# Patient Record
Sex: FEMALE | Race: WHITE | Employment: OTHER | URBAN - METROPOLITAN AREA
[De-identification: names, ages, dates, MRNs, and addresses within clinical notes are randomized per-mention and may not be internally consistent; named-entity substitution may affect disease eponyms.]

---

## 2017-10-30 ENCOUNTER — HOSPITAL ENCOUNTER (EMERGENCY)
Age: 42
Discharge: HOME | End: 2017-10-30
Attending: SPECIALIST
Payer: MEDICARE

## 2017-10-30 ENCOUNTER — APPOINTMENT (OUTPATIENT)
Dept: GENERAL RADIOLOGY | Age: 42
End: 2017-10-30
Payer: MEDICARE

## 2017-10-30 VITALS
BODY MASS INDEX: 39.56 KG/M2 | WEIGHT: 215 LBS | RESPIRATION RATE: 14 BRPM | HEART RATE: 64 BPM | HEIGHT: 62 IN | SYSTOLIC BLOOD PRESSURE: 122 MMHG | DIASTOLIC BLOOD PRESSURE: 72 MMHG | OXYGEN SATURATION: 97 % | TEMPERATURE: 97.7 F

## 2017-10-30 DIAGNOSIS — R11.0 NAUSEA: ICD-10-CM

## 2017-10-30 DIAGNOSIS — R51.9 NONINTRACTABLE HEADACHE, UNSPECIFIED CHRONICITY PATTERN, UNSPECIFIED HEADACHE TYPE: Primary | ICD-10-CM

## 2017-10-30 PROCEDURE — 71020 XR CHEST STANDARD TWO VW: CPT

## 2017-10-30 PROCEDURE — 99283 EMERGENCY DEPT VISIT LOW MDM: CPT

## 2017-10-30 PROCEDURE — 96375 TX/PRO/DX INJ NEW DRUG ADDON: CPT

## 2017-10-30 PROCEDURE — 6360000002 HC RX W HCPCS: Performed by: PHYSICIAN ASSISTANT

## 2017-10-30 PROCEDURE — 96374 THER/PROPH/DIAG INJ IV PUSH: CPT

## 2017-10-30 PROCEDURE — 2580000003 HC RX 258: Performed by: PHYSICIAN ASSISTANT

## 2017-10-30 RX ORDER — DICLOFENAC POTASSIUM 50 MG/1
50 TABLET, FILM COATED ORAL 2 TIMES DAILY
COMMUNITY
End: 2017-12-14

## 2017-10-30 RX ORDER — TIZANIDINE 4 MG/1
4 TABLET ORAL EVERY 6 HOURS PRN
COMMUNITY
End: 2018-11-28 | Stop reason: ALTCHOICE

## 2017-10-30 RX ORDER — DEXAMETHASONE SODIUM PHOSPHATE 10 MG/ML
10 INJECTION INTRAMUSCULAR; INTRAVENOUS ONCE
Status: COMPLETED | OUTPATIENT
Start: 2017-10-30 | End: 2017-10-30

## 2017-10-30 RX ORDER — KETOROLAC TROMETHAMINE 15 MG/ML
15 INJECTION, SOLUTION INTRAMUSCULAR; INTRAVENOUS ONCE
Status: COMPLETED | OUTPATIENT
Start: 2017-10-30 | End: 2017-10-30

## 2017-10-30 RX ORDER — GABAPENTIN 300 MG/1
300 CAPSULE ORAL 3 TIMES DAILY
COMMUNITY
End: 2018-04-26 | Stop reason: ALTCHOICE

## 2017-10-30 RX ORDER — DIPHENHYDRAMINE HYDROCHLORIDE 50 MG/ML
25 INJECTION INTRAMUSCULAR; INTRAVENOUS ONCE
Status: COMPLETED | OUTPATIENT
Start: 2017-10-30 | End: 2017-10-30

## 2017-10-30 RX ORDER — LITHIUM CARBONATE 300 MG
300 TABLET ORAL 3 TIMES DAILY
COMMUNITY
End: 2017-12-14

## 2017-10-30 RX ORDER — BUTALBITAL, ACETAMINOPHEN AND CAFFEINE 50; 325; 40 MG/1; MG/1; MG/1
1 TABLET ORAL EVERY 4 HOURS PRN
COMMUNITY
End: 2017-12-14

## 2017-10-30 RX ORDER — PRIMIDONE 50 MG/1
50 TABLET ORAL 3 TIMES DAILY
COMMUNITY
End: 2017-12-14

## 2017-10-30 RX ORDER — VERAPAMIL HYDROCHLORIDE 240 MG/1
240 CAPSULE, EXTENDED RELEASE ORAL NIGHTLY
COMMUNITY
End: 2017-12-14

## 2017-10-30 RX ORDER — PROMETHAZINE HYDROCHLORIDE 25 MG/1
25 TABLET ORAL 2 TIMES DAILY PRN
COMMUNITY
End: 2018-03-22 | Stop reason: SDUPTHER

## 2017-10-30 RX ORDER — 0.9 % SODIUM CHLORIDE 0.9 %
500 INTRAVENOUS SOLUTION INTRAVENOUS ONCE
Status: COMPLETED | OUTPATIENT
Start: 2017-10-30 | End: 2017-10-30

## 2017-10-30 RX ORDER — PROMETHAZINE HYDROCHLORIDE 25 MG/ML
12.5 INJECTION, SOLUTION INTRAMUSCULAR; INTRAVENOUS ONCE
Status: COMPLETED | OUTPATIENT
Start: 2017-10-30 | End: 2017-10-30

## 2017-10-30 RX ORDER — DULOXETIN HYDROCHLORIDE 60 MG/1
90 CAPSULE, DELAYED RELEASE ORAL DAILY
COMMUNITY
End: 2017-12-14

## 2017-10-30 RX ORDER — DOCUSATE SODIUM 100 MG/1
100 CAPSULE, LIQUID FILLED ORAL 2 TIMES DAILY
COMMUNITY
End: 2017-12-14

## 2017-10-30 RX ORDER — METOCLOPRAMIDE 10 MG/1
10 TABLET ORAL 4 TIMES DAILY
Qty: 20 TABLET | Refills: 0 | Status: SHIPPED | OUTPATIENT
Start: 2017-10-30 | End: 2017-12-14

## 2017-10-30 RX ORDER — DIHYDROERGOTAMINE MESYLATE 1 MG/ML
1 INJECTION, SOLUTION INTRAMUSCULAR; INTRAVENOUS; SUBCUTANEOUS ONCE
COMMUNITY
End: 2017-12-14

## 2017-10-30 RX ADMIN — DIPHENHYDRAMINE HYDROCHLORIDE 25 MG: 50 INJECTION, SOLUTION INTRAMUSCULAR; INTRAVENOUS at 13:52

## 2017-10-30 RX ADMIN — SODIUM CHLORIDE 500 ML: 9 INJECTION, SOLUTION INTRAVENOUS at 13:51

## 2017-10-30 RX ADMIN — PROMETHAZINE HYDROCHLORIDE 12.5 MG: 25 INJECTION INTRAMUSCULAR; INTRAVENOUS at 13:52

## 2017-10-30 RX ADMIN — KETOROLAC TROMETHAMINE 15 MG: 15 INJECTION, SOLUTION INTRAMUSCULAR; INTRAVENOUS at 14:03

## 2017-10-30 RX ADMIN — DEXAMETHASONE SODIUM PHOSPHATE 10 MG: 10 INJECTION INTRAMUSCULAR; INTRAVENOUS at 13:52

## 2017-10-30 ASSESSMENT — PAIN DESCRIPTION - LOCATION
LOCATION: HEAD
LOCATION: HEAD

## 2017-10-30 ASSESSMENT — PAIN SCALES - GENERAL
PAINLEVEL_OUTOF10: 9
PAINLEVEL_OUTOF10: 9
PAINLEVEL_OUTOF10: 7

## 2017-10-30 NOTE — ED PROVIDER NOTES
[facility] ED  [address] [address]  Phone: [phone]  Fax: [phone]      eMERGENCY dEPARTMENT eNCOUnter      Pt Name: Pamela Delatorre  MRN: [id number]  [place] 1975  Date of evaluation: 10/30/17      CHIEF COMPLAINT:   Chief Complaint   Patient presents with    Migraine     since last monday, was seen at [facility] ED last Monday, saw PCP and received phenergan, benadryl, toradol Friday without relief. pt alert et oriented x 4, c/o nausea as well    Nausea     HISTORY OF PRESENT ILLNESS    Alhaji Chang is a 38 y.o. female who presents with a CEPHALGIA complaint:     \"Worst headache of your life? \"     NO  Typical in location/character to previous headaches? YES  Gradually worsening? NO   Nausea? YES    Vomiting? YES    Photophobia? YES  Vision changes? NO  Significant URI/sinusitis? NO  Neck pain? NO  Fevers? NO  Timeframe/context?    7 days  Modifying factors? Pt reports persistent right frontal HA since fall on 10/23/17. She reports being evaluated at [facility] with neg CT Head and discharged home. She was seen at [facility] ED 2 days later for HA and treated/discharged. This HA is consistent with her chronic HA in location/character just more severe. She reports persistent HA and nausea with intermittent vomiting. Vomiting x 6 since yesterday with decreased oral rehydration. She denies any recent viral/URI/ENT/cough/myalgia symptoms. She takes Phenergan and HA meds at home but not helping. No new neck pain/chest pain/abd pain/f/c. She reports intermittent SOB prior to this x 2 weeks. No resp hx, denies smoking or need for MDI. She has Neurologist that she sees but hasn't contacted them, though she reports appt with him this Friday. She also sees Pain Mgmt for her chronic neck pain only. Nursing Notes were reviewed. REVIEW OF SYSTEMS     Constitutional: per HPI  Eyes: per HPI   Neck: No neck pain.    Respiratory: Denies recent shortness of breath. Cardiac:  Denies recent chest pain. GI:  Per HPI  : Denies dysuria. Musculoskeletal: per HPI  Neurologic:  Per HPI  Skin:  Denies any rash. Negative in 10 essential Systems except as mentioned above and in the HPI. PAST MEDICAL HISTORY   PMH:  has a past medical history of Anxiety; Bipolar 1 disorder ([address] CMT (Charcot-Melissa-Tooth disease); Depression; and Psychiatric problem. Surgical History:  has a past surgical history that includes Bladder surgery; Neck surgery (8/2012); knee surgery (Bilateral); Hysterectomy; Cholecystectomy; and Knee arthroscopy. Social History:  reports that she has never smoked. She has never used smokeless tobacco. She reports that she does not drink alcohol or use drugs. Family History: Noncontributory   Psychiatric History: Noncontributory     Allergies:is allergic to latex; azithromycin; buspar [buspirone]; compazine [prochlorperazine maleate]; pcn [penicillins]; and zofran [ondansetron hcl]. PHYSICAL EXAM     INITIAL VITALS: /72   Pulse 64   Temp 97.7 °F (36.5 °C) (Oral)   Resp 14   Ht 5' 2\" (1.575 m)   Wt 97.5 kg (215 lb)   SpO2 97%   BMI 39.32 kg/m²     Constitutional:  Well developed, A&O x 3  Eyes:  Pupils equal and readily reactive to light at 4 mm bilaterally. EOMI. Very mild photophobia only. HENT:  Atraumatic,no TTP. External ears normal, bilat TM wnl.  nose normal, oropharynx decreased hydration. No tenderness to palpation of the temporal arteries. Neck:  No midline pain. Full range of motion. Supple with no meningismus. Respiratory:  Clear to auscultation bilaterally with good air exchange, no W/R/R. Comfortable speech and breathing. Cardiovascular:  RRR with normal S1 and S2  GI:  Soft, nondistended/normal BS, and nontender   Musculoskeletal:  No edema, no tenderness, no deformities  Neuro examination:   No deficits on exam.  Functional ROM BUE/BLE.          EMERGENCY DEPARTMENT COURSE: Orders Placed This Encounter   Medications    0.9 % sodium chloride bolus    promethazine (PHENERGAN) injection 12.5 mg    dexamethasone (DECADRON) injection 10 mg    ketorolac (TORADOL) injection 15 mg    diphenhydrAMINE (BENADRYL) injection 25 mg    metoclopramide (REGLAN) 10 MG tablet     Sig: Take 1 tablet by mouth 4 times daily WARNING:  May cause drowsiness. May impair ability to operate vehicles or machinery. Do not use in combination with alcohol. Dispense:  20 tablet     Refill:  0     1412  AVSS, nontoxic/appropriate patient. IVF/Toradol/Decadron/Phenergan. Controlled Substances Monitoring:     Attestation: The Prescription Monitoring Report for this patient was reviewed today. (Jay Cotton PA-C)  3 Norco rx in the last 30 days. 1459 June 2017 MRI and July 2017 CT Head all negative via [facility]. [phone]  Attending discharged this patient after discussing all labwork/imaging results that were finalized. Treatment plan and recommended follow-up discussed with them as well. I will provide Reglan for alternative anti-emetic as needed. DIAGNOSTIC RESULTS     EKG: All EKG's are interpreted by the Emergency Department Physician who either signs or Co-signs this chart in the absence of a cardiologist.  Not indicated    RADIOLOGY:   Non-plain film images such as CT, Ultrasound and MRI are read by the radiologist. Plain radiographic images are visualized and preliminarily interpreted by the emergency physician with the below findings:    Not indicated    Interpretation per the Radiologist below, if available at the time of this note:    XR CHEST STANDARD (2 VW)   Final Result   No acute cardiopulmonary process identified. LABS:  Labs Reviewed - No data to display  Not indicated    All other labs were within normal range or not returned as of this dictation. FINAL IMPRESSION:     1. Nonintractable headache, unspecified chronicity pattern, unspecified headache type    2.

## 2017-10-31 NOTE — ED PROVIDER NOTES
Emergency Department     Faculty Attestation    I performed a history and physical examination of the patient and discussed management with the mid level provider. I reviewed the mid level provider's note and agree with the documented findings and plan of care. Any areas of disagreement are noted on the chart. I was personally present for the key portions of any procedures. I have documented in the chart those procedures where I was not present during the key portions. I have reviewed the emergency nurses triage note. I agree with the chief complaint, past medical history, past surgical history, allergies, medications, social and family history as documented unless otherwise noted below. Documentation of the HPI, Physical Exam and Medical Decision Making performed by medical students or scribes is based on my personal performance of the HPI, PE and MDM. For Physician Assistant/ Nurse Practitioner cases/documentation I have personally evaluated this patient and have completed at least one if not all key elements of the E/M (history, physical exam, and MDM). Additional findings are as noted. Primary Care Physician:  Brain High DO    CHIEF COMPLAINT       Chief Complaint   Patient presents with    Migraine     since last monday, was seen at [facility] [place] [facility] ED last Monday, saw PCP and received phenergan, benadryl, toradol Friday without relief. pt alert et oriented x 4, c/o nausea as well    Nausea       RECENT VITALS:   Temp: 97.7 °F (36.5 °C),  Pulse: 64, Resp: 14, BP: 122/72    LABS:  Labs Reviewed - No data to display      PERTINENT ATTENDING PHYSICIAN COMMENTS:      Patient has chronic HA's and this one is a typical HA for the patient. Gradual in onset and not \"the worst HA of life\". Denies fevers/chills/IVDA. Physical exam:  CTA Bi, RRR, abd soft/nt/nd/+ BS.    Neuro: CN II-XII intact, 5/5 strength bilaterally, sensation to light touch intact bilaterally, Down going babinski's bilaterally, DTR's are 2+ bilaterally and equal.  Absolutely no meningismus and is nontoxic in appearance. No tenderness to palpation of the temporal arteries, with strong temporal artery pulses palpated. PERRL bilaterally. Plan:  Pain control and reeval after.           Arpita Torres MD  10/31/17 2573

## 2017-11-03 ENCOUNTER — HOSPITAL ENCOUNTER (EMERGENCY)
Age: 42
Discharge: HOME | End: 2017-11-03
Attending: EMERGENCY MEDICINE
Payer: MEDICARE

## 2017-11-03 ENCOUNTER — APPOINTMENT (OUTPATIENT)
Dept: CT IMAGING | Age: 42
End: 2017-11-03
Payer: MEDICARE

## 2017-11-03 VITALS
RESPIRATION RATE: 16 BRPM | HEIGHT: 62 IN | OXYGEN SATURATION: 97 % | WEIGHT: 215 LBS | DIASTOLIC BLOOD PRESSURE: 84 MMHG | TEMPERATURE: 98.2 F | BODY MASS INDEX: 39.56 KG/M2 | HEART RATE: 73 BPM | SYSTOLIC BLOOD PRESSURE: 139 MMHG

## 2017-11-03 DIAGNOSIS — K85.00 IDIOPATHIC ACUTE PANCREATITIS WITHOUT INFECTION OR NECROSIS: Primary | ICD-10-CM

## 2017-11-03 DIAGNOSIS — R51.9 HEADACHE DISORDER: ICD-10-CM

## 2017-11-03 LAB
ABSOLUTE EOS #: 0.8 K/UL (ref 0–0.4)
ABSOLUTE IMMATURE GRANULOCYTE: ABNORMAL K/UL (ref 0–0.3)
ABSOLUTE LYMPH #: 1.4 K/UL (ref 1–4.8)
ABSOLUTE MONO #: 0.7 K/UL (ref 0.1–1.2)
AMYLASE: 124 U/L (ref 28–100)
ANION GAP SERPL CALCULATED.3IONS-SCNC: 14 MMOL/L (ref 9–17)
BASOPHILS # BLD: 1 %
BASOPHILS ABSOLUTE: 0.1 K/UL (ref 0–0.2)
BILIRUBIN URINE: NEGATIVE
BUN BLDV-MCNC: 8 MG/DL (ref 6–20)
BUN/CREAT BLD: NORMAL (ref 9–20)
CALCIUM SERPL-MCNC: 8.7 MG/DL (ref 8.6–10.4)
CHLORIDE BLD-SCNC: 104 MMOL/L (ref 98–107)
CO2: 23 MMOL/L (ref 20–31)
COLOR: YELLOW
COMMENT UA: NORMAL
CREAT SERPL-MCNC: 0.59 MG/DL (ref 0.5–0.9)
DIFFERENTIAL TYPE: ABNORMAL
EOSINOPHILS RELATIVE PERCENT: 7 %
GFR AFRICAN AMERICAN: >60 ML/MIN
GFR NON-AFRICAN AMERICAN: >60 ML/MIN
GFR SERPL CREATININE-BSD FRML MDRD: NORMAL ML/MIN/{1.73_M2}
GFR SERPL CREATININE-BSD FRML MDRD: NORMAL ML/MIN/{1.73_M2}
GLUCOSE BLD-MCNC: 96 MG/DL (ref 70–99)
GLUCOSE URINE: NEGATIVE
HCT VFR BLD CALC: 36.4 % (ref 36–46)
HEMOGLOBIN: 12.1 G/DL (ref 12–16)
IMMATURE GRANULOCYTES: ABNORMAL %
KETONES, URINE: NEGATIVE
LEUKOCYTE ESTERASE, URINE: NEGATIVE
LIPASE: 65 U/L (ref 13–60)
LYMPHOCYTES # BLD: 13 %
MCH RBC QN AUTO: 29.8 PG (ref 26–34)
MCHC RBC AUTO-ENTMCNC: 33.3 G/DL (ref 31–37)
MCV RBC AUTO: 89.5 FL (ref 80–100)
MONOCYTES # BLD: 7 %
NITRITE, URINE: NEGATIVE
PDW BLD-RTO: 14 % (ref 12.5–15.4)
PH UA: 7.5 (ref 5–8)
PLATELET # BLD: 386 K/UL (ref 140–450)
PLATELET ESTIMATE: ABNORMAL
PMV BLD AUTO: 7.7 FL (ref 6–12)
POTASSIUM SERPL-SCNC: 4.6 MMOL/L (ref 3.7–5.3)
PROTEIN UA: NEGATIVE
RBC # BLD: 4.07 M/UL (ref 4–5.2)
RBC # BLD: ABNORMAL 10*6/UL
SEG NEUTROPHILS: 72 %
SEGMENTED NEUTROPHILS ABSOLUTE COUNT: 7.9 K/UL (ref 1.8–7.7)
SODIUM BLD-SCNC: 141 MMOL/L (ref 135–144)
SPECIFIC GRAVITY UA: 1.01 (ref 1–1.03)
TURBIDITY: CLEAR
URINE HGB: NEGATIVE
UROBILINOGEN, URINE: NORMAL
WBC # BLD: 10.8 K/UL (ref 3.5–11)
WBC # BLD: ABNORMAL 10*3/UL

## 2017-11-03 PROCEDURE — 96376 TX/PRO/DX INJ SAME DRUG ADON: CPT

## 2017-11-03 PROCEDURE — 82150 ASSAY OF AMYLASE: CPT

## 2017-11-03 PROCEDURE — 80048 BASIC METABOLIC PNL TOTAL CA: CPT

## 2017-11-03 PROCEDURE — 6360000002 HC RX W HCPCS: Performed by: EMERGENCY MEDICINE

## 2017-11-03 PROCEDURE — 96365 THER/PROPH/DIAG IV INF INIT: CPT

## 2017-11-03 PROCEDURE — 2580000003 HC RX 258: Performed by: EMERGENCY MEDICINE

## 2017-11-03 PROCEDURE — 99284 EMERGENCY DEPT VISIT MOD MDM: CPT

## 2017-11-03 PROCEDURE — 96375 TX/PRO/DX INJ NEW DRUG ADDON: CPT

## 2017-11-03 PROCEDURE — 96366 THER/PROPH/DIAG IV INF ADDON: CPT

## 2017-11-03 PROCEDURE — 36415 COLL VENOUS BLD VENIPUNCTURE: CPT

## 2017-11-03 PROCEDURE — 70450 CT HEAD/BRAIN W/O DYE: CPT

## 2017-11-03 PROCEDURE — 83690 ASSAY OF LIPASE: CPT

## 2017-11-03 PROCEDURE — 85025 COMPLETE CBC W/AUTO DIFF WBC: CPT

## 2017-11-03 PROCEDURE — 72125 CT NECK SPINE W/O DYE: CPT

## 2017-11-03 RX ORDER — PROMETHAZINE HYDROCHLORIDE 25 MG/ML
12.5 INJECTION, SOLUTION INTRAMUSCULAR; INTRAVENOUS ONCE
Status: COMPLETED | OUTPATIENT
Start: 2017-11-03 | End: 2017-11-03

## 2017-11-03 RX ORDER — DIPHENHYDRAMINE HYDROCHLORIDE 50 MG/ML
25 INJECTION INTRAMUSCULAR; INTRAVENOUS ONCE
Status: COMPLETED | OUTPATIENT
Start: 2017-11-03 | End: 2017-11-03

## 2017-11-03 RX ORDER — ESOMEPRAZOLE MAGNESIUM 40 MG/1
40 CAPSULE, DELAYED RELEASE ORAL
Qty: 30 CAPSULE | Refills: 0 | Status: SHIPPED | OUTPATIENT
Start: 2017-11-03 | End: 2017-12-14

## 2017-11-03 RX ORDER — KETOROLAC TROMETHAMINE 15 MG/ML
15 INJECTION, SOLUTION INTRAMUSCULAR; INTRAVENOUS ONCE
Status: COMPLETED | OUTPATIENT
Start: 2017-11-03 | End: 2017-11-03

## 2017-11-03 RX ORDER — 0.9 % SODIUM CHLORIDE 0.9 %
1000 INTRAVENOUS SOLUTION INTRAVENOUS ONCE
Status: COMPLETED | OUTPATIENT
Start: 2017-11-03 | End: 2017-11-03

## 2017-11-03 RX ADMIN — DIPHENHYDRAMINE HYDROCHLORIDE 25 MG: 50 INJECTION, SOLUTION INTRAMUSCULAR; INTRAVENOUS at 13:13

## 2017-11-03 RX ADMIN — SODIUM CHLORIDE 1000 ML: 9 INJECTION, SOLUTION INTRAVENOUS at 11:27

## 2017-11-03 RX ADMIN — KETOROLAC TROMETHAMINE 15 MG: 15 INJECTION, SOLUTION INTRAMUSCULAR; INTRAVENOUS at 11:31

## 2017-11-03 RX ADMIN — PROMETHAZINE HYDROCHLORIDE 12.5 MG: 25 INJECTION INTRAMUSCULAR; INTRAVENOUS at 11:30

## 2017-11-03 RX ADMIN — PROMETHAZINE HYDROCHLORIDE 12.5 MG: 25 INJECTION INTRAMUSCULAR; INTRAVENOUS at 13:12

## 2017-11-03 RX ADMIN — MAGNESIUM SULFATE 2 G: 500 INJECTION, SOLUTION INTRAMUSCULAR; INTRAVENOUS at 11:36

## 2017-11-03 RX ADMIN — DIPHENHYDRAMINE HYDROCHLORIDE 25 MG: 50 INJECTION, SOLUTION INTRAMUSCULAR; INTRAVENOUS at 11:29

## 2017-11-03 ASSESSMENT — PAIN DESCRIPTION - LOCATION: LOCATION: HEAD

## 2017-11-03 ASSESSMENT — PAIN SCALES - GENERAL
PAINLEVEL_OUTOF10: 9

## 2017-11-03 ASSESSMENT — PAIN DESCRIPTION - PAIN TYPE: TYPE: ACUTE PAIN

## 2017-11-03 ASSESSMENT — ENCOUNTER SYMPTOMS
NAUSEA: 1
PHOTOPHOBIA: 1
ABDOMINAL PAIN: 1

## 2017-11-03 NOTE — ED NOTES
Pt rates pain at an 8 on 0-10 scale. Denies needs - will continue to monitor.       Scott Holt RN  11/03/17 4892

## 2017-11-03 NOTE — ED NOTES
Pt requests to urinate prior to initiation of iv fluids - given instruction for obtaining clean catch urine. Pt verbalized understanding. Specimen obtained et sent to lab.         Johnathan Mercer RN  11/03/17 1881

## 2017-11-03 NOTE — ED NOTES
Celi Chatman MD at bedside.  Pt et family updated on results, treatment plan     Keli Lara RN  11/03/17 2069

## 2017-11-03 NOTE — ED NOTES
Pt presents to ed with c/o migraine et abd pain. Reports that she fell a couple weeks ago and hit her head, pt has been seen at multiple ED's since the fall with c/o headaches. Pt reports that she has also had some vomiting and abd pain that began today. Pt states that she sees a neurologist for migraines, but that she has not notified him of this episode. Pt reports she took some of her migraine medications yesterday but nothing today. Upon arrival pt is brought to room 9 via wheelchair.   She is able to tx to the bed per self without assist      Tracy Stahl RN  11/03/17 3886

## 2017-11-03 NOTE — ED PROVIDER NOTES
[facility] ED  [address] [place] [address]  Phone: [phone]  Fax: [phone]        Pt Name: Pamela Delatorre  MRN: [id number]  [place] 1975  Date of evaluation: 11/3/17      CHIEF COMPLAINT       Chief Complaint   Patient presents with    Migraine     x2 days    Abdominal Pain     x1 day         HISTORY OF PRESENT ILLNESS  (Location/Symptom, Timing/Onset, Context/Setting, Quality, Duration, Modifying Factors, Severity.)    Pamela Delatorre is a 38 y.o. female who presents With a headache. The patient states that several days ago she fell hitting her head, causing a brief loss of consciousness, was seen at a [facility] emergency department where the patient had a CT scan of the head reported as no abnormality. The patient states that since then she has had a headache. It is pounding throbbing causing her to be photosensitive, sound sensitive and nauseated, which is typical for her migraines. The patient states that she has a history of migraines. The patient further states that she has some epigastric abdominal pain that started yesterday. No vomiting, no fever or chills. The patient further relates that she has had some neck pain since the fall and has a history of chronic neck pain       REVIEW OF SYSTEMS    (2-9 systems for level 4, 10 or more for level 5)     Review of Systems   Eyes: Positive for photophobia. Gastrointestinal: Positive for abdominal pain and nausea. Musculoskeletal: Positive for neck pain. Neurological: Positive for headaches. All other systems reviewed and are negative. 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 SURGICAL HISTORY      has a past surgical history that includes Bladder surgery; Neck surgery (8/2012); knee surgery (Bilateral); Hysterectomy; Cholecystectomy; and Knee arthroscopy.     CURRENT MEDICATIONS       Previous Medications    BISACODYL (DULCOLAX) 5 MG EC TABLET    Take 5 mg by mouth daily as needed for Constipation    BUTALBITAL-ACETAMINOPHEN-CAFFEINE (FIORICET, ESGIC) -40 MG PER TABLET    Take 1 tablet by mouth every 4 hours as needed for Headaches    CLONAZEPAM (KLONOPIN) 0.5 MG TABLET    Take by mouth 2 times daily as needed     DICLOFENAC (CATAFLAM) 50 MG TABLET    Take 50 mg by mouth 2 times daily    DIHYDROERGOTAMINE (DHE) 1 MG/ML INJECTION    Infuse 1 mg intravenously once    DOCUSATE SODIUM (COLACE) 100 MG CAPSULE    Take 100 mg by mouth 2 times daily    DULOXETINE (CYMBALTA) 60 MG EXTENDED RELEASE CAPSULE    Take 90 mg by mouth daily    GABAPENTIN (NEURONTIN) 300 MG CAPSULE    Take 300 mg by mouth 3 times daily    LITHIUM 300 MG TABLET    Take 300 mg by mouth 3 times daily    METOCLOPRAMIDE (REGLAN) 10 MG TABLET    Take 1 tablet by mouth 4 times daily WARNING:  May cause drowsiness. May impair ability to operate vehicles or machinery. Do not use in combination with alcohol. NONFORMULARY        PRIMIDONE (MYSOLINE) 50 MG TABLET    Take 50 mg by mouth 3 times daily    PROMETHAZINE (PHENERGAN) 25 MG TABLET    Take 25 mg by mouth 2 times daily as needed for Nausea    TIZANIDINE (ZANAFLEX) 4 MG TABLET    Take 4 mg by mouth every 6 hours as needed    TRAZODONE (DESYREL) 100 MG TABLET    Take 300 mg by mouth nightly     VERAPAMIL (VERELAN) 240 MG EXTENDED RELEASE CAPSULE    Take 240 mg by mouth nightly       ALLERGIES     is allergic to latex; azithromycin; buspar [buspirone]; compazine [prochlorperazine maleate]; pcn [penicillins]; and zofran [ondansetron hcl]. FAMILY HISTORY     has no family status information on file. family history is not on file. SOCIAL HISTORY      reports that she has never smoked. She has never used smokeless tobacco. She reports that she does not drink alcohol or use drugs.     PHYSICAL EXAM    (up to 7 for level 4, 8 or more for level 5) INITIAL VITALS:  height is 5' 2\" (1.575 m) and weight is 97.5 kg (215 lb). Her oral temperature is 98.2 °F (36.8 °C). Her blood pressure is 139/84 and her pulse is 73. Her respiration is 16 and oxygen saturation is 97%. Physical Exam   Constitutional: She appears well-developed and well-nourished. HENT:   Head: Normocephalic and atraumatic. Eyes: Conjunctivae are normal.   Neck:   Posterior neck pain, no meningeal signs   Cardiovascular: Normal rate, regular rhythm and normal heart sounds. Pulmonary/Chest: Effort normal and breath sounds normal. No respiratory distress. She has no wheezes. She has no rales. Abdominal: Soft. Bowel sounds are normal. She exhibits no distension and no mass. There is tenderness. There is no rebound and no guarding. Mild tenderness in the epigastric region only   Musculoskeletal: Normal range of motion. She exhibits no tenderness. Neurological: She is alert. GCS of 15 with no focal deficits appreciated   Skin: Skin is warm and dry. No rash noted. Psychiatric: She has a normal mood and affect. Nursing note and vitals reviewed. DIFFERENTIAL DIAGNOSIS/ MDM:     I will check a CT head and neck because of the fall and basic labs with the abdominal pain. DIAGNOSTIC RESULTS         RADIOLOGY:   Non-plain film images such as CT, Ultrasound and MRI are read by the radiologist. Jettie Carwin radiographic images are visualized and the radiologist interpretations are reviewed as follows:         Interpretation per the Radiologist below, if available at the time of this note:    CT Head WO Contrast (Final result)   Result time 11/03/17 13:00:32   Final result by Anna Marie Malloy MD (11/03/17 13:00:32)                Impression:    2 adjacent small areas of hypoattenuation in the anterior right frontal lobe  possibly related to remote lacunar infarcts but nonspecific.  Otherwise  negative CT brain with no acute intracranial abnormality.     RECOMMENDATIONS:  Recommend obtaining 4.8 k/uL    Absolute Mono # 0.70 0.1 - 1.2 k/uL    Absolute Eos # 0.80 (H) 0.0 - 0.4 k/uL    Basophils # 0.10 0.0 - 0.2 k/uL    Absolute Immature Granulocyte NOT REPORTED 0.00 - 0.30 k/uL    WBC Morphology NOT REPORTED     RBC Morphology NOT REPORTED     Platelet Estimate NOT REPORTED    UA W/REFLEX CULTURE   Result Value Ref Range    Color, UA YELLOW YEL    Turbidity UA CLEAR CLEAR    Glucose, Ur NEGATIVE NEG    Bilirubin Urine NEGATIVE NEG    Ketones, Urine NEGATIVE NEG    Specific Gravity, UA 1.015 1.005 - 1.030    Urine Hgb NEGATIVE NEG    pH, UA 7.5 5.0 - 8.0    Protein, UA NEGATIVE NEG    Urobilinogen, Urine Normal NORM    Nitrite, Urine NEGATIVE NEG    Leukocyte Esterase, Urine NEGATIVE NEG    Urinalysis Comments       Microscopic exam not performed based on chemical results unless requested in   Amylase   Result Value Ref Range    Amylase 124 (H) 28 - 100 U/L   Basic Metabolic Panel   Result Value Ref Range    Glucose 96 70 - 99 mg/dL    BUN 8 6 - 20 mg/dL    CREATININE 0.59 0.50 - 0.90 mg/dL    Bun/Cre Ratio NOT REPORTED 9 - 20    Calcium 8.7 8.6 - 10.4 mg/dL    Sodium 141 135 - 144 mmol/L    Potassium 4.6 3.7 - 5.3 mmol/L    Chloride 104 98 - 107 mmol/L    CO2 23 20 - 31 mmol/L    Anion Gap 14 9 - 17 mmol/L    GFR Non-African American >60 >60 mL/min    GFR African American >60 >60 mL/min    GFR Comment          GFR Staging NOT REPORTED    Lipase   Result Value Ref Range    Lipase 65 (H) 13 - 60 U/L       Labs show the patient has a slight increase in the amylase and lipase consistent with a possible beginning pancreatitis    EMERGENCY DEPARTMENT COURSE:   Vitals:    Vitals:    11/03/17 1001 11/03/17 1312   BP: (!) 157/85 139/84   Pulse: 93 73   Resp: 18 16   Temp: 98.2 °F (36.8 °C)    TempSrc: Oral    SpO2: 95% 97%   Weight: 97.5 kg (215 lb)    Height: 5' 2\" (1.575 m)      -------------------------  BP: 139/84, Temp: 98.2 °F (36.8 °C), Pulse: 73, Resp: 16      RE-EVALUATION:  On repeat evaluation of the patient. The patient is feeling better. She has had her headache for the last couple of weeks. I did discuss this CT finding with the patient as well as the labs at this point with the patient feeling better. She is going to work this up as an outpatient. Recommending she return to the ER immediately for increasing headache or increasing abdominal pain, vomiting numbness weakness visual hearing changes, fever or other concerns. Otherwise, she is to call her family doctor and her neurologist today to arrange a follow-up appointment  At this time the patient is without objective evidence of an acute process requiring hospitalization or inpatient management. They have remained hemodynamically stable throughout their entire ED visit and are stable for discharge with outpatient follow-up. The plan has been discussed in detail and they are aware of the specific conditions for emergent return, as well as the importance of follow-up    I have reviewed the disposition diagnosis with the patient and or their family/guardian. I have answered their questions and given discharge instructions. They voiced understanding of these instructions and did not have any further questions or complaints. PROCEDURES:  None    FINAL IMPRESSION      1. Idiopathic acute pancreatitis without infection or necrosis    2. Headache disorder          DISPOSITION/PLAN   DISPOSITION     CONDITION ON DISPOSITION:   Improved - Stable    PATIENT REFERRED TO:  DO Donna Lopez [address] [facility]  [phone]    Call today        DISCHARGE MEDICATIONS:  New Prescriptions    ESOMEPRAZOLE (NEXIUM) 40 MG DELAYED RELEASE CAPSULE    Take 1 capsule by mouth every morning (before breakfast)       (Please note that portions of this note were completed with a voice recognition program.  Efforts were made to edit the dictations but occasionally words are mis-transcribed.)    Link MD, F.A.C.E.P.   Attending

## 2017-11-16 ENCOUNTER — HOSPITAL ENCOUNTER (EMERGENCY)
Age: 42
Discharge: HOME | End: 2017-11-16
Attending: EMERGENCY MEDICINE
Payer: MEDICARE

## 2017-11-16 VITALS
RESPIRATION RATE: 18 BRPM | OXYGEN SATURATION: 97 % | HEART RATE: 100 BPM | SYSTOLIC BLOOD PRESSURE: 152 MMHG | DIASTOLIC BLOOD PRESSURE: 96 MMHG | BODY MASS INDEX: 39.56 KG/M2 | WEIGHT: 215 LBS | HEIGHT: 62 IN | TEMPERATURE: 98.4 F

## 2017-11-16 DIAGNOSIS — M79.605 PAIN IN BOTH LOWER EXTREMITIES: Primary | ICD-10-CM

## 2017-11-16 DIAGNOSIS — R11.0 NAUSEA: ICD-10-CM

## 2017-11-16 DIAGNOSIS — M79.604 PAIN IN BOTH LOWER EXTREMITIES: Primary | ICD-10-CM

## 2017-11-16 DIAGNOSIS — R51.9 NONINTRACTABLE EPISODIC HEADACHE, UNSPECIFIED HEADACHE TYPE: ICD-10-CM

## 2017-11-16 LAB
-: ABNORMAL
ABSOLUTE EOS #: 0.4 K/UL (ref 0–0.4)
ABSOLUTE IMMATURE GRANULOCYTE: NORMAL K/UL (ref 0–0.3)
ABSOLUTE LYMPH #: 2.2 K/UL (ref 1–4.8)
ABSOLUTE MONO #: 0.9 K/UL (ref 0.1–1.2)
ALBUMIN SERPL-MCNC: 3.9 G/DL (ref 3.5–5.2)
ALBUMIN/GLOBULIN RATIO: 1.4 (ref 1–2.5)
ALP BLD-CCNC: 78 U/L (ref 35–104)
ALT SERPL-CCNC: 17 U/L (ref 5–33)
AMORPHOUS: ABNORMAL
ANION GAP SERPL CALCULATED.3IONS-SCNC: 18 MMOL/L (ref 9–17)
AST SERPL-CCNC: 13 U/L
BACTERIA: ABNORMAL
BASOPHILS # BLD: 1 %
BASOPHILS ABSOLUTE: 0.1 K/UL (ref 0–0.2)
BILIRUB SERPL-MCNC: 0.36 MG/DL (ref 0.3–1.2)
BILIRUBIN URINE: NEGATIVE
BUN BLDV-MCNC: 7 MG/DL (ref 6–20)
BUN/CREAT BLD: ABNORMAL (ref 9–20)
CALCIUM SERPL-MCNC: 9.6 MG/DL (ref 8.6–10.4)
CASTS UA: ABNORMAL /LPF
CHLORIDE BLD-SCNC: 105 MMOL/L (ref 98–107)
CO2: 18 MMOL/L (ref 20–31)
COLOR: YELLOW
COMMENT UA: ABNORMAL
CREAT SERPL-MCNC: 0.57 MG/DL (ref 0.5–0.9)
CRYSTALS, UA: ABNORMAL /HPF
DIFFERENTIAL TYPE: NORMAL
EOSINOPHILS RELATIVE PERCENT: 4 %
EPITHELIAL CELLS UA: ABNORMAL /HPF (ref 0–5)
GFR AFRICAN AMERICAN: >60 ML/MIN
GFR NON-AFRICAN AMERICAN: >60 ML/MIN
GFR SERPL CREATININE-BSD FRML MDRD: ABNORMAL ML/MIN/{1.73_M2}
GFR SERPL CREATININE-BSD FRML MDRD: ABNORMAL ML/MIN/{1.73_M2}
GLUCOSE BLD-MCNC: 73 MG/DL (ref 70–99)
GLUCOSE URINE: NEGATIVE
HCT VFR BLD CALC: 37.1 % (ref 36–46)
HEMOGLOBIN: 12.5 G/DL (ref 12–16)
IMMATURE GRANULOCYTES: NORMAL %
KETONES, URINE: NEGATIVE
LEUKOCYTE ESTERASE, URINE: NEGATIVE
LIPASE: 29 U/L (ref 13–60)
LYMPHOCYTES # BLD: 22 %
MCH RBC QN AUTO: 29.8 PG (ref 26–34)
MCHC RBC AUTO-ENTMCNC: 33.7 G/DL (ref 31–37)
MCV RBC AUTO: 88.3 FL (ref 80–100)
MONOCYTES # BLD: 9 %
MUCUS: ABNORMAL
NITRITE, URINE: NEGATIVE
OTHER OBSERVATIONS UA: ABNORMAL
PDW BLD-RTO: 14.3 % (ref 12.5–15.4)
PH UA: 5 (ref 5–8)
PLATELET # BLD: 279 K/UL (ref 140–450)
PLATELET ESTIMATE: NORMAL
PMV BLD AUTO: 7.7 FL (ref 6–12)
POTASSIUM SERPL-SCNC: 4 MMOL/L (ref 3.7–5.3)
PROTEIN UA: NEGATIVE
RBC # BLD: 4.2 M/UL (ref 4–5.2)
RBC # BLD: NORMAL 10*6/UL
RBC UA: ABNORMAL /HPF (ref 0–2)
RENAL EPITHELIAL, UA: ABNORMAL /HPF
SEG NEUTROPHILS: 64 %
SEGMENTED NEUTROPHILS ABSOLUTE COUNT: 6.3 K/UL (ref 1.8–7.7)
SODIUM BLD-SCNC: 141 MMOL/L (ref 135–144)
SPECIFIC GRAVITY UA: 1.01 (ref 1–1.03)
TOTAL PROTEIN: 6.6 G/DL (ref 6.4–8.3)
TRICHOMONAS: ABNORMAL
TURBIDITY: ABNORMAL
URINE HGB: ABNORMAL
UROBILINOGEN, URINE: NORMAL
WBC # BLD: 9.9 K/UL (ref 3.5–11)
WBC # BLD: NORMAL 10*3/UL
WBC UA: ABNORMAL /HPF (ref 0–5)
YEAST: ABNORMAL

## 2017-11-16 PROCEDURE — 80053 COMPREHEN METABOLIC PANEL: CPT

## 2017-11-16 PROCEDURE — 96374 THER/PROPH/DIAG INJ IV PUSH: CPT

## 2017-11-16 PROCEDURE — 85025 COMPLETE CBC W/AUTO DIFF WBC: CPT

## 2017-11-16 PROCEDURE — 96375 TX/PRO/DX INJ NEW DRUG ADDON: CPT

## 2017-11-16 PROCEDURE — 36415 COLL VENOUS BLD VENIPUNCTURE: CPT

## 2017-11-16 PROCEDURE — 99284 EMERGENCY DEPT VISIT MOD MDM: CPT

## 2017-11-16 PROCEDURE — 87086 URINE CULTURE/COLONY COUNT: CPT

## 2017-11-16 PROCEDURE — 83690 ASSAY OF LIPASE: CPT

## 2017-11-16 PROCEDURE — 6360000002 HC RX W HCPCS: Performed by: PHYSICIAN ASSISTANT

## 2017-11-16 PROCEDURE — 81001 URINALYSIS AUTO W/SCOPE: CPT

## 2017-11-16 PROCEDURE — 2580000003 HC RX 258: Performed by: PHYSICIAN ASSISTANT

## 2017-11-16 RX ORDER — PROMETHAZINE HYDROCHLORIDE 25 MG/1
25 TABLET ORAL EVERY 6 HOURS PRN
Qty: 10 TABLET | Refills: 0 | Status: SHIPPED | OUTPATIENT
Start: 2017-11-16 | End: 2017-12-14

## 2017-11-16 RX ORDER — 0.9 % SODIUM CHLORIDE 0.9 %
1000 INTRAVENOUS SOLUTION INTRAVENOUS ONCE
Status: COMPLETED | OUTPATIENT
Start: 2017-11-16 | End: 2017-11-16

## 2017-11-16 RX ORDER — PROMETHAZINE HYDROCHLORIDE 25 MG/ML
12.5 INJECTION, SOLUTION INTRAMUSCULAR; INTRAVENOUS ONCE
Status: COMPLETED | OUTPATIENT
Start: 2017-11-16 | End: 2017-11-16

## 2017-11-16 RX ORDER — KETOROLAC TROMETHAMINE 15 MG/ML
15 INJECTION, SOLUTION INTRAMUSCULAR; INTRAVENOUS ONCE
Status: COMPLETED | OUTPATIENT
Start: 2017-11-16 | End: 2017-11-16

## 2017-11-16 RX ADMIN — KETOROLAC TROMETHAMINE 15 MG: 15 INJECTION, SOLUTION INTRAMUSCULAR; INTRAVENOUS at 17:03

## 2017-11-16 RX ADMIN — SODIUM CHLORIDE 1000 ML: 9 INJECTION, SOLUTION INTRAVENOUS at 16:16

## 2017-11-16 RX ADMIN — PROMETHAZINE HYDROCHLORIDE 12.5 MG: 25 INJECTION INTRAMUSCULAR; INTRAVENOUS at 16:27

## 2017-11-16 ASSESSMENT — PAIN SCALES - GENERAL
PAINLEVEL_OUTOF10: 8
PAINLEVEL_OUTOF10: 9
PAINLEVEL_OUTOF10: 9

## 2017-11-16 ASSESSMENT — PAIN DESCRIPTION - LOCATION: LOCATION: LEG;HEAD

## 2017-11-16 NOTE — ED PROVIDER NOTES
Emergency Department     Faculty Attestation    I performed a history and physical examination of the patient and discussed management with the mid level provideer. I reviewed the mid level provider's note and agree with the documented findings and plan of care. Any areas of disagreement are noted on the chart. I was personally present for the key portions of any procedures. I have documented in the chart those procedures where I was not present during the key portions. I have reviewed the emergency nurses triage note. I agree with the chief complaint, past medical history, past surgical history, allergies, medications, social and family history as documented unless otherwise noted below. Documentation of the HPI, Physical Exam and Medical Decision Making performed by medical students or scribes is based on my personal performance of the HPI, PE and MDM. For Physician Assistant/ Nurse Practitioner cases/documentation I have personally evaluated this patient and have completed at least one if not all key elements of the E/M (history, physical exam, and MDM). Additional findings are as noted. CHIEF COMPLAINT       Chief Complaint   Patient presents with    Leg Pain     bilateral; 3 days    Headache      PHYSICAL EXAM      Constitutional: Alert, oriented x3, nontoxic, answering questions appropriately, acting properly for age, in no acute distress   HEENT: Extraocular muscles intact, mucus membranes moist, ,   Neck: Trachea midline,    Cardiovascular: Regular rhythm and rate no S3, S4, or murmurs   Respiratory: Clear to auscultation bilaterally no wheezes, rhonchi, rales, no respiratory distress   Gastrointestinal: Soft, mild epigastric tenderness to palpation, nondistended, positive bowel sounds. No rebound, rigidity, or guarding.    Musculoskeletal: No extremity pain or swelling   Neurologic: Moving all 4 extremities without difficulty there are no gross focal neurologic deficits   Skin: Warm and dry     DIAGNOSTIC RESULTS     EKG: All EKG's are interpreted by the Emergency Department Physician who either signs or Co-signs this chart in the absence of a cardiologist.    Not indicated unless otherwise documented above or in the midlevel documentation    LABS:  Results for orders placed or performed during the hospital encounter of 11/16/17   CBC Auto Differential   Result Value Ref Range    WBC 9.9 3.5 - 11.0 k/uL    RBC 4.20 4.0 - 5.2 m/uL    Hemoglobin 12.5 12.0 - 16.0 g/dL    Hematocrit 37.1 36 - 46 %    MCV 88.3 80 - 100 fL    MCH 29.8 26 - 34 pg    MCHC 33.7 31 - 37 g/dL    RDW 14.3 12.5 - 15.4 %    Platelets 785 094 - 430 k/uL    MPV 7.7 6.0 - 12.0 fL    Differential Type NOT REPORTED     Seg Neutrophils 64 %    Lymphocytes 22 %    Monocytes 9 %    Eosinophils % 4 %    Basophils 1 %    Immature Granulocytes NOT REPORTED 0 %    Segs Absolute 6.30 1.8 - 7.7 k/uL    Absolute Lymph # 2.20 1.0 - 4.8 k/uL    Absolute Mono # 0.90 0.1 - 1.2 k/uL    Absolute Eos # 0.40 0.0 - 0.4 k/uL    Basophils # 0.10 0.0 - 0.2 k/uL    Absolute Immature Granulocyte NOT REPORTED 0.00 - 0.30 k/uL    WBC Morphology NOT REPORTED     RBC Morphology NOT REPORTED     Platelet Estimate NOT REPORTED    Comprehensive Metabolic Panel   Result Value Ref Range    Glucose 73 70 - 99 mg/dL    BUN 7 6 - 20 mg/dL    CREATININE 0.57 0.50 - 0.90 mg/dL    Bun/Cre Ratio NOT REPORTED 9 - 20    Calcium 9.6 8.6 - 10.4 mg/dL    Sodium 141 135 - 144 mmol/L    Potassium 4.0 3.7 - 5.3 mmol/L    Chloride 105 98 - 107 mmol/L    CO2 18 (L) 20 - 31 mmol/L    Anion Gap 18 (H) 9 - 17 mmol/L    Alkaline Phosphatase 78 35 - 104 U/L    ALT 17 5 - 33 U/L    AST 13 <32 U/L    Total Bilirubin 0.36 0.3 - 1.2 mg/dL    Total Protein 6.6 6.4 - 8.3 g/dL    Alb 3.9 3.5 - 5.2 g/dL    Albumin/Globulin Ratio 1.4 1.0 - 2.5    GFR Non-African American >60 >60 mL/min    GFR African American >60 >60 mL/min    GFR Comment          GFR Staging NOT REPORTED    Lipase   Result Value Ref Range    Lipase 29 13 - 60 U/L   UA W/REFLEX CULTURE   Result Value Ref Range    Color, UA YELLOW YEL    Turbidity UA SLIGHTLY CLOUDY (A) CLEAR    Glucose, Ur NEGATIVE NEG    Bilirubin Urine NEGATIVE NEG    Ketones, Urine NEGATIVE NEG    Specific Gravity, UA 1.010 1.005 - 1.030    Urine Hgb TRACE (A) NEG    pH, UA 5.0 5.0 - 8.0    Protein, UA NEGATIVE NEG    Urobilinogen, Urine Normal NORM    Nitrite, Urine NEGATIVE NEG    Leukocyte Esterase, Urine NEGATIVE NEG    Urinalysis Comments NOT REPORTED    Microscopic Urinalysis   Result Value Ref Range    -          WBC, UA 5 TO 10 0 - 5 /HPF    RBC, UA 2 TO 5 0 - 2 /HPF    Casts UA NOT REPORTED /LPF    Crystals UA NOT REPORTED NONE /HPF    Epithelial Cells UA 10 TO 20 0 - 5 /HPF    Renal Epithelial, Urine NOT REPORTED 0 /HPF    Bacteria, UA MODERATE (A) NONE    Mucus, UA NOT REPORTED NONE    Trichomonas, UA NOT REPORTED NONE    Amorphous, UA NOT REPORTED NONE    Other Observations UA Culture ordered based on defined criteria. (A) NREQ    Yeast, UA NOT REPORTED NONE       Not indicated unless otherwise documented above or in the midlevel documentation    RADIOLOGY:   I reviewed the radiologist interpretations:  No orders to display       Not indicated unless otherwise documented above or in the midlevel documentation    EMERGENCY DEPARTMENT COURSE:       PERTINENT ATTENDING PHYSICIAN COMMENTS:    Patient with multiple emergency department visits and workups over the past 2 weeks including a CTA of the head and neck. Multiple visits for headaches at [facility] on the fourth seventh and ninth of this month. She was seen at another emergency department on the second following a head injury and our emergency department on the third. She is now complaining of nausea vomiting abdominal pain headache and multiple falls. She is complaining of leg pain and leg weakness. Benign examination. IV fluids and labs. Patient is supposed to see her neurologist tomorrow.     Labs

## 2017-11-16 NOTE — ED PROVIDER NOTES
[facility]  [address] [address]  Phone: [phone]  Fax: [phone]      eMERGENCY dEPARTMENT eNCOUnter      Pt Name: Lelsy Childs  MRN: [id number]  [place] 1975  Date of evaluation: 11/16/2017  Provider: Chiqui Parker PA-C    CHIEF COMPLAINT       Chief Complaint   Patient presents with    Leg Pain     bilateral; 3 days    Headache           HISTORY OF PRESENT ILLNESS  (Location/Symptom, Timing/Onset, Context/Setting, Quality, Duration, Modifying Factors, Severity.)   Lesly Childs is a 38 y.o. female who presents to the emergency department for evaluation of bilat leg weakness/pain and recent falls at home over the last 2 days. She lives at home with  but is accompanied by her mother-in-law. She reports HA which she feels is \"just a migraine\". She denies any fever/chills/chest/resp/back- symptoms but when I complete my ROS she confirms 2 days of n/v daily both with/without eating and some upper abd pain. She reports have having been told she had mild pancreatitis when she was here a week or so ago. She was at [place] [facility] 2 days ago for her leg pain/falling and they completed full abd labs/CTA-CT Head and Carotid. All studies and labs were negative for acute issues. She has been drinking some she reports but vomiting with eating. Mother-in-law reports she decided to stop taking all her meds 2 weeks ago because she thought they were making her feel worse. She states she doesn't like [facility] and likes our care here. Her PCP is a [facility] doctor. Nursing Notes were reviewed. REVIEW OF SYSTEMS    (2-9 systems for level 4, 10 or more for level 5)     Review of Systems   Constitutional: Negative. HENT: Negative. Eyes: Negative. Respiratory: Negative. Cardiovascular: Negative. Gastrointestinal: Negative. Musculoskeletal: Negative. Endocrine: Negative. Genitourinary: Negative. Skin: Negative.    Allergic/Immunologic: are interpreted by the Emergency Department Physician who either signs or Co-signs this chart in the absence of a cardiologist.    Not indicated    RADIOLOGY:   Non-plain film images such as CT, Ultrasound and MRI are read by the radiologist. Plain radiographic images are visualized and preliminarily interpreted by the emergency physician with the below findings:      Interpretation per the Radiologist below, if available at the time of this note:    No orders to display           LABS:  Labs Reviewed   COMPREHENSIVE METABOLIC PANEL - Abnormal; Notable for the following:        Result Value    CO2 18 (*)     Anion Gap 18 (*)     All other components within normal limits   UA W/REFLEX CULTURE - Abnormal; Notable for the following:     Turbidity UA SLIGHTLY CLOUDY (*)     Urine Hgb TRACE (*)     All other components within normal limits   MICROSCOPIC URINALYSIS - Abnormal; Notable for the following:     Bacteria, UA MODERATE (*)     Other Observations UA Culture ordered based on defined criteria. (*)     All other components within normal limits   URINE CULTURE CLEAN CATCH   CBC WITH AUTO DIFFERENTIAL   LIPASE         All other labs were within normal range or not returned as of this dictation. EMERGENCY DEPARTMENT COURSE and DIFFERENTIAL DIAGNOSIS/MDM:   Vitals:    Vitals:    11/16/17 1506   BP: (!) 141/101   Pulse: 90   Resp: 14   Temp: 98.4 °F (36.9 °C)   TempSrc: Oral   SpO2: 97%   Weight: 97.5 kg (215 lb)   Height: 5' 2\" (1.575 m)       1551  This pt has had extensive ED workups recently, all negative. She was seen at [facility] 2 days ago, [facility] on 11/9 and here on 11/3 and 10/30. She is providing very little information unless prompted by questions. She is nontoxic and has a benign abd exam.  I am getting repeat abd labs to r/o any Pancreatitis and providing IVF. She discussed no significant pain of her legs with me, just weakness.     56  Pt requesting something for nausea now, Phenergan IV

## 2017-11-16 NOTE — ED NOTES
Discharge instructions and paperwork given. Patient denies any questions or concerns.      Mundo Hutchins RN  11/16/17 5867

## 2017-11-16 NOTE — ED NOTES
Patient to room via wheelchair accompanied by her mother in law. Patient states she has not been feeling well for the past few weeks. Patient is complaining of leg pain and weakness. Patient states she has fallen at home while trying to walk. Patient reports the pain is from the hip to her toes and denies any injury to the legs. Patient is complaining of a headache with the pain mostly on the right side of the head. Patient also reports nausea and vomiting caused from the headache. Patient is no acute distress. Respirations are unlabored and vital signs are stable. Call light is within reach.      Duane Leos RN  11/16/17 2856

## 2017-11-17 LAB
CULTURE: NORMAL
CULTURE: NORMAL
Lab: NORMAL
SPECIMEN DESCRIPTION: NORMAL
SPECIMEN DESCRIPTION: NORMAL
STATUS: NORMAL

## 2017-12-05 ENCOUNTER — HOSPITAL ENCOUNTER (EMERGENCY)
Age: 42
Discharge: HOME | End: 2017-12-05
Attending: EMERGENCY MEDICINE
Payer: MEDICARE

## 2017-12-05 VITALS
SYSTOLIC BLOOD PRESSURE: 146 MMHG | HEART RATE: 86 BPM | TEMPERATURE: 98.1 F | WEIGHT: 212 LBS | DIASTOLIC BLOOD PRESSURE: 99 MMHG | BODY MASS INDEX: 39.01 KG/M2 | RESPIRATION RATE: 13 BRPM | OXYGEN SATURATION: 95 % | HEIGHT: 62 IN

## 2017-12-05 DIAGNOSIS — G57.93 NEUROPATHY INVOLVING BOTH LOWER EXTREMITIES: Primary | ICD-10-CM

## 2017-12-05 PROCEDURE — 6360000002 HC RX W HCPCS: Performed by: EMERGENCY MEDICINE

## 2017-12-05 PROCEDURE — 99284 EMERGENCY DEPT VISIT MOD MDM: CPT

## 2017-12-05 PROCEDURE — 96372 THER/PROPH/DIAG INJ SC/IM: CPT

## 2017-12-05 RX ORDER — KETOROLAC TROMETHAMINE 30 MG/ML
30 INJECTION, SOLUTION INTRAMUSCULAR; INTRAVENOUS ONCE
Status: COMPLETED | OUTPATIENT
Start: 2017-12-05 | End: 2017-12-05

## 2017-12-05 RX ORDER — PREDNISONE 20 MG/1
20 TABLET ORAL DAILY
Qty: 10 TABLET | Refills: 0 | Status: SHIPPED | OUTPATIENT
Start: 2017-12-05 | End: 2017-12-14

## 2017-12-05 RX ADMIN — KETOROLAC TROMETHAMINE 30 MG: 30 INJECTION, SOLUTION INTRAMUSCULAR at 13:42

## 2017-12-05 ASSESSMENT — PAIN SCALES - GENERAL
PAINLEVEL_OUTOF10: 9

## 2017-12-05 ASSESSMENT — PAIN DESCRIPTION - LOCATION: LOCATION: GENERALIZED

## 2017-12-05 ASSESSMENT — PAIN DESCRIPTION - PAIN TYPE: TYPE: CHRONIC PAIN

## 2017-12-05 ASSESSMENT — PAIN DESCRIPTION - PROGRESSION: CLINICAL_PROGRESSION: NOT CHANGED

## 2017-12-05 ASSESSMENT — ENCOUNTER SYMPTOMS: BACK PAIN: 1

## 2017-12-05 NOTE — ED PROVIDER NOTES
[facility]  [address] [place] [address]  Phone: [phone]  Fax: [phone]        Pt Name: Pearly Carrel  MRN: [id number]  [place] 1975  Date of evaluation: 12/5/17      CHIEF COMPLAINT       Chief Complaint   Patient presents with    Extremity Weakness     bilateral         HISTORY OF PRESENT ILLNESS  (Location/Symptom, Timing/Onset, Context/Setting, Quality, Duration, Modifying Factors, Severity.)    Pearly Carrel is a 38 y.o. female who presents With bilateral lower extremity pain. The patient states she has a burning pain in her mid back down both legs that her legs feel weak. She has been seen previously for this. She is previously had a cervical fusion. The patient was going to pain management. States her pain management doctor did just recently leave his office. The patient further states that she is seen orthopedics for this. She is currently undergoing physical therapy, all without any relief. The patient states that movement makes her symptoms worse. Her legs feel weak. She denies any blood in the urine or stool no fever no chills. The symptoms have been worsening over the last couple of weeks. But she's had the symptoms for the last couple of years       REVIEW OF SYSTEMS    (2-9 systems for level 4, 10 or more for level 5)     Review of Systems   Musculoskeletal: Positive for back pain. Bilateral leg pain   Neurological: Positive for weakness. All other systems reviewed and are negative. 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 SURGICAL HISTORY      has a past surgical history that includes Bladder surgery; Neck surgery (8/2012); knee surgery (Bilateral); Hysterectomy; Cholecystectomy; and Knee arthroscopy.     CURRENT MEDICATIONS       Previous Medications    BISACODYL (DULCOLAX) 5 MG EC TABLET    Take 5 mg by mouth daily as needed for Constipation    BUTALBITAL-ACETAMINOPHEN-CAFFEINE (FIORICET, ESGIC) -40 MG PER TABLET    Take 1 tablet by mouth every 4 hours as needed for Headaches    CLONAZEPAM (KLONOPIN) 0.5 MG TABLET    Take by mouth 2 times daily as needed     DICLOFENAC (CATAFLAM) 50 MG TABLET    Take 50 mg by mouth 2 times daily    DIHYDROERGOTAMINE (DHE) 1 MG/ML INJECTION    Infuse 1 mg intravenously once    DOCUSATE SODIUM (COLACE) 100 MG CAPSULE    Take 100 mg by mouth 2 times daily    DULOXETINE (CYMBALTA) 60 MG EXTENDED RELEASE CAPSULE    Take 90 mg by mouth daily    ESOMEPRAZOLE (NEXIUM) 40 MG DELAYED RELEASE CAPSULE    Take 1 capsule by mouth every morning (before breakfast)    GABAPENTIN (NEURONTIN) 300 MG CAPSULE    Take 300 mg by mouth 3 times daily    LITHIUM 300 MG TABLET    Take 300 mg by mouth 3 times daily    METOCLOPRAMIDE (REGLAN) 10 MG TABLET    Take 1 tablet by mouth 4 times daily WARNING:  May cause drowsiness. May impair ability to operate vehicles or machinery. Do not use in combination with alcohol. NONFORMULARY        PRIMIDONE (MYSOLINE) 50 MG TABLET    Take 50 mg by mouth 3 times daily    PROMETHAZINE (PHENERGAN) 25 MG TABLET    Take 25 mg by mouth 2 times daily as needed for Nausea    PROMETHAZINE (PHENERGAN) 25 MG TABLET    Take 1 tablet by mouth every 6 hours as needed for Nausea    TIZANIDINE (ZANAFLEX) 4 MG TABLET    Take 4 mg by mouth every 6 hours as needed    TRAZODONE (DESYREL) 100 MG TABLET    Take 300 mg by mouth nightly     VERAPAMIL (VERELAN) 240 MG EXTENDED RELEASE CAPSULE    Take 240 mg by mouth nightly       ALLERGIES     is allergic to latex; azithromycin; buspar [buspirone]; compazine [prochlorperazine maleate]; pcn [penicillins]; and zofran [ondansetron hcl]. FAMILY HISTORY     has no family status information on file. family history is not on file. SOCIAL HISTORY      reports that she has never smoked.  She has never used smokeless tobacco.

## 2017-12-14 ENCOUNTER — HOSPITAL ENCOUNTER (OUTPATIENT)
Dept: PAIN MANAGEMENT | Age: 42
Discharge: HOME | End: 2017-12-14
Payer: MEDICARE

## 2017-12-14 VITALS
SYSTOLIC BLOOD PRESSURE: 139 MMHG | WEIGHT: 212 LBS | BODY MASS INDEX: 39.01 KG/M2 | DIASTOLIC BLOOD PRESSURE: 96 MMHG | HEART RATE: 110 BPM | TEMPERATURE: 97.4 F | HEIGHT: 62 IN | RESPIRATION RATE: 20 BRPM

## 2017-12-14 DIAGNOSIS — M54.17 LUMBOSACRAL RADICULOPATHY: ICD-10-CM

## 2017-12-14 DIAGNOSIS — M54.12 CERVICAL RADICULOPATHY: Primary | ICD-10-CM

## 2017-12-14 PROCEDURE — 99204 OFFICE O/P NEW MOD 45 MIN: CPT | Performed by: PAIN MEDICINE

## 2017-12-14 PROCEDURE — 99204 OFFICE O/P NEW MOD 45 MIN: CPT

## 2017-12-14 RX ORDER — CLONAZEPAM 1 MG/1
1 TABLET ORAL 3 TIMES DAILY PRN
COMMUNITY
End: 2019-04-06 | Stop reason: ALTCHOICE

## 2017-12-14 RX ORDER — ACETAMINOPHEN, ASPIRIN AND CAFFEINE 250; 250; 65 MG/1; MG/1; MG/1
1 TABLET, FILM COATED ORAL EVERY 6 HOURS PRN
COMMUNITY
End: 2018-07-26 | Stop reason: ALTCHOICE

## 2017-12-14 RX ORDER — IBUPROFEN 800 MG/1
800 TABLET ORAL EVERY 6 HOURS PRN
COMMUNITY
End: 2018-03-22 | Stop reason: SDUPTHER

## 2017-12-14 ASSESSMENT — ENCOUNTER SYMPTOMS
HEMOPTYSIS: 0
SPUTUM PRODUCTION: 0
STRIDOR: 0
JAUNDICE: 0
BACK PAIN: 1
BOWEL INCONTINENCE: 0
UNUSUAL HAIR DISTRIBUTION: 0
EYE DISCHARGE: 0
SUSPICIOUS LESIONS: 0

## 2017-12-14 ASSESSMENT — PAIN SCALES - GENERAL: PAINLEVEL_OUTOF10: 8

## 2017-12-14 ASSESSMENT — PAIN DESCRIPTION - PAIN TYPE: TYPE: CHRONIC PAIN

## 2017-12-14 ASSESSMENT — PAIN DESCRIPTION - ORIENTATION: ORIENTATION: RIGHT;LEFT

## 2017-12-14 ASSESSMENT — PAIN DESCRIPTION - LOCATION: LOCATION: BACK;NECK;ARM;LEG

## 2017-12-14 NOTE — PROGRESS NOTES
neuromodulation in the form of spinal cord stimulation. Patient denies any new neurological symptoms. No bowel or bladder incontinence, no weakness, and no falling. Review of OARRS does not show any aberrant prescription behavior. Past Medical History:   Diagnosis Date    Anxiety     Bipolar 1 disorder ([address]     CMT (Charcot-Melissa-Tooth disease)     Depression     Psychiatric problem        Past Surgical History:   Procedure Laterality Date    BLADDER SURGERY      bladder stimulator    CHOLECYSTECTOMY      HYSTERECTOMY      KNEE ARTHROSCOPY      KNEE SURGERY Bilateral     NECK SURGERY  8/2012       Allergies   Allergen Reactions    Latex     Azithromycin     Buspar [Buspirone]     Compazine [Prochlorperazine Maleate]     Pcn [Penicillins]     Zofran [Ondansetron Hcl]      \"makes my headache worse\"         Current Outpatient Prescriptions:     clonazePAM (KLONOPIN) 1 MG tablet, Take 1 mg by mouth 2 times daily as needed . , Disp: , Rfl:     aspirin-acetaminophen-caffeine (EXCEDRIN MIGRAINE) 250-250-65 MG per tablet, Take 1 tablet by mouth every 6 hours as needed for Headaches, Disp: , Rfl:     ibuprofen (ADVIL;MOTRIN) 800 MG tablet, Take 800 mg by mouth every 6 hours as needed for Pain, Disp: , Rfl:     NONFORMULARY, , Disp: , Rfl:     gabapentin (NEURONTIN) 300 MG capsule, Take 300 mg by mouth 3 times daily, Disp: , Rfl:     tiZANidine (ZANAFLEX) 4 MG tablet, Take 4 mg by mouth every 6 hours as needed, Disp: , Rfl:     promethazine (PHENERGAN) 25 MG tablet, Take 25 mg by mouth 2 times daily as needed for Nausea, Disp: , Rfl:     traZODone (DESYREL) 100 MG tablet, Take 300 mg by mouth nightly , Disp: , Rfl:     History reviewed. No pertinent family history. Social History     Social History    Marital status:      Spouse name: N/A    Number of children: N/A    Years of education: N/A     Occupational History    Not on file.      Social History Main Topics    Smoking status: Never Smoker    Smokeless tobacco: Never Used    Alcohol use No    Drug use: No    Sexual activity: Not on file     Other Topics Concern    Not on file     Social History Narrative    No narrative on file       Review of Systems:  Review of Systems   Constitution: Positive for weakness. Negative for fever and night sweats. HENT: Negative for nosebleeds and stridor. Eyes: Negative for discharge. Cardiovascular: Negative for syncope. Respiratory: Negative for hemoptysis and sputum production. Endocrine: Negative for polyphagia. Skin: Negative for suspicious lesions and unusual hair distribution. Musculoskeletal: Positive for back pain and neck pain. Gastrointestinal: Negative for bowel incontinence and jaundice. Genitourinary: Negative for bladder incontinence and urgency. Neurological: Positive for headaches, numbness and tingling. Negative for tremors. 10 point review of system was performed and negative as pertinent to chief complaint, except as stated in HPI. Physical Exam:  BP (!) 139/96   Pulse 110   Temp 97.4 °F (36.3 °C)   Resp 20   Ht 5' 2\" (1.575 m)   Wt 212 lb (96.2 kg)   BMI 38.78 kg/m²     Physical Exam   Constitutional: She is oriented to person, place, and time and well-developed, well-nourished, and in no distress. Musculoskeletal:        Cervical back: She exhibits tenderness. Thoracic back: She exhibits tenderness. Lumbar back: She exhibits tenderness. Neurological: She is alert and oriented to person, place, and time. She displays weakness. Gait abnormal.   Nursing note and vitals reviewed.   In a wheelchair able to stand up but difficult to ambulate    Record/Diagnostics Review:    As above, I did review the imaging    Assessment:  Neck pain  Thoracic pain  Low back pain  Lumbar radiculopathy  Chronic pain syndrome      Treatment Plan:  DISCUSSION: Treatment options discussed with patient and all questions answered to patient's satisfaction. OARRS Review: Reviewed and acceptable for medications prescribed. TREATMENT OPTIONS:     Has had a significant workup over time however continues to have significant back pain with lower extremity weakness I will obtain a myelogram of her cervical thoracic and lumbar spine to rule out any compression.   We'll obtain records from her previous pain management physicians as well as her neurologist as to recent EMGs      Recommendations to be sent to referring physician: Dr Antonietta Matthews M.D.

## 2017-12-20 ENCOUNTER — HOSPITAL ENCOUNTER (OUTPATIENT)
Age: 42
Setting detail: OBSERVATION
Discharge: HOME | End: 2017-12-21
Attending: EMERGENCY MEDICINE | Admitting: INTERNAL MEDICINE
Payer: MEDICARE

## 2017-12-20 ENCOUNTER — APPOINTMENT (OUTPATIENT)
Dept: CT IMAGING | Age: 42
End: 2017-12-20
Payer: MEDICARE

## 2017-12-20 DIAGNOSIS — R53.1 GENERAL WEAKNESS: ICD-10-CM

## 2017-12-20 DIAGNOSIS — G60.0 CHARCOT-MARIE-TOOTH DISEASE: ICD-10-CM
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
## 2017-12-20 DIAGNOSIS — S09.90XA CLOSED HEAD INJURY, INITIAL ENCOUNTER: Primary | ICD-10-CM

## 2017-12-20 LAB
ALBUMIN SERPL-MCNC: 3.8 G/DL (ref 3.5–5.2)
ALBUMIN/GLOBULIN RATIO: 1.2 (ref 1–2.5)
ALP BLD-CCNC: 87 U/L (ref 35–104)
ALT SERPL-CCNC: 11 U/L (ref 5–33)
ANION GAP SERPL CALCULATED.3IONS-SCNC: 13 MMOL/L (ref 9–17)
AST SERPL-CCNC: 13 U/L
BILIRUB SERPL-MCNC: 0.36 MG/DL (ref 0.3–1.2)
BUN BLDV-MCNC: 10 MG/DL (ref 6–20)
BUN/CREAT BLD: ABNORMAL (ref 9–20)
CALCIUM SERPL-MCNC: 9.3 MG/DL (ref 8.6–10.4)
CHLORIDE BLD-SCNC: 102 MMOL/L (ref 98–107)
CO2: 26 MMOL/L (ref 20–31)
CREAT SERPL-MCNC: 0.67 MG/DL (ref 0.5–0.9)
GFR AFRICAN AMERICAN: >60 ML/MIN
GFR NON-AFRICAN AMERICAN: >60 ML/MIN
GFR SERPL CREATININE-BSD FRML MDRD: ABNORMAL ML/MIN/{1.73_M2}
GFR SERPL CREATININE-BSD FRML MDRD: ABNORMAL ML/MIN/{1.73_M2}
GLUCOSE BLD-MCNC: 103 MG/DL (ref 70–99)
HCT VFR BLD CALC: 41.8 % (ref 36.3–47.1)
HEMOGLOBIN: 12.8 G/DL (ref 11.9–15.1)
MCH RBC QN AUTO: 28.7 PG (ref 25.2–33.5)
MCHC RBC AUTO-ENTMCNC: 30.6 G/DL (ref 28.4–34.8)
MCV RBC AUTO: 93.7 FL (ref 82.6–102.9)
PDW BLD-RTO: 12.7 % (ref 11.8–14.4)
PLATELET # BLD: 299 K/UL (ref 138–453)
PMV BLD AUTO: 9.8 FL (ref 8.1–13.5)
POTASSIUM SERPL-SCNC: 4.5 MMOL/L (ref 3.7–5.3)
RBC # BLD: 4.46 M/UL (ref 3.95–5.11)
SODIUM BLD-SCNC: 141 MMOL/L (ref 135–144)
TOTAL PROTEIN: 6.9 G/DL (ref 6.4–8.3)
WBC # BLD: 6.9 K/UL (ref 3.5–11.3)

## 2017-12-20 PROCEDURE — 6370000000 HC RX 637 (ALT 250 FOR IP): Performed by: INTERNAL MEDICINE

## 2017-12-20 PROCEDURE — 99285 EMERGENCY DEPT VISIT HI MDM: CPT

## 2017-12-20 PROCEDURE — 96372 THER/PROPH/DIAG INJ SC/IM: CPT

## 2017-12-20 PROCEDURE — 96375 TX/PRO/DX INJ NEW DRUG ADDON: CPT

## 2017-12-20 PROCEDURE — 72125 CT NECK SPINE W/O DYE: CPT

## 2017-12-20 PROCEDURE — 99223 1ST HOSP IP/OBS HIGH 75: CPT | Performed by: PSYCHIATRY & NEUROLOGY

## 2017-12-20 PROCEDURE — 80053 COMPREHEN METABOLIC PANEL: CPT

## 2017-12-20 PROCEDURE — 6360000002 HC RX W HCPCS: Performed by: EMERGENCY MEDICINE

## 2017-12-20 PROCEDURE — 2580000003 HC RX 258: Performed by: EMERGENCY MEDICINE

## 2017-12-20 PROCEDURE — 6360000002 HC RX W HCPCS: Performed by: INTERNAL MEDICINE

## 2017-12-20 PROCEDURE — 72131 CT LUMBAR SPINE W/O DYE: CPT

## 2017-12-20 PROCEDURE — G0378 HOSPITAL OBSERVATION PER HR: HCPCS

## 2017-12-20 PROCEDURE — 85027 COMPLETE CBC AUTOMATED: CPT

## 2017-12-20 PROCEDURE — 1200000000 HC SEMI PRIVATE

## 2017-12-20 PROCEDURE — 96374 THER/PROPH/DIAG INJ IV PUSH: CPT

## 2017-12-20 PROCEDURE — 70450 CT HEAD/BRAIN W/O DYE: CPT

## 2017-12-20 PROCEDURE — 96376 TX/PRO/DX INJ SAME DRUG ADON: CPT

## 2017-12-20 PROCEDURE — 2580000003 HC RX 258: Performed by: INTERNAL MEDICINE

## 2017-12-20 RX ORDER — POTASSIUM CHLORIDE 20 MEQ/1
40 TABLET, EXTENDED RELEASE ORAL PRN
Status: DISCONTINUED | OUTPATIENT
Start: 2017-12-20 | End: 2017-12-21 | Stop reason: HOSPADM

## 2017-12-20 RX ORDER — GABAPENTIN 300 MG/1
300 CAPSULE ORAL 3 TIMES DAILY
Status: DISCONTINUED | OUTPATIENT
Start: 2017-12-20 | End: 2017-12-21 | Stop reason: HOSPADM

## 2017-12-20 RX ORDER — POTASSIUM CHLORIDE 7.45 MG/ML
10 INJECTION INTRAVENOUS PRN
Status: DISCONTINUED | OUTPATIENT
Start: 2017-12-20 | End: 2017-12-21 | Stop reason: HOSPADM

## 2017-12-20 RX ORDER — PROMETHAZINE HYDROCHLORIDE 25 MG/ML
25 INJECTION, SOLUTION INTRAMUSCULAR; INTRAVENOUS ONCE
Status: COMPLETED | OUTPATIENT
Start: 2017-12-20 | End: 2017-12-20

## 2017-12-20 RX ORDER — MORPHINE SULFATE 4 MG/ML
4 INJECTION, SOLUTION INTRAMUSCULAR; INTRAVENOUS ONCE
Status: COMPLETED | OUTPATIENT
Start: 2017-12-20 | End: 2017-12-20

## 2017-12-20 RX ORDER — CLONAZEPAM 1 MG/1
1 TABLET ORAL 2 TIMES DAILY PRN
Status: DISCONTINUED | OUTPATIENT
Start: 2017-12-20 | End: 2017-12-21 | Stop reason: HOSPADM

## 2017-12-20 RX ORDER — FENTANYL CITRATE 50 UG/ML
25 INJECTION, SOLUTION INTRAMUSCULAR; INTRAVENOUS ONCE
Status: COMPLETED | OUTPATIENT
Start: 2017-12-20 | End: 2017-12-20

## 2017-12-20 RX ORDER — MORPHINE SULFATE 2 MG/ML
2 INJECTION, SOLUTION INTRAMUSCULAR; INTRAVENOUS
Status: DISCONTINUED | OUTPATIENT
Start: 2017-12-20 | End: 2017-12-21 | Stop reason: HOSPADM

## 2017-12-20 RX ORDER — DIPHENHYDRAMINE HYDROCHLORIDE 50 MG/ML
25 INJECTION INTRAMUSCULAR; INTRAVENOUS EVERY 6 HOURS PRN
Status: DISCONTINUED | OUTPATIENT
Start: 2017-12-20 | End: 2017-12-21 | Stop reason: HOSPADM

## 2017-12-20 RX ORDER — BISACODYL 10 MG
10 SUPPOSITORY, RECTAL RECTAL DAILY PRN
Status: DISCONTINUED | OUTPATIENT
Start: 2017-12-20 | End: 2017-12-21 | Stop reason: HOSPADM

## 2017-12-20 RX ORDER — SODIUM CHLORIDE 0.9 % (FLUSH) 0.9 %
10 SYRINGE (ML) INJECTION EVERY 12 HOURS SCHEDULED
Status: DISCONTINUED | OUTPATIENT
Start: 2017-12-20 | End: 2017-12-21 | Stop reason: HOSPADM

## 2017-12-20 RX ORDER — ACETAMINOPHEN 325 MG/1
650 TABLET ORAL EVERY 4 HOURS PRN
Status: DISCONTINUED | OUTPATIENT
Start: 2017-12-20 | End: 2017-12-21 | Stop reason: HOSPADM

## 2017-12-20 RX ORDER — NICOTINE 21 MG/24HR
1 PATCH, TRANSDERMAL 24 HOURS TRANSDERMAL DAILY PRN
Status: DISCONTINUED | OUTPATIENT
Start: 2017-12-20 | End: 2017-12-21 | Stop reason: HOSPADM

## 2017-12-20 RX ORDER — PROMETHAZINE HYDROCHLORIDE 25 MG/ML
12.5 INJECTION, SOLUTION INTRAMUSCULAR; INTRAVENOUS EVERY 6 HOURS PRN
Status: DISCONTINUED | OUTPATIENT
Start: 2017-12-20 | End: 2017-12-21 | Stop reason: HOSPADM

## 2017-12-20 RX ORDER — METOCLOPRAMIDE HYDROCHLORIDE 5 MG/ML
10 INJECTION INTRAMUSCULAR; INTRAVENOUS ONCE
Status: COMPLETED | OUTPATIENT
Start: 2017-12-20 | End: 2017-12-20

## 2017-12-20 RX ORDER — TIZANIDINE 4 MG/1
4 TABLET ORAL EVERY 6 HOURS PRN
Status: DISCONTINUED | OUTPATIENT
Start: 2017-12-20 | End: 2017-12-21 | Stop reason: HOSPADM

## 2017-12-20 RX ORDER — DIPHENHYDRAMINE HYDROCHLORIDE 50 MG/ML
25 INJECTION INTRAMUSCULAR; INTRAVENOUS ONCE
Status: COMPLETED | OUTPATIENT
Start: 2017-12-20 | End: 2017-12-20

## 2017-12-20 RX ORDER — POTASSIUM CHLORIDE 20MEQ/15ML
40 LIQUID (ML) ORAL PRN
Status: DISCONTINUED | OUTPATIENT
Start: 2017-12-20 | End: 2017-12-21 | Stop reason: HOSPADM

## 2017-12-20 RX ORDER — MORPHINE SULFATE 4 MG/ML
4 INJECTION, SOLUTION INTRAMUSCULAR; INTRAVENOUS
Status: DISCONTINUED | OUTPATIENT
Start: 2017-12-20 | End: 2017-12-21 | Stop reason: HOSPADM

## 2017-12-20 RX ORDER — 0.9 % SODIUM CHLORIDE 0.9 %
1000 INTRAVENOUS SOLUTION INTRAVENOUS ONCE
Status: COMPLETED | OUTPATIENT
Start: 2017-12-20 | End: 2017-12-20

## 2017-12-20 RX ORDER — SODIUM CHLORIDE 0.9 % (FLUSH) 0.9 %
10 SYRINGE (ML) INJECTION PRN
Status: DISCONTINUED | OUTPATIENT
Start: 2017-12-20 | End: 2017-12-21 | Stop reason: HOSPADM

## 2017-12-20 RX ORDER — MAGNESIUM SULFATE 1 G/100ML
1 INJECTION INTRAVENOUS PRN
Status: DISCONTINUED | OUTPATIENT
Start: 2017-12-20 | End: 2017-12-21 | Stop reason: HOSPADM

## 2017-12-20 RX ORDER — TRAZODONE HYDROCHLORIDE 100 MG/1
300 TABLET ORAL NIGHTLY
Status: DISCONTINUED | OUTPATIENT
Start: 2017-12-20 | End: 2017-12-21 | Stop reason: HOSPADM

## 2017-12-20 RX ORDER — HYDROCODONE BITARTRATE AND ACETAMINOPHEN 5; 325 MG/1; MG/1
1 TABLET ORAL EVERY 4 HOURS PRN
Status: DISCONTINUED | OUTPATIENT
Start: 2017-12-20 | End: 2017-12-21 | Stop reason: HOSPADM

## 2017-12-20 RX ORDER — ACETAMINOPHEN, ASPIRIN AND CAFFEINE 250; 250; 65 MG/1; MG/1; MG/1
1 TABLET, FILM COATED ORAL EVERY 6 HOURS PRN
Status: DISCONTINUED | OUTPATIENT
Start: 2017-12-20 | End: 2017-12-21 | Stop reason: HOSPADM

## 2017-12-20 RX ADMIN — HYDROCODONE BITARTRATE AND ACETAMINOPHEN 1 TABLET: 5; 325 TABLET ORAL at 21:51

## 2017-12-20 RX ADMIN — MORPHINE SULFATE 4 MG: 4 INJECTION INTRAVENOUS at 13:37

## 2017-12-20 RX ADMIN — CLONAZEPAM 1 MG: 1 TABLET ORAL at 21:51

## 2017-12-20 RX ADMIN — MORPHINE SULFATE 4 MG: 4 INJECTION INTRAVENOUS at 20:49

## 2017-12-20 RX ADMIN — GABAPENTIN 300 MG: 300 CAPSULE ORAL at 21:51

## 2017-12-20 RX ADMIN — FENTANYL CITRATE 25 MCG: 50 INJECTION INTRAMUSCULAR; INTRAVENOUS at 12:15

## 2017-12-20 RX ADMIN — DIPHENHYDRAMINE HYDROCHLORIDE 25 MG: 50 INJECTION, SOLUTION INTRAMUSCULAR; INTRAVENOUS at 12:15

## 2017-12-20 RX ADMIN — SODIUM CHLORIDE 1000 ML: 9 INJECTION, SOLUTION INTRAVENOUS at 12:14

## 2017-12-20 RX ADMIN — ENOXAPARIN SODIUM 40 MG: 40 INJECTION SUBCUTANEOUS at 18:34

## 2017-12-20 RX ADMIN — PROMETHAZINE HYDROCHLORIDE 12.5 MG: 25 INJECTION INTRAMUSCULAR; INTRAVENOUS at 21:46

## 2017-12-20 RX ADMIN — TRAZODONE HYDROCHLORIDE 300 MG: 100 TABLET ORAL at 21:51

## 2017-12-20 RX ADMIN — TIZANIDINE 4 MG: 4 TABLET ORAL at 21:51

## 2017-12-20 RX ADMIN — MORPHINE SULFATE 2 MG: 2 INJECTION, SOLUTION INTRAMUSCULAR; INTRAVENOUS at 23:43

## 2017-12-20 RX ADMIN — Medication 10 ML: at 20:49

## 2017-12-20 RX ADMIN — MORPHINE SULFATE 2 MG: 2 INJECTION, SOLUTION INTRAMUSCULAR; INTRAVENOUS at 18:30

## 2017-12-20 RX ADMIN — PROMETHAZINE HYDROCHLORIDE 25 MG: 25 INJECTION INTRAMUSCULAR; INTRAVENOUS at 13:37

## 2017-12-20 RX ADMIN — METOCLOPRAMIDE 10 MG: 5 INJECTION, SOLUTION INTRAMUSCULAR; INTRAVENOUS at 12:15

## 2017-12-20 RX ADMIN — DIPHENHYDRAMINE HYDROCHLORIDE 25 MG: 50 INJECTION, SOLUTION INTRAMUSCULAR; INTRAVENOUS at 19:05

## 2017-12-20 ASSESSMENT — PAIN SCALES - GENERAL
PAINLEVEL_OUTOF10: 9
PAINLEVEL_OUTOF10: 7
PAINLEVEL_OUTOF10: 9
PAINLEVEL_OUTOF10: 8
PAINLEVEL_OUTOF10: 9
PAINLEVEL_OUTOF10: 9
PAINLEVEL_OUTOF10: 10
PAINLEVEL_OUTOF10: 6
PAINLEVEL_OUTOF10: 8
PAINLEVEL_OUTOF10: 9

## 2017-12-20 ASSESSMENT — PAIN DESCRIPTION - PROGRESSION
CLINICAL_PROGRESSION: NOT CHANGED
CLINICAL_PROGRESSION: GRADUALLY IMPROVING
CLINICAL_PROGRESSION: NOT CHANGED
CLINICAL_PROGRESSION: GRADUALLY IMPROVING

## 2017-12-20 ASSESSMENT — PAIN DESCRIPTION - PAIN TYPE
TYPE: ACUTE PAIN;CHRONIC PAIN
TYPE: ACUTE PAIN
TYPE: ACUTE PAIN;CHRONIC PAIN
TYPE: ACUTE PAIN
TYPE: ACUTE PAIN

## 2017-12-20 ASSESSMENT — PAIN DESCRIPTION - FREQUENCY
FREQUENCY: CONTINUOUS

## 2017-12-20 ASSESSMENT — PAIN DESCRIPTION - LOCATION
LOCATION: GENERALIZED
LOCATION: BACK;GENERALIZED
LOCATION: GENERALIZED;BACK;LEG;HEAD
LOCATION: GENERALIZED;BACK;LEG
LOCATION: BACK;LEG
LOCATION: BACK;GENERALIZED

## 2017-12-20 ASSESSMENT — ENCOUNTER SYMPTOMS
NAUSEA: 1
SORE THROAT: 0
DIARRHEA: 0
SHORTNESS OF BREATH: 0
COUGH: 0
RHINORRHEA: 0
BACK PAIN: 1
VOMITING: 1
ABDOMINAL PAIN: 0
EYE REDNESS: 0

## 2017-12-20 ASSESSMENT — PAIN DESCRIPTION - ONSET
ONSET: ON-GOING
ONSET: ON-GOING

## 2017-12-20 ASSESSMENT — PAIN DESCRIPTION - DESCRIPTORS
DESCRIPTORS: ACHING
DESCRIPTORS: CONSTANT
DESCRIPTORS: CONSTANT
DESCRIPTORS: ACHING

## 2017-12-20 ASSESSMENT — PAIN DESCRIPTION - ORIENTATION: ORIENTATION: RIGHT;LEFT

## 2017-12-20 NOTE — ED NOTES
Pt ambulated with unsteady gait and assistance to and from restroom.  Neurologist at [facility] [address], [address] [facility]  12/20/17 1987

## 2017-12-20 NOTE — FLOWSHEET NOTE
Pt arrived to room 502 from ED. Pt transferred from stretcher to bed with appropriate measures. Vitals are stable. Pt is hooked up to telemetry. Bed is locked and in the lowest position with the bed alarm on. Call light education given and within reach. All questions answered at this time. Will continue to monitor.

## 2017-12-20 NOTE — ED PROVIDER NOTES
[address] [facility]  eMERGENCY dEPARTMENT eNCOUnter      Pt Name: Richy Crenshaw  MRN: [id number]  [place] 1975  Date of evaluation: 12/20/2017  PCP:    Ines Kate       Chief Complaint   Patient presents with    Fall     Pt reports that she has generalized body pain to her back and legs s/p fall, denies hitting head or LOC. Pt reports numbness and tingling to BLE x1 week    Generalized Body Aches    Numbness    Tingling         HISTORY OF PRESENT ILLNESS    Hyla Kayser Lorenz Blackbird is a 38 y.o. female who presents After a fall this morning. She was in her wheelchair trying to get into the bathroom and attempting to transfer. She fell on the floor in a supine position. There is questionable loss of consciousness. She has pain all over including her head, neck, back, arms and legs. She has chronic neuropathy with chronic numbness weakness paresthesias and weakness. She states she's been having more right sided weakness recently and now and also including her right upper extremity. She has severe headache with nausea photophobia. There is no change in vision. 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 When asked when her symptoms started she states last week however she's been dealing with this for quite some time. She has a neurologist, is on gabapentin Zanaflex and trazodone. REVIEW OF SYSTEMS         Review of Systems   Constitutional: Positive for chills and fatigue. Negative for fever. HENT: Negative for rhinorrhea and sore throat. Eyes: Negative for redness. Respiratory: Negative for cough and shortness of breath. Cardiovascular: Negative for chest pain and leg swelling. Gastrointestinal: Positive for nausea and vomiting. Negative for abdominal pain and diarrhea. Genitourinary: Negative for dysuria and flank pain.    Musculoskeletal: Positive for arthralgias, back pain, joint swelling, myalgias, neck pain and Abnormal; Notable for the following:        Result Value    Glucose 103 (*)     All other components within normal limits   CBC       Normal blood counts and chemistries  EMERGENCY DEPARTMENT COURSE:   Vitals:    Vitals:    12/20/17 1202 12/20/17 1304 12/20/17 1347 12/20/17 1402   BP: 100/78 99/64 120/89 124/84   Pulse: 78 76 89 90   Resp: 17 15 14 14   Temp:       TempSrc:       SpO2: 99% 100% 100% 98%   Weight:       Height:         -------------------------  BP: 124/84, Temp: 97.3 °F (36.3 °C), Pulse: 90, Resp: 14    Headache is resolved with migraine cocktail and analgesics here. She has persistent neck and low back pain. She has persistent weakness and is reluctant to attempt ambulation for fear of falling because of persistent weakness and numbness. CRITICAL CARE:   IP CONSULT TO PRIMARY CARE PROVIDER  IP CONSULT TO NEUROLOGY  IP CONSULT TO SOCIAL WORK      CONSULTS:  Consult to neurology, social work, PCP, and admitting team.    PROCEDURES:  None    FINAL IMPRESSION      1. Closed head injury, initial encounter    2. Fall, initial encounter    3. Charcot-Melissa-Tooth disease    4. General weakness          DISPOSITION/PLAN   DISPOSITION        PATIENT REFERRED TO:  No follow-up provider specified. DISCHARGE MEDICATIONS:  New Prescriptions    No medications on file       (Please note that portions of this note were completed with a voice recognition program.  Efforts were made to edit the dictations but occasionally words are mis-transcribed. )    [place] MD, F.A.C.E.P.   Attending Emergency Physician                      Laquita Chamberlain MD  12/20/17 8626

## 2017-12-20 NOTE — CARE COORDINATION
Case Management Initial Discharge Plan  Wilber Kim,         Readmission Risk              Readmission Risk:        16.75       Age 74 or Greater:  0    Admitted from SNF or Requires Paid or Family Care:  0    Currently has CHF,COPD,ARF,CRI,or is on dialysis:  0    Takes more than 5 Prescription Medications:  0    Takes Digoxin,Insulin,Anticoagulants,Narcotics or ASA/Plavix:  [address] in Past 12 Months:  10    On Disability:  3    Patient Considers own Health:  3.75            Met with:patient to discuss discharge plans. Information verified: address, contacts, phone number, , insurance Yes  PCP: Marjorie Valera DO  Date of last visit: Dec 11    Insurance Provider: Ever Jefferson, and [employer]    Discharge Planning  Current Residence:  Private Residence (2 steps to enter 1 story home)  Living Arrangements:  Spouse/Significant Other, Children (12 and 14y/o)   Home has 1 stories/2 stairs to climb  Support Systems:  Spouse/Significant Other, Children, Family Members  Current Services PTA:  Durable Medical Equipment Supplier: has a wheelchair and a walker. Patient able to perform ADL's:Assisted  DME used to aid ambulation prior to admission: wheelchair    Potential Assistance Needed:  N/A    Pharmacy: [facility] in [address] [place] [address] Medications:  No  Does patient want to participate in local refill/ meds to beds program?  N/A    Patient agreeable to home care: No, previously utilized [facility]  Freedom of choice provided:  n/a      Type of Home Care Services:  None  Patient expects to be discharged to:  discharge needs to be determined.     Prior SNF/Rehab Placement and Facility:   Agreeable to SNF/Rehab: Yes  Nemo of choice provided: no   Evaluation: no    Expected Discharge date:  17  Follow Up Appointment: Best Day/ Time:      Transportation provider: mother-in-law  Transportation arrangements needed for discharge: No    Discharge Plan: discharge needs to be determined.         Electronically signed by Indigo Bernard RN on 12/20/17 at 3:48 PM

## 2017-12-20 NOTE — ED NOTES
Report given to RN on floor. Pt to be transported in NAD w/ rr even and unlabored.      Kristal Dee, RN  12/20/17 9442

## 2017-12-20 NOTE — ED NOTES
Pt arrived to ED alert and oriented x4. Pt reports generalized body pain and weakness s/p fall. Pt reports that she feel today PTA and could not get up, states \"I didn't have the energy to get myself back in my wheelchair\". Pt denies hitting head or LOC, reports head, neck back, and leg pain. Per Shubham Aviles, Paramedic, pt had hard time getting out of vehicle upon arrival but ambulated from vehicle to wheelchair. Pt reports numbness and tingling to BLE as well as BUE from elbow up to shoulders. Pt appears weaker on the right side, RUE and RLE, than the left. Pt states that she recently began seeing a new pain doctor but reports she has not \"done anything yet\". Pt placed on cardiac monitor, continuous pulse ox, and BP cuff. RR even and unlabored. NAD noted. Will continue to monitor.       Fabi Fernandez RN  12/20/17 8688

## 2017-12-20 NOTE — CONSULTS
NEUROLOGY INPATIENT CONSULT NOTE    12/20/2017         Jose L Dumas is a  38 y.o. female admitted on 12/20/2017 with  No admission diagnoses are documented for this encounter. History is obtained mostly from the patient and the medical record and from the caregivers. Chart is reviewed and patient is examined. Briefly, this is a  38 y.o. female admitted on 12/20/2017 who presents After a fall this morning. She was in her wheelchair trying to get into the bathroom and attempting to transfer. She fell on the floor in a supine position. There is questionable loss of consciousness. She has pain all over including her head, neck, back, arms and legs. She has chronic neuropathy with chronic numbness weakness paresthesias and weakness. She states she's been having more right sided weakness recently and now and also including her right upper extremity. She has severe headache with nausea photophobia. There is no change in vision. 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 When asked when her symptoms started she states last week however she's been dealing with this for quite some time. She has a neurologist, is on gabapentin, Zanaflex and trazodone. She is also on clonazepam 1 mg twice a day, ibuprofen 800 mg every 6 hours when necessary pain and takes Excedrin Migraine. There are emergency department visit notes from October 30, November 3, November 16, and December 5, 2017, making this the sixth visit in the last less than 2 months. The first 2 of these visits were for headache, the November 16 visit was for bilateral leg weakness and pain and falls at home over the prior 2 days. That note makes reference to having been seen at [place] [id number] 2 days before that and [facility] about a week before that.   The December 5 visit was for bilateral lower extremity pain with increasing weakness for the prior couple weeks, though agreeing that she had these symptoms for the prior chills   HEENT Negative for ear discharge, ear pain, nosebleed   Eyes Negative for photophobia, pain and discharge   Respiratory Negative for hemoptysis and sputum   Cardiovascular Negative for orthopnea, claudication and PND   Gastrointestinal Negative for abdominal pain, diarrhea, blood in stool   Musculoskeletal Negative for joint pain, negative for myalgia   Skin Negative for rash or itching   Endo/heme/allergies Negative for polydipsia, environmental allergy   Psychiatric Negative for suicidal ideation. Patient is not anxious           Objective:   /89   Pulse 94   Temp 97.3 °F (36.3 °C) (Oral)   Resp 13   Ht 5' 2\" (1.575 m)   Wt 212 lb (96.2 kg)   SpO2 98%   BMI 38.78 kg/m²     Blood pressure range: Systolic (08FDI), OUL:457 , Min:99 , CZU:216   ; Diastolic (08CUV), TZH:47, Min:64, Max:89      Continuous infusions:     ON EXAMINATION:  GENERAL  Appears comfortable and in no distress   HEENT  NC/ AT   NECK  Supple and no bruits heard   MENTAL STATUS:  Alert, oriented, intact memory, no confusion, normal speech, normal language, no hallucination or delusion.   Asthenic demeanor with flat affect    CRANIAL NERVES: II     -      Visual fields intact to confrontation  III,IV,VI -  PRRRE EOMI without nystagmus, no afferent defect, no NATE, no ptosis  V     -     Normal facial sensation  VII    -     Normal facial symmetry  VIII   -     Intact hearing  IX,X -     Symmetrical palate  XI    -     Symmetrical shoulder shrug  XII   -     Midline tongue, no atrophy    MOTOR FUNCTION:  Normal bulk, normal tone, giveaway antalgic weakness in all 4 limbs, with a pattern suggesting more weakness in right upper and lower extremity than left, but when both sides are tested concurrently she always gives way in both sides concurrently and to the same degree; there is moderate pes cavus, no hammertoes, no apparent focal atrophy although she is moderately obese which could make evaluating intrinsic foot muscle atrophy unreliable on exam; no heel cord contractures; patient complains of right leg pain even with passive dorsiflexion of the foot; no involuntary movements, no tremor   SENSORY FUNCTION:  Objectively intact  Touch / pin, but complains of paresthesias in her feet. CEREBELLAR FUNCTION: No upper limb cerebellar signs    REFLEX FUNCTION:  Symmetric, 1/ 4UE's, 0/4 LE's, plantars flexor    STATION and GAIT  Not tested; Note patient complains of prominent back and leg pain with walking or standing          Data:    Lab Results:     Lab Results   Component Value Date    ALT 11 12/20/2017    AST 13 12/20/2017    INR 0.9 01/17/2013     Hematology:  Recent Labs      12/20/17   1232   WBC  6.9   HGB  12.8   HCT  41.8   PLT  299     Chemistry:  Recent Labs      12/20/17   1232   NA  141   K  4.5   CL  102   CO2  26   GLUCOSE  103*   BUN  10   CREATININE  0.67   CALCIUM  9.3     Recent Labs      12/20/17   1232   PROT  6.9   LABALBU  3.8   AST  13   ALT  11             Diagnostic data reviewed:      Impression and Plan: Ms. Caryn Page is a 38 y.o. 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 If she has CMT present examiner would consider it to be relatively mild given minimal foot deformity, absence of apparent focal atrophy, and only giveaway weakness. Consider that there is substantial functional overlay. From the neurologic standpoint itself she does not require hospitalization. Certainly there is no need for hospital-based observation nor acute treatment different from what she is presently getting on a neurologic basis. She is neurologically stable and clear. ED physician indicates the patient is being admitted principally for social reasons and in order to facilitate transfer to a skilled nursing/rehabilitation setting.        Discussed with

## 2017-12-20 NOTE — ED NOTES
Pt resting in bed w/ family at bedside. Pt in NAD. Pt updated on POC and awaiting bed on floor to be cleaned. Will conitnue to monitor.      Ilda Mcguire RN  12/20/17 [id number]

## 2017-12-21 VITALS
SYSTOLIC BLOOD PRESSURE: 103 MMHG | BODY MASS INDEX: 40.37 KG/M2 | HEIGHT: 62 IN | OXYGEN SATURATION: 95 % | DIASTOLIC BLOOD PRESSURE: 71 MMHG | RESPIRATION RATE: 16 BRPM | HEART RATE: 98 BPM | TEMPERATURE: 98 F | WEIGHT: 219.36 LBS

## 2017-12-21 PROBLEM — F32.A DEPRESSION: Status: ACTIVE | Noted: 2017-05-18
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
## 2017-12-21 PROBLEM — G43.919 INTRACTABLE MIGRAINE: Status: ACTIVE | Noted: 2017-07-30

## 2017-12-21 PROBLEM — R29.898 BILATERAL LEG WEAKNESS: Status: ACTIVE | Noted: 2017-11-17

## 2017-12-21 PROBLEM — G43.019 INTRACTABLE COMMON MIGRAINE: Status: ACTIVE | Noted: 2017-09-08

## 2017-12-21 PROBLEM — R45.851 SUICIDAL IDEATION: Status: ACTIVE | Noted: 2017-04-11

## 2017-12-21 PROBLEM — Z91.81 STATUS POST FALL: Status: ACTIVE | Noted: 2017-12-21
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
## 2017-12-21 PROBLEM — F31.9 BIPOLAR DISORDER (HCC): Status: ACTIVE | Noted: 2017-05-16

## 2017-12-21 PROBLEM — R51.9 HEADACHE: Status: ACTIVE | Noted: 2017-05-31

## 2017-12-21 LAB
ALBUMIN SERPL-MCNC: 3.8 G/DL (ref 3.5–5.2)
ALBUMIN/GLOBULIN RATIO: 1.3 (ref 1–2.5)
ALP BLD-CCNC: 103 U/L (ref 35–104)
ALT SERPL-CCNC: 23 U/L (ref 5–33)
ANION GAP SERPL CALCULATED.3IONS-SCNC: 18 MMOL/L (ref 9–17)
AST SERPL-CCNC: 38 U/L
BILIRUB SERPL-MCNC: 0.55 MG/DL (ref 0.3–1.2)
BUN BLDV-MCNC: 7 MG/DL (ref 6–20)
BUN/CREAT BLD: ABNORMAL (ref 9–20)
CALCIUM SERPL-MCNC: 8.3 MG/DL (ref 8.6–10.4)
CHLORIDE BLD-SCNC: 97 MMOL/L (ref 98–107)
CO2: 22 MMOL/L (ref 20–31)
CREAT SERPL-MCNC: 0.62 MG/DL (ref 0.5–0.9)
GFR AFRICAN AMERICAN: >60 ML/MIN
GFR NON-AFRICAN AMERICAN: >60 ML/MIN
GFR SERPL CREATININE-BSD FRML MDRD: ABNORMAL ML/MIN/{1.73_M2}
GFR SERPL CREATININE-BSD FRML MDRD: ABNORMAL ML/MIN/{1.73_M2}
GLUCOSE BLD-MCNC: 88 MG/DL (ref 70–99)
HCT VFR BLD CALC: 41 % (ref 36.3–47.1)
HEMOGLOBIN: 12.4 G/DL (ref 11.9–15.1)
INR BLD: 0.9
MCH RBC QN AUTO: 28.4 PG (ref 25.2–33.5)
MCHC RBC AUTO-ENTMCNC: 30.2 G/DL (ref 28.4–34.8)
MCV RBC AUTO: 94 FL (ref 82.6–102.9)
PDW BLD-RTO: 13.3 % (ref 11.8–14.4)
PLATELET # BLD: 335 K/UL (ref 138–453)
PMV BLD AUTO: 10 FL (ref 8.1–13.5)
POTASSIUM SERPL-SCNC: 3.8 MMOL/L (ref 3.7–5.3)
PROTHROMBIN TIME: 10 SEC (ref 9.4–12.6)
RBC # BLD: 4.36 M/UL (ref 3.95–5.11)
SODIUM BLD-SCNC: 137 MMOL/L (ref 135–144)
TOTAL PROTEIN: 6.8 G/DL (ref 6.4–8.3)
WBC # BLD: 6.8 K/UL (ref 3.5–11.3)

## 2017-12-21 PROCEDURE — 85610 PROTHROMBIN TIME: CPT

## 2017-12-21 PROCEDURE — G8987 SELF CARE CURRENT STATUS: HCPCS

## 2017-12-21 PROCEDURE — G8988 SELF CARE GOAL STATUS: HCPCS

## 2017-12-21 PROCEDURE — 97162 PT EVAL MOD COMPLEX 30 MIN: CPT

## 2017-12-21 PROCEDURE — 6360000002 HC RX W HCPCS: Performed by: INTERNAL MEDICINE

## 2017-12-21 PROCEDURE — G0378 HOSPITAL OBSERVATION PER HR: HCPCS

## 2017-12-21 PROCEDURE — 99233 SBSQ HOSP IP/OBS HIGH 50: CPT | Performed by: INTERNAL MEDICINE

## 2017-12-21 PROCEDURE — 6370000000 HC RX 637 (ALT 250 FOR IP): Performed by: INTERNAL MEDICINE

## 2017-12-21 PROCEDURE — 96376 TX/PRO/DX INJ SAME DRUG ADON: CPT

## 2017-12-21 PROCEDURE — 2580000003 HC RX 258: Performed by: INTERNAL MEDICINE

## 2017-12-21 PROCEDURE — 97166 OT EVAL MOD COMPLEX 45 MIN: CPT

## 2017-12-21 PROCEDURE — 80053 COMPREHEN METABOLIC PANEL: CPT

## 2017-12-21 PROCEDURE — 99221 1ST HOSP IP/OBS SF/LOW 40: CPT | Performed by: PHYSICAL MEDICINE & REHABILITATION

## 2017-12-21 PROCEDURE — 85027 COMPLETE CBC AUTOMATED: CPT

## 2017-12-21 PROCEDURE — G8979 MOBILITY GOAL STATUS: HCPCS

## 2017-12-21 PROCEDURE — 36415 COLL VENOUS BLD VENIPUNCTURE: CPT

## 2017-12-21 PROCEDURE — G8978 MOBILITY CURRENT STATUS: HCPCS

## 2017-12-21 PROCEDURE — 96372 THER/PROPH/DIAG INJ SC/IM: CPT

## 2017-12-21 PROCEDURE — 97535 SELF CARE MNGMENT TRAINING: CPT

## 2017-12-21 PROCEDURE — 99225 PR SBSQ OBSERVATION CARE/DAY 25 MINUTES: CPT | Performed by: NURSE PRACTITIONER

## 2017-12-21 PROCEDURE — 97530 THERAPEUTIC ACTIVITIES: CPT

## 2017-12-21 RX ADMIN — GABAPENTIN 300 MG: 300 CAPSULE ORAL at 14:43

## 2017-12-21 RX ADMIN — GABAPENTIN 300 MG: 300 CAPSULE ORAL at 08:12

## 2017-12-21 RX ADMIN — TIZANIDINE 4 MG: 4 TABLET ORAL at 08:12

## 2017-12-21 RX ADMIN — DIPHENHYDRAMINE HYDROCHLORIDE 25 MG: 50 INJECTION, SOLUTION INTRAMUSCULAR; INTRAVENOUS at 00:42

## 2017-12-21 RX ADMIN — ENOXAPARIN SODIUM 40 MG: 40 INJECTION SUBCUTANEOUS at 08:12

## 2017-12-21 RX ADMIN — HYDROCODONE BITARTRATE AND ACETAMINOPHEN 1 TABLET: 5; 325 TABLET ORAL at 06:42

## 2017-12-21 RX ADMIN — HYDROCODONE BITARTRATE AND ACETAMINOPHEN 1 TABLET: 5; 325 TABLET ORAL at 11:04

## 2017-12-21 RX ADMIN — Medication 10 ML: at 08:12

## 2017-12-21 ASSESSMENT — PAIN DESCRIPTION - LOCATION
LOCATION: BACK
LOCATION: BACK

## 2017-12-21 ASSESSMENT — PAIN DESCRIPTION - ORIENTATION: ORIENTATION: LOWER

## 2017-12-21 ASSESSMENT — PAIN DESCRIPTION - PROGRESSION: CLINICAL_PROGRESSION: NOT CHANGED

## 2017-12-21 ASSESSMENT — PAIN SCALES - GENERAL
PAINLEVEL_OUTOF10: 9
PAINLEVEL_OUTOF10: 7
PAINLEVEL_OUTOF10: 9
PAINLEVEL_OUTOF10: 9
PAINLEVEL_OUTOF10: 7

## 2017-12-21 ASSESSMENT — PAIN DESCRIPTION - FREQUENCY: FREQUENCY: CONTINUOUS

## 2017-12-21 ASSESSMENT — PAIN DESCRIPTION - PAIN TYPE
TYPE: CHRONIC PAIN
TYPE: CHRONIC PAIN

## 2017-12-21 ASSESSMENT — PAIN DESCRIPTION - ONSET: ONSET: AWAKENED FROM SLEEP

## 2017-12-21 ASSESSMENT — PAIN DESCRIPTION - DESCRIPTORS: DESCRIPTORS: ACHING;THROBBING

## 2017-12-21 NOTE — CARE COORDINATION
Discharge [facility] Case Management Department  Written by: Juan J Nettles RN    Patient Name: Emiliana Linares  Attending Provider: No att. providers found  Admit Date: 2017 11:29 AM  MRN: [id number]  Account: [de-identified]                     : 1975  Discharge Date: 2017      Disposition: home w/[facility]. Faxed AVS and order.     Juan J Nettles RN

## 2017-12-21 NOTE — FLOWSHEET NOTE
S, Patient had fallen out of wheelchair at home. She expressed anxiety about her condition and her being able to be with her family for [place]. She said her  and step son had visited her. She is hoping she will qualify for home health care. She indicated that she was also still grieving the death of her father. He  in January. O.  Patient was sitting in a chair with breakfast in front of her. She was very subdued and talked in a manner that made her come across as depressed. A. She said her and her family sometimes attend [facility] [facility] but that the loud music hurts her ears and causes pain in her body at times. She is struggling with the loss of her father who she says she was very close to. I think she needs spiritual support that assures her of God's support and also some help in working through her grief. JOSSELIN.  I prayed with the patient and talked with her about her father and how it is hard to lose someone that close to us, and how this time of year makes that sense of loss even bigger. She expressed gratitude for the visit and I told her that if she needed to talk further the chaplains are always available. 17 [id number]   Encounter Summary   Services provided to: Patient   Referral/Consult From: [facility] members   Place of [address] [place] [address] No   Continue Visiting (17)   Complexity of Encounter Low   Length of Encounter 30 minutes   Spiritual Assessment Completed Yes   Routine   Type Initial   Spiritual/Jainism   Type Spiritual support   Assessment Calm; Approachable;Passive; Anxious   Intervention Active listening;Explored feelings, thoughts, concerns;Nurtured hope;Prayer;Sustaining presence/ Ministry of presence   Outcome Expressed gratitude

## 2017-12-21 NOTE — H&P
Charcot-Melissa-Tooth peripheral neuropathy. When asked when her symptoms started she states last week however she's been dealing with this for quite some time. She has a neurologist, is on gabapentin Zanaflex and trazodone. \"      Past Medical History:     Past Medical History:   Diagnosis Date    Anxiety     Bipolar 1 disorder ([facility] [address]     CMT (Charcot-Melissa-Tooth disease)     Depression     Psychiatric problem         Past Surgical History:     Past Surgical History:   Procedure Laterality Date    BLADDER SURGERY      bladder stimulator    CHOLECYSTECTOMY      HYSTERECTOMY      KNEE ARTHROSCOPY      KNEE SURGERY Bilateral     NECK SURGERY  8/2012        Medications Prior to Admission:     Prior to Admission medications    Medication Sig Start Date End Date Taking? Authorizing Provider   clonazePAM (KLONOPIN) 1 MG tablet Take 1 mg by mouth 2 times daily as needed . Historical Provider, MD   aspirin-acetaminophen-caffeine (Skeet Getting) 195-586-06 MG per tablet Take 1 tablet by mouth every 6 hours as needed for Headaches    Historical Provider, MD   ibuprofen (ADVIL;MOTRIN) 800 MG tablet Take 800 mg by mouth every 6 hours as needed for Pain    Historical Provider, MD   NONFORMULARY     Historical Provider, MD   gabapentin (NEURONTIN) 300 MG capsule Take 300 mg by mouth 3 times daily    Historical Provider, MD   tiZANidine (ZANAFLEX) 4 MG tablet Take 4 mg by mouth every 6 hours as needed    Historical Provider, MD   promethazine (PHENERGAN) 25 MG tablet Take 25 mg by mouth 2 times daily as needed for Nausea    Historical Provider, MD   traZODone (DESYREL) 100 MG tablet Take 300 mg by mouth nightly     Historical Provider, MD        Allergies:     Latex; Azithromycin; Buspar [buspirone]; Compazine [prochlorperazine maleate]; Pcn [penicillins]; and Zofran [ondansetron hcl]    Social History:     Tobacco:    reports that she has never smoked.  She has never used smokeless tobacco.  Alcohol:      reports GFR African American >60 >60 mL/min    GFR Comment          GFR Staging NOT REPORTED    Comprehensive metabolic panel    Collection Time: 12/21/17  6:11 AM   Result Value Ref Range    Glucose 88 70 - 99 mg/dL    BUN 7 6 - 20 mg/dL    CREATININE 0.62 0.50 - 0.90 mg/dL    Bun/Cre Ratio NOT REPORTED 9 - 20    Calcium 8.3 (L) 8.6 - 10.4 mg/dL    Sodium 137 135 - 144 mmol/L    Potassium 3.8 3.7 - 5.3 mmol/L    Chloride 97 (L) 98 - 107 mmol/L    CO2 22 20 - 31 mmol/L    Anion Gap 18 (H) 9 - 17 mmol/L    Alkaline Phosphatase 103 35 - 104 U/L    ALT 23 5 - 33 U/L    AST 38 (H) <32 U/L    Total Bilirubin 0.55 0.3 - 1.2 mg/dL    Total Protein 6.8 6.4 - 8.3 g/dL    Alb 3.8 3.5 - 5.2 g/dL    Albumin/Globulin Ratio 1.3 1.0 - 2.5    GFR Non-African American >60 >60 mL/min    GFR African American >60 >60 mL/min    GFR Comment          GFR Staging NOT REPORTED    CBC    Collection Time: 12/21/17  6:11 AM   Result Value Ref Range    WBC 6.8 3.5 - 11.3 k/uL    RBC 4.36 3.95 - 5.11 m/uL    Hemoglobin 12.4 11.9 - 15.1 g/dL    Hematocrit 41.0 36.3 - 47.1 %    MCV 94.0 82.6 - 102.9 fL    MCH 28.4 25.2 - 33.5 pg    MCHC 30.2 28.4 - 34.8 g/dL    RDW 13.3 11.8 - 14.4 %    Platelets 087 130 - 588 k/uL    MPV 10.0 8.1 - 13.5 fL   Protime-INR    Collection Time: 12/21/17  6:11 AM   Result Value Ref Range    Protime 10.0 9.4 - 12.6 sec    INR 0.9        Imaging/Diagnostics:    EXAMINATION: CT OF THE HEAD WITHOUT CONTRAST; CT OF THE LUMBAR SPINE WITHOUT CONTRAST; CT OF THE CERVICAL SPINE WITHOUT CONTRAST  12/20/2017 12:03 pm    Impression 1. No intracranial hemorrhage or extra-axial fluid collection. 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 2. Postoperative appearance of cervical spine without CT evidence of acute trauma. 3. L4-L5 degenerative change of the lumbar spine but no fracture, compression deformity or subluxation.           Assessment :      Primary Problem  Status post fall    Active Hospital Problems    Diagnosis Date

## 2017-12-21 NOTE — PROGRESS NOTES
Occupational Therapy   Occupational Therapy Initial Assessment  Date: 2017   Patient Name: Jude Banuelos  MRN: [id number]     : 1975     Chief Complaint   Patient presents with   [facility] Fall       Pt reports that she has generalized body pain to her back and legs s/p fall, denies hitting head or LOC. Pt reports numbness and tingling to BLE x1 week    Generalized Body Aches    Numbness    Tingling           HISTORY OF PRESENT ILLNESS    [facility] Felecia is a 38 y.o. female who presents After a fall this morning. She was in her wheelchair trying to get into the bathroom and attempting to transfer. She fell on the floor in a supine position. There is questionable loss of consciousness. She has pain all over including her head, neck, back, arms and legs. She has chronic neuropathy with chronic numbness weakness paresthesias and weakness. She states she's been having more right sided weakness recently and now and also including her right upper extremity. She has severe headache with nausea photophobia. There is no change in vision. 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 When asked when her symptoms started she states last week however she's been dealing with this for quite some time. She has a neurologist, is on gabapentin Zanaflex and trazodone. Patient Diagnosis(es): The primary encounter diagnosis was Closed head injury, initial encounter. Diagnoses of Fall, initial encounter, Charcot-Melissa-Tooth disease, and General weakness were also pertinent to this visit. has a past medical history of Anxiety; Bipolar 1 disorder ([address] CMT (Charcot-Melissa-Tooth disease); Depression; and Psychiatric problem. has a past surgical history that includes Bladder surgery; Neck surgery (2012); knee surgery (Bilateral); Hysterectomy; Cholecystectomy; and Knee arthroscopy.     Restrictions  Restrictions/Precautions  Restrictions/Precautions: Fall Risk  Required Braces or

## 2017-12-21 NOTE — PROGRESS NOTES
NEUROLOGY INPATIENT PROGRESS NOTE    12/21/2017         Subjective: Emiliana Linares is a  38 y.o. female admitted on 12/20/2017 with Fall at home, initial encounter [W19. Rikki Brenner, Y92.939]. Chart reviewed. Discussed with patient/caregivers. Patient examined. No acute events overnight. No new motor, sensory, visual or bulbar symptoms. She reports feeling better today, stronger and \"back to normal.\" Headache is improved. Briefly, this is a  38 y.o. female with H/O anxiety, bipolar, CMT, depression, who was admitted on 12/20/2017 with fall. Per the records the patient was in her wheelchair trying to get into the bathroom and fell while attempting to transfer. Questionable LOC as patient cannot recall the event clearly. She has chronic neuropathy secondary to Charcot-Melissa-Tooth as well as chronic numbness and weakness with a recent increase in weakness to her RUE. She also admitted to severe headache with nausea and photophobia. She sees a neurologist, Dr. BONITA RO [facility] and takes Gabapentin, Zanaflex, and Trazodone. Patient has been hospitalized multiple times in the last 2 months for headache, BLE weakness, pain and falls. She does follow with pain management. CT head with no acute pathology, old lacunar infarct to right frontal cortex. CT cervical and lumbar spine without acute findings. No current facility-administered medications on file prior to encounter. Current Outpatient Prescriptions on File Prior to Encounter   Medication Sig Dispense Refill    clonazePAM (KLONOPIN) 1 MG tablet Take 1 mg by mouth 2 times daily as needed .       aspirin-acetaminophen-caffeine (EXCEDRIN MIGRAINE) 250-250-65 MG per tablet Take 1 tablet by mouth every 6 hours as needed for Headaches      ibuprofen (ADVIL;MOTRIN) 800 MG tablet Take 800 mg by mouth every 6 hours as needed for Pain      NONFORMULARY       gabapentin (NEURONTIN) 300 MG capsule Take 300 mg by mouth 3 times daily      tiZANidine (ZANAFLEX) 4 MG tablet Take no distress   HEENT  NC/ AT   NECK  Supple   MENTAL STATUS:  Alert, oriented, intact memory, no confusion, normal speech, normal language, no hallucination or delusion. Flat affect. CRANIAL NERVES: II     -      Visual fields intact to confrontation  III,IV,VI -  EOMs full, no afferent defect, no NATE, no ptosis  V     -     Normal facial sensation  VII    -     Normal facial symmetry  VIII   -     Intact hearing  IX,X -     Symmetrical palate  XI    -     Symmetrical shoulder shrug  XII   -     Midline tongue, no atrophy    MOTOR FUNCTION:  Strength of 4/5 throughout with giveway weakness to BUE but not at the same time, with normal bulk, normal tone and no involuntary movements, no tremor   SENSORY FUNCTION:  Normal touch, normal pin   CEREBELLAR FUNCTION:  Intact fine motor control over upper limbs   REFLEX FUNCTION:  Absent to BLE, hypoactive BUE, no perverted reflex, no Babinski sign   STATION and GAIT  Not tested       Data:    Lab Results:   CBC:   Recent Labs      12/20/17   1232  12/21/17   0611   WBC  6.9  6.8   HGB  12.8  12.4   PLT  299  335     BMP:    Recent Labs      12/20/17   1232  12/21/17   0611   NA  141  137   K  4.5  3.8   CL  102  97*   CO2  26  22   BUN  10  7   CREATININE  0.67  0.62   GLUCOSE  103*  88         Lab Results   Component Value Date    ALT 23 12/21/2017    AST 38 (H) 12/21/2017    INR 0.9 12/21/2017           Diagnostic data reviewed:  CT HEAD/CERVICAL/LUMBAR SPINE (12/20/17) -  1. No intracranial hemorrhage or extra-axial fluid collection.  Old lacunar   infarctions of the right frontal cortex. 2. Postoperative appearance of cervical spine without CT evidence of acute   trauma. 3. L4-L5 degenerative change of the lumbar spine but no fracture, compression   deformity or subluxation. Impression and Plan: Ms. Emiliana Linares is a 38 y.o. female with chronic bilateral lower greater than upper extremity limb pain. Fall.      Her exam reveals giveway weakness, patient reports having improvement today and feeling almost back to her baseline. Recommended to patient that she follow up with her neurologist upon discharge for continued management of her chronic conditions but that she has no obvious neurologic deficit that needs further work up in the hospital setting at this time. Continue home doses of Gabapentin, Trazodone, Zanaflex    PT/OT eval    Neurologically clear. Will follow with you while she is here.

## 2017-12-21 NOTE — CARE COORDINATION
Patient admitted to inpatient status. Review of case indicates appropriate status should be observation. CC44 completed. Dr. Sherif Woodruff, attending physician, was noticed and agreed to observation status. order placed for observation service.      Sujatha Anthony Ace, MD, [facility]

## 2017-12-22 NOTE — DISCHARGE SUMMARY
Krista Neal 21    Discharge Summary     Patient ID: Dee Luu  :  1975   MRN: [id number]     ACCOUNT:  [de-identified]   Patient's PCP: Lorna Cole DO  Admit Date: 2017   Discharge Date: 17    Length of Stay: 1  Code Status:  Prior  Admitting Physician: Linus Grider MD  Discharge Physician: Linus Grider MD     Active Discharge Diagnoses:     Primary Problem  Status post fall      [place] Problems    Diagnosis Date Noted    Status post fall [Z91.81] 2017    Fall at home, initial encounter [T23. 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 female who was admitted for the management of   Status post fall , presented to ER with Fall (Pt reports that she has generalized body pain to her back and legs s/p fall, denies hitting head or LOC. Pt reports numbness and tingling to BLE x1 week); Generalized Body Aches; Numbness; and Tingling    The patient is a 38 y.o. Non-/non  female who presents with Fall (Pt reports that she has generalized body pain to her back and legs s/p fall, denies hitting head or LOC. Pt reports numbness and tingling to BLE x1 week);  Generalized Body Aches; Numbness; and Tingling   and she is admitted to the hospital for the management of  Fall.      She has the following significant comorbidities: Charcot-Melissa-Tooth disease, chronic peripheral neuropathy, history of degenerative disc disease, wheelchair bound, history of depression, bipolar disease, and complex migraines.     She presents with:     \"a fall this morning.  She was in her wheelchair trying to get into the bathroom and attempting to transfer.  She fell on the floor in a supine position. Cyndi Wilcox is questionable loss of consciousness.  She has pain all over including her head, neck, back, arms and legs.  She has chronic neuropathy with chronic numbness weakness paresthesias and weakness.  She states she's been having more right sided weakness recently and now and also including her right upper extremity.  She has severe headache with nausea photophobia. Cyndi Wilcox is no change in vision. 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  When asked when her symptoms started she states last week however she's been dealing with this for quite some time. Vicky Chakraborty has a neurologist, is on gabapentin Zanaflex and trazodone. \"      Significant therapeutic interventions:     S/P Fall in a patient with a h/o Charcot-Melissa-Tooth peripheral neuropathy. Trauma work-up negative. Neurology on board. Per Neurology, stable and clear. Okay for discharge with follow up with her primary neurologist.      Acute on chronic debilitation. It appears patient is not able to manage her ADLs at home and will need a high level of care that would not be able to provided at her home. PT OT on board. May need SNF referral vs home health vs Acute rehab.      DVT PPx.  SQ Lovenox    Significant Diagnostic Studies:   Labs / Micro:  CBC:   Lab Results   Component Value Date    WBC 6.8 12/21/2017    RBC 4.36 12/21/2017    HGB 12.4 12/21/2017    HCT 41.0 12/21/2017    MCV 94.0 12/21/2017    MCH 28.4 12/21/2017    MCHC 30.2 12/21/2017    RDW 13.3 12/21/2017     12/21/2017     BMP:    Lab Results   Component Value Date    GLUCOSE 88 12/21/2017     12/21/2017    K 3.8 12/21/2017    CL 97 12/21/2017    CO2 22 12/21/2017    ANIONGAP 18 12/21/2017    BUN 7 12/21/2017    CREATININE 0.62 12/21/2017    BUNCRER NOT REPORTED 12/21/2017    CALCIUM 8.3 12/21/2017    LABGLOM >60 12/21/2017    GFRAA >60 12/21/2017    GFR      12/21/2017 GFR NOT REPORTED 12/21/2017     HFP:    Lab Results   Component Value Date    PROT 6.8 12/21/2017     CMP:    Lab Results   Component Value Date    GLUCOSE 88 12/21/2017     12/21/2017    K 3.8 12/21/2017    CL 97 12/21/2017    CO2 22 12/21/2017    BUN 7 12/21/2017    CREATININE 0.62 12/21/2017    ANIONGAP 18 12/21/2017    ALKPHOS 103 12/21/2017    ALT 23 12/21/2017    AST 38 12/21/2017    BILITOT 0.55 12/21/2017    LABALBU 3.8 12/21/2017    ALBUMIN 1.3 12/21/2017    LABGLOM >60 12/21/2017    GFRAA >60 12/21/2017    GFR      12/21/2017    GFR NOT REPORTED 12/21/2017    PROT 6.8 12/21/2017    CALCIUM 8.3 12/21/2017     PT/INR:  No results found for: PTINR  PTT:   Lab Results   Component Value Date    APTT 28.1 01/17/2013     FLP:  No results found for: CHOL, TRIG, HDL  U/A:    Lab Results   Component Value Date    COLORU YELLOW 11/16/2017    TURBIDITY SLIGHTLY CLOUDY 11/16/2017    SPECGRAV 1.010 11/16/2017    HGBUR TRACE 11/16/2017    PHUR 5.0 11/16/2017    PROTEINU NEGATIVE 11/16/2017    GLUCOSEU NEGATIVE 11/16/2017    KETUA NEGATIVE 11/16/2017    BILIRUBINUR NEGATIVE 11/16/2017    UROBILINOGEN Normal 11/16/2017    NITRU NEGATIVE 11/16/2017    LEUKOCYTESUR NEGATIVE 11/16/2017     Radiology:    EXAMINATION: CT OF THE HEAD WITHOUT CONTRAST; CT OF THE LUMBAR SPINE WITHOUT CONTRAST; CT OF THE CERVICAL SPINE WITHOUT CONTRAST  12/20/2017 12:03 pm    Impression 1. No intracranial hemorrhage or extra-axial fluid collection.  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 2. Postoperative appearance of cervical spine without CT evidence of acute trauma. 3.  L4-L5 degenerative change of the lumbar spine but no fracture, compression deformity or subluxation.        Consultations:    Consults:     Final Specialist Recommendations/Findings:   IP CONSULT TO PRIMARY CARE PROVIDER  IP CONSULT TO NEUROLOGY  IP CONSULT TO SOCIAL WORK  IP CONSULT TO HOSPITALIST  IP CONSULT TO PHYSICAL MEDICINE REHAB  IP CONSULT TO HOME

## 2017-12-28 ENCOUNTER — HOSPITAL ENCOUNTER (OUTPATIENT)
Dept: INTERVENTIONAL RADIOLOGY/VASCULAR | Age: 42
Discharge: HOME | End: 2017-12-28
Payer: MEDICARE

## 2017-12-28 ENCOUNTER — HOSPITAL ENCOUNTER (OUTPATIENT)
Dept: CT IMAGING | Age: 42
Discharge: HOME | End: 2017-12-28
Payer: MEDICARE

## 2017-12-28 VITALS
HEIGHT: 62 IN | OXYGEN SATURATION: 98 % | SYSTOLIC BLOOD PRESSURE: 164 MMHG | DIASTOLIC BLOOD PRESSURE: 88 MMHG | HEART RATE: 80 BPM | BODY MASS INDEX: 41.41 KG/M2 | WEIGHT: 225 LBS | RESPIRATION RATE: 18 BRPM | TEMPERATURE: 97 F

## 2017-12-28 DIAGNOSIS — M54.12 CERVICAL RADICULOPATHY: ICD-10-CM

## 2017-12-28 DIAGNOSIS — M54.17 LUMBOSACRAL RADICULOPATHY: ICD-10-CM

## 2017-12-28 LAB
INR BLD: 0.9
PARTIAL THROMBOPLASTIN TIME: 25.9 SEC (ref 23–31)
PLATELET # BLD: 310 K/UL (ref 150–450)
PROTHROMBIN TIME: 9.6 SEC (ref 9.7–12)

## 2017-12-28 PROCEDURE — 72132 CT LUMBAR SPINE W/DYE: CPT

## 2017-12-28 PROCEDURE — 85610 PROTHROMBIN TIME: CPT

## 2017-12-28 PROCEDURE — 85049 AUTOMATED PLATELET COUNT: CPT

## 2017-12-28 PROCEDURE — 72129 CT CHEST SPINE W/DYE: CPT

## 2017-12-28 PROCEDURE — 6360000004 HC RX CONTRAST MEDICATION: Performed by: PAIN MEDICINE

## 2017-12-28 PROCEDURE — 62305 MYELOGRAPHY LUMBAR INJECTION: CPT

## 2017-12-28 PROCEDURE — 72126 CT NECK SPINE W/DYE: CPT

## 2017-12-28 PROCEDURE — 36415 COLL VENOUS BLD VENIPUNCTURE: CPT

## 2017-12-28 PROCEDURE — 7100000031 HC ASPR PHASE II RECOVERY - ADDTL 15 MIN

## 2017-12-28 PROCEDURE — 7100000030 HC ASPR PHASE II RECOVERY - FIRST 15 MIN

## 2017-12-28 PROCEDURE — 85730 THROMBOPLASTIN TIME PARTIAL: CPT

## 2017-12-28 PROCEDURE — 6370000000 HC RX 637 (ALT 250 FOR IP): Performed by: RADIOLOGY

## 2017-12-28 RX ORDER — SODIUM CHLORIDE 0.9 % (FLUSH) 0.9 %
10 SYRINGE (ML) INJECTION PRN
Status: DISCONTINUED | OUTPATIENT
Start: 2017-12-28 | End: 2017-12-31 | Stop reason: HOSPADM

## 2017-12-28 RX ORDER — ACETAMINOPHEN 325 MG/1
650 TABLET ORAL EVERY 4 HOURS PRN
Status: DISCONTINUED | OUTPATIENT
Start: 2017-12-28 | End: 2017-12-31 | Stop reason: HOSPADM

## 2017-12-28 RX ORDER — SODIUM CHLORIDE 9 MG/ML
INJECTION, SOLUTION INTRAVENOUS CONTINUOUS
Status: DISCONTINUED | OUTPATIENT
Start: 2017-12-28 | End: 2017-12-31 | Stop reason: HOSPADM

## 2017-12-28 RX ADMIN — IOPAMIDOL 15 ML: 612 INJECTION, SOLUTION INTRATHECAL at 09:46

## 2017-12-28 RX ADMIN — ACETAMINOPHEN 650 MG: 325 TABLET, FILM COATED ORAL at 10:15

## 2017-12-28 ASSESSMENT — PAIN DESCRIPTION - LOCATION
LOCATION: BACK;NECK
LOCATION: BACK;NECK

## 2017-12-28 ASSESSMENT — PAIN DESCRIPTION - DESCRIPTORS
DESCRIPTORS: CONSTANT;SHARP
DESCRIPTORS: CONSTANT;SHARP

## 2017-12-28 ASSESSMENT — PAIN - FUNCTIONAL ASSESSMENT: PAIN_FUNCTIONAL_ASSESSMENT: 0-10

## 2017-12-28 ASSESSMENT — PAIN DESCRIPTION - PAIN TYPE
TYPE: CHRONIC PAIN
TYPE: CHRONIC PAIN

## 2017-12-28 ASSESSMENT — PAIN SCALES - GENERAL
PAINLEVEL_OUTOF10: 5
PAINLEVEL_OUTOF10: 6
PAINLEVEL_OUTOF10: 6

## 2017-12-28 NOTE — H&P
LUNGS:  Equal on expansion, normal breath sounds. No adventitious sounds. ABDOMEN:  Obese. Soft on palpation. No localized tenderness. No guarding or rigidity. No palpable organomegaly. LYMPHATICS:  No palpable cervical lymphadenopathy. LOCOMOTOR, BACK AND SPINE:  No tenderness or deformities. Tenderness in the  neck and right leg. EXTREMITIES:  Symmetrical, no pedal edema. Leonies sign negative. No discoloration or ulcerations. NEUROLOGIC:  The patient is conscious, alert, oriented, No apparent focal sensory or motor deficits. dayami foot with curvature downwards to simulate drop foot. Weakness in right hand grasp. MS in right leg 3/5.                    PROVISIONAL DIAGNOSES / SURGERY:        Cervical radiculopathy   Lumbosacral radiculopathy  IR MYELOGRAM    Patient Active Problem List    Diagnosis Date Noted    Status post fall 12/21/2017    Fall at home, initial encounter 12/21/2017    Charcot-Melissa-Tooth disease     General weakness     Closed head injury     Bilateral leg weakness 11/17/2017    Intractable common migraine 09/08/2017    Intractable migraine 07/30/2017    Headache 05/31/2017    Depression 05/18/2017    B[facility] 05/16/2017    Suicidal ideation 04/11/2017           YAHIR Garcia CNP on 12/28/2017 at 8:35 AM

## 2018-01-06 ENCOUNTER — HOSPITAL ENCOUNTER (EMERGENCY)
Age: 43
Discharge: HOME | End: 2018-01-06
Attending: EMERGENCY MEDICINE
Payer: MEDICARE

## 2018-01-06 VITALS
HEART RATE: 79 BPM | TEMPERATURE: 96.1 F | SYSTOLIC BLOOD PRESSURE: 150 MMHG | RESPIRATION RATE: 16 BRPM | OXYGEN SATURATION: 99 % | DIASTOLIC BLOOD PRESSURE: 102 MMHG

## 2018-01-06 DIAGNOSIS — G62.9 NEUROPATHY: Primary | ICD-10-CM

## 2018-01-06 LAB
ABSOLUTE EOS #: 0.06 K/UL (ref 0–0.44)
ABSOLUTE IMMATURE GRANULOCYTE: <0.03 K/UL (ref 0–0.3)
ABSOLUTE LYMPH #: 1.46 K/UL (ref 1.1–3.7)
ABSOLUTE MONO #: 0.36 K/UL (ref 0.1–1.2)
ALBUMIN SERPL-MCNC: 4.5 G/DL (ref 3.5–5.2)
ALBUMIN/GLOBULIN RATIO: 1.5 (ref 1–2.5)
ALP BLD-CCNC: 83 U/L (ref 35–104)
ALT SERPL-CCNC: 16 U/L (ref 5–33)
ANION GAP SERPL CALCULATED.3IONS-SCNC: 17 MMOL/L (ref 9–17)
AST SERPL-CCNC: 17 U/L
BASOPHILS # BLD: 1 % (ref 0–2)
BASOPHILS ABSOLUTE: 0.04 K/UL (ref 0–0.2)
BILIRUB SERPL-MCNC: 0.24 MG/DL (ref 0.3–1.2)
BILIRUBIN DIRECT: <0.08 MG/DL
BILIRUBIN, INDIRECT: ABNORMAL MG/DL (ref 0–1)
BUN BLDV-MCNC: 8 MG/DL (ref 6–20)
BUN/CREAT BLD: NORMAL (ref 9–20)
CALCIUM SERPL-MCNC: 9.1 MG/DL (ref 8.6–10.4)
CHLORIDE BLD-SCNC: 103 MMOL/L (ref 98–107)
CO2: 21 MMOL/L (ref 20–31)
CREAT SERPL-MCNC: 0.63 MG/DL (ref 0.5–0.9)
DIFFERENTIAL TYPE: NORMAL
EOSINOPHILS RELATIVE PERCENT: 1 % (ref 1–4)
GFR AFRICAN AMERICAN: >60 ML/MIN
GFR NON-AFRICAN AMERICAN: >60 ML/MIN
GFR SERPL CREATININE-BSD FRML MDRD: NORMAL ML/MIN/{1.73_M2}
GFR SERPL CREATININE-BSD FRML MDRD: NORMAL ML/MIN/{1.73_M2}
GLOBULIN: ABNORMAL G/DL (ref 1.5–3.8)
GLUCOSE BLD-MCNC: 96 MG/DL (ref 70–99)
HCG QUALITATIVE: NEGATIVE
HCT VFR BLD CALC: 45.7 % (ref 36.3–47.1)
HEMOGLOBIN: 13.6 G/DL (ref 11.9–15.1)
IMMATURE GRANULOCYTES: 0 %
LIPASE: 31 U/L (ref 13–60)
LYMPHOCYTES # BLD: 28 % (ref 24–43)
MCH RBC QN AUTO: 28.5 PG (ref 25.2–33.5)
MCHC RBC AUTO-ENTMCNC: 29.8 G/DL (ref 28.4–34.8)
MCV RBC AUTO: 95.8 FL (ref 82.6–102.9)
MONOCYTES # BLD: 7 % (ref 3–12)
PDW BLD-RTO: 12.5 % (ref 11.8–14.4)
PLATELET # BLD: 262 K/UL (ref 138–453)
PLATELET ESTIMATE: NORMAL
PMV BLD AUTO: 9.5 FL (ref 8.1–13.5)
POTASSIUM SERPL-SCNC: 3.8 MMOL/L (ref 3.7–5.3)
RBC # BLD: 4.77 M/UL (ref 3.95–5.11)
RBC # BLD: NORMAL 10*6/UL
SEG NEUTROPHILS: 63 % (ref 36–65)
SEGMENTED NEUTROPHILS ABSOLUTE COUNT: 3.37 K/UL (ref 1.5–8.1)
SODIUM BLD-SCNC: 141 MMOL/L (ref 135–144)
TOTAL PROTEIN: 7.5 G/DL (ref 6.4–8.3)
WBC # BLD: 5.3 K/UL (ref 3.5–11.3)
WBC # BLD: NORMAL 10*3/UL

## 2018-01-06 PROCEDURE — 83690 ASSAY OF LIPASE: CPT

## 2018-01-06 PROCEDURE — 6360000002 HC RX W HCPCS: Performed by: EMERGENCY MEDICINE

## 2018-01-06 PROCEDURE — 96374 THER/PROPH/DIAG INJ IV PUSH: CPT

## 2018-01-06 PROCEDURE — 2580000003 HC RX 258: Performed by: EMERGENCY MEDICINE

## 2018-01-06 PROCEDURE — 84703 CHORIONIC GONADOTROPIN ASSAY: CPT

## 2018-01-06 PROCEDURE — 80048 BASIC METABOLIC PNL TOTAL CA: CPT

## 2018-01-06 PROCEDURE — 96375 TX/PRO/DX INJ NEW DRUG ADDON: CPT

## 2018-01-06 PROCEDURE — 99283 EMERGENCY DEPT VISIT LOW MDM: CPT

## 2018-01-06 PROCEDURE — 80076 HEPATIC FUNCTION PANEL: CPT

## 2018-01-06 PROCEDURE — 85025 COMPLETE CBC W/AUTO DIFF WBC: CPT

## 2018-01-06 RX ORDER — FENTANYL CITRATE 50 UG/ML
25 INJECTION, SOLUTION INTRAMUSCULAR; INTRAVENOUS ONCE
Status: COMPLETED | OUTPATIENT
Start: 2018-01-06 | End: 2018-01-06

## 2018-01-06 RX ORDER — METOCLOPRAMIDE HYDROCHLORIDE 5 MG/ML
10 INJECTION INTRAMUSCULAR; INTRAVENOUS ONCE
Status: COMPLETED | OUTPATIENT
Start: 2018-01-06 | End: 2018-01-06

## 2018-01-06 RX ORDER — 0.9 % SODIUM CHLORIDE 0.9 %
1000 INTRAVENOUS SOLUTION INTRAVENOUS ONCE
Status: COMPLETED | OUTPATIENT
Start: 2018-01-06 | End: 2018-01-06

## 2018-01-06 RX ADMIN — METOCLOPRAMIDE 10 MG: 5 INJECTION, SOLUTION INTRAMUSCULAR; INTRAVENOUS at 11:30

## 2018-01-06 RX ADMIN — SODIUM CHLORIDE 1000 ML: 9 INJECTION, SOLUTION INTRAVENOUS at 11:30

## 2018-01-06 RX ADMIN — FENTANYL CITRATE 25 MCG: 50 INJECTION INTRAMUSCULAR; INTRAVENOUS at 11:30

## 2018-01-06 ASSESSMENT — ENCOUNTER SYMPTOMS
GASTROINTESTINAL NEGATIVE: 1
EYES NEGATIVE: 1
RESPIRATORY NEGATIVE: 1
ALLERGIC/IMMUNOLOGIC NEGATIVE: 1

## 2018-01-06 ASSESSMENT — PAIN SCALES - GENERAL: PAINLEVEL_OUTOF10: 8

## 2018-01-06 NOTE — ED PROVIDER NOTES
education: N/A     Occupational History    Not on file. Social History Main Topics    Smoking status: Never Smoker    Smokeless tobacco: Never Used    Alcohol use No    Drug use: No    Sexual activity: Not on file     Other Topics Concern    Not on file     Social History Narrative    No narrative on file       Family History   Problem Relation Age of Onset    Parkinsonism Other      great grandmother       Allergies:  Latex; Azithromycin; Buspar [buspirone]; Compazine [prochlorperazine maleate]; Pcn [penicillins]; and Zofran [ondansetron hcl]    Home Medications:  Prior to Admission medications    Medication Sig Start Date End Date Taking? Authorizing Provider   gabapentin (NEURONTIN) 300 MG capsule Take 300 mg by mouth 3 times daily   Yes Historical Provider, MD   tiZANidine (ZANAFLEX) 4 MG tablet Take 4 mg by mouth every 6 hours as needed   Yes Historical Provider, MD   promethazine (PHENERGAN) 25 MG tablet Take 25 mg by mouth 2 times daily as needed for Nausea   Yes Historical Provider, MD   traZODone (DESYREL) 100 MG tablet Take 300 mg by mouth nightly    Yes Historical Provider, MD   clonazePAM (KLONOPIN) 1 MG tablet Take 1 mg by mouth 2 times daily as needed . Historical Provider, MD   aspirin-acetaminophen-caffeine (Marcina Rolly) [phone] MG per tablet Take 1 tablet by mouth every 6 hours as needed for Headaches    Historical Provider, MD   ibuprofen (ADVIL;MOTRIN) 800 MG tablet Take 800 mg by mouth every 6 hours as needed for Pain    Historical Provider, MD   NONFORMULARY     Historical Provider, MD       REVIEW OF SYSTEMS    (2-9 systems for level 4, 10 or more for level 5)      Review of Systems   HENT: Negative. Eyes: Negative. Respiratory: Negative. Cardiovascular: Negative. Gastrointestinal: Negative. Endocrine: Negative. Genitourinary: Negative. Musculoskeletal: Negative. Skin: Negative. Allergic/Immunologic: Negative.     Neurological: Positive for 60 U/L   HCG Qualitative, Serum   Result Value Ref Range    hCG Qual NEGATIVE NEG       IMPRESSION: 30-year-old female patient with a chronic history of bilateral lower extremity numbness, tingling, back pain, bilateral upper extremity numbness, tingling present to the ED with complains of acute exacerbation of her chronic symptoms. Patient had a myelogram on the 20th of this month that was negative for any acute findings, CT scan of abdomen was also unremarkable. Patient is unable to get an MRI done because she has had a similar to her. We will put a call through to her pain medicine doctor for further recommendations. Patient also mentioned to the attending physician that \"my pancrease is acting up\". we will obtain abdominal labs, started on IV fluids, give something for pain and nausea as he await recommendations from her pain specialist.      EMERGENCY DEPARTMENT COURSE:  12:13 PM   I spoke to Dr. Leobardo Austin  He states that if previous imaging studies are unremarkable and patient doesn't have any new symptoms, she can be discharged and instructions to follow-up at his office during the week. Patient was informed of this discussion, instructed to follow up with him and verbalized understanding. FINAL IMPRESSION      1.  Neuropathy [facility])          DISPOSITION / PLAN     DISPOSITION        PATIENT REFERRED TO:  Mary Mcknight DO  [address]  [phone]    Schedule an appointment as soon as possible for a visit   As needed, If symptoms worsen    [facility]  [address] [facility]  [phone]  Go to   As needed, If symptoms worsen    Keren Leon MD  [place] [address]  [phone]    Go to   For Follow up appointment      DISCHARGE MEDICATIONS:  Discharge Medication List as of 1/6/2018  1:24 PM          Reno Deleon MD  Emergency Medicine Resident    (Please note that portions of this note were completed with a voice

## 2018-01-06 NOTE — ED NOTES
After resident walked into room and writer was exiting pt reported Lower back pain also. Did not make any mention of this prior to resident.      Jade Rowan RN  01/06/18 1014

## 2018-01-06 NOTE — ED PROVIDER NOTES
[facility] [address] ED     Emergency Department     Faculty Attestation    I performed a history and physical examination of the patient and discussed management with the resident. I reviewed the residents note and agree with the documented findings and plan of care. Any areas of disagreement are noted on the chart. I was personally present for the key portions of any procedures. I have documented in the chart those procedures where I was not present during the key portions. I have reviewed the emergency nurses triage note. I agree with the chief complaint, past medical history, past surgical history, allergies, medications, social and family history as documented unless otherwise noted below. For Physician Assistant/ Nurse Practitioner cases/documentation I have personally evaluated this patient and have completed at least one if not all key elements of the E/M (history, physical exam, and MDM). Additional findings are as noted. Patient presents with chronic bilateral leg pain that she has had for the past couple of months. Patient says she was seeing a pain specialist.  She says she had a myelogram done for the pain and since then her pain has been worsening. She denies any fall or new injury. She denies any new weakness or tingling. She denies any difficulty going to the bathroom. She says that she did have an appointment with her new pain specialist in the next week but that was canceled and nauseous with another couple of weeks. Patient states that she has not been taking anything for pain but then when I asked her she's been taking anything over-the-counter she told me that she has been taking Advil. As I was about to walk out of the door after finishing my initial exam, patient stated that she also thinks that her pancreatitis is flaring up. She denies fever, chills, chest pain, shortness of breath. She says she does have nausea. On exam, patient is resting completely in the bed.   Lungs

## 2018-01-18 ENCOUNTER — HOSPITAL ENCOUNTER (OUTPATIENT)
Dept: PAIN MANAGEMENT | Age: 43
Discharge: HOME | End: 2018-01-18
Payer: COMMERCIAL

## 2018-01-18 VITALS
SYSTOLIC BLOOD PRESSURE: 133 MMHG | TEMPERATURE: 97.4 F | RESPIRATION RATE: 18 BRPM | DIASTOLIC BLOOD PRESSURE: 90 MMHG | HEART RATE: 79 BPM

## 2018-01-18 PROCEDURE — 99214 OFFICE O/P EST MOD 30 MIN: CPT

## 2018-01-18 PROCEDURE — 99203 OFFICE O/P NEW LOW 30 MIN: CPT

## 2018-01-18 PROCEDURE — 99214 OFFICE O/P EST MOD 30 MIN: CPT | Performed by: PAIN MEDICINE

## 2018-01-18 ASSESSMENT — ENCOUNTER SYMPTOMS
ABDOMINAL PAIN: 0
BACK PAIN: 1
BOWEL INCONTINENCE: 0
SLEEP DISTURBANCES DUE TO BREATHING: 1

## 2018-01-26 ENCOUNTER — HOSPITAL ENCOUNTER (EMERGENCY)
Age: 43
Discharge: HOME | End: 2018-01-26
Attending: SPECIALIST
Payer: MEDICARE

## 2018-01-26 VITALS
SYSTOLIC BLOOD PRESSURE: 155 MMHG | TEMPERATURE: 98.4 F | HEIGHT: 62 IN | RESPIRATION RATE: 18 BRPM | DIASTOLIC BLOOD PRESSURE: 89 MMHG | HEART RATE: 82 BPM | WEIGHT: 211 LBS | OXYGEN SATURATION: 96 % | BODY MASS INDEX: 38.83 KG/M2

## 2018-01-26 DIAGNOSIS — G43.009 MIGRAINE WITHOUT AURA AND WITHOUT STATUS MIGRAINOSUS, NOT INTRACTABLE: Primary | ICD-10-CM

## 2018-01-26 PROCEDURE — 6360000002 HC RX W HCPCS: Performed by: EMERGENCY MEDICINE

## 2018-01-26 PROCEDURE — 96376 TX/PRO/DX INJ SAME DRUG ADON: CPT

## 2018-01-26 PROCEDURE — 96375 TX/PRO/DX INJ NEW DRUG ADDON: CPT

## 2018-01-26 PROCEDURE — 96374 THER/PROPH/DIAG INJ IV PUSH: CPT

## 2018-01-26 PROCEDURE — 2580000003 HC RX 258: Performed by: EMERGENCY MEDICINE

## 2018-01-26 PROCEDURE — 99283 EMERGENCY DEPT VISIT LOW MDM: CPT

## 2018-01-26 RX ORDER — DEXAMETHASONE SODIUM PHOSPHATE 10 MG/ML
10 INJECTION INTRAMUSCULAR; INTRAVENOUS ONCE
Status: COMPLETED | OUTPATIENT
Start: 2018-01-26 | End: 2018-01-26

## 2018-01-26 RX ORDER — DIPHENHYDRAMINE HYDROCHLORIDE 50 MG/ML
25 INJECTION INTRAMUSCULAR; INTRAVENOUS ONCE
Status: COMPLETED | OUTPATIENT
Start: 2018-01-26 | End: 2018-01-26

## 2018-01-26 RX ORDER — 0.9 % SODIUM CHLORIDE 0.9 %
1000 INTRAVENOUS SOLUTION INTRAVENOUS ONCE
Status: COMPLETED | OUTPATIENT
Start: 2018-01-26 | End: 2018-01-26

## 2018-01-26 RX ORDER — PROMETHAZINE HYDROCHLORIDE 25 MG/ML
12.5 INJECTION, SOLUTION INTRAMUSCULAR; INTRAVENOUS ONCE
Status: COMPLETED | OUTPATIENT
Start: 2018-01-26 | End: 2018-01-26

## 2018-01-26 RX ORDER — KETOROLAC TROMETHAMINE 30 MG/ML
30 INJECTION, SOLUTION INTRAMUSCULAR; INTRAVENOUS ONCE
Status: COMPLETED | OUTPATIENT
Start: 2018-01-26 | End: 2018-01-26

## 2018-01-26 RX ADMIN — DIPHENHYDRAMINE HYDROCHLORIDE 25 MG: 50 INJECTION, SOLUTION INTRAMUSCULAR; INTRAVENOUS at 07:27

## 2018-01-26 RX ADMIN — KETOROLAC TROMETHAMINE 30 MG: 30 INJECTION, SOLUTION INTRAMUSCULAR at 07:29

## 2018-01-26 RX ADMIN — SODIUM CHLORIDE 1000 ML: 9 INJECTION, SOLUTION INTRAVENOUS at 07:27

## 2018-01-26 RX ADMIN — DIPHENHYDRAMINE HYDROCHLORIDE 25 MG: 50 INJECTION, SOLUTION INTRAMUSCULAR; INTRAVENOUS at 08:17

## 2018-01-26 RX ADMIN — PROMETHAZINE HYDROCHLORIDE 12.5 MG: 25 INJECTION INTRAMUSCULAR; INTRAVENOUS at 08:17

## 2018-01-26 RX ADMIN — PROMETHAZINE HYDROCHLORIDE 12.5 MG: 25 INJECTION INTRAMUSCULAR; INTRAVENOUS at 07:27

## 2018-01-26 RX ADMIN — DEXAMETHASONE SODIUM PHOSPHATE 10 MG: 10 INJECTION INTRAMUSCULAR; INTRAVENOUS at 07:27

## 2018-01-26 ASSESSMENT — PAIN DESCRIPTION - PROGRESSION: CLINICAL_PROGRESSION: GRADUALLY IMPROVING

## 2018-01-26 ASSESSMENT — PAIN DESCRIPTION - PAIN TYPE: TYPE: ACUTE PAIN

## 2018-01-26 ASSESSMENT — PAIN DESCRIPTION - DESCRIPTORS: DESCRIPTORS: HEADACHE

## 2018-01-26 ASSESSMENT — PAIN DESCRIPTION - FREQUENCY: FREQUENCY: CONTINUOUS

## 2018-01-26 ASSESSMENT — PAIN DESCRIPTION - LOCATION: LOCATION: HEAD

## 2018-01-26 ASSESSMENT — PAIN SCALES - GENERAL
PAINLEVEL_OUTOF10: 10
PAINLEVEL_OUTOF10: 7
PAINLEVEL_OUTOF10: 10

## 2018-01-26 ASSESSMENT — PAIN DESCRIPTION - ORIENTATION: ORIENTATION: LEFT

## 2018-01-26 NOTE — ED NOTES
Pt. To ED with c/o severe migraine that started last night. Patient appears panicked, shallow, rapid respirations, eyes closed, not talking in full sentences. H/o anxiety. Was seen at [facility] ED yesterday for same. Given Benedryl and Toradol IM, not effective. Here with . Reports pain on left side of head and that it feels like a \"typical migraine\" for her but just won't go away.      Indu Lawson RN  01/26/18 9376

## 2018-01-26 NOTE — ED PROVIDER NOTES
are benign bilaterally  Ears, nose, and throat: Oropharynx clear  Neck: No masses, trachea midline, and no thyromegaly. Respiratory: Clear to auscultation, full aeration throughout all fields. Cardiovascular: No murmurs, heart sounds normal, no thrills. Gastrointestinal: No masses, no hepatosplenomegaly, bowel sounds positive. No tenderness. Skin: No rashes or lesions on exposed surfaces, good skin turgor. Neurological: Deep tendon reflexes 2+, sensation intact bilaterally, no focal neurological findings. Extremities: Good range of motion, no edema. Moderately dehydrated with dry mucous membranes  No meningeal signs and appears nontoxic    DIFFERENTIAL DIAGNOSIS/ MEDICAL DECISION MAKING:     IV fluids and Benadryl Phenergan and Toradol and Decadron IV with improvement    Ambulatory without deficit    Follow Exit Care instructions closely. I have reviewed the disposition diagnosis with the patient and or their family/guardian. I have answered their questions and given discharge instructions. They voiced understanding of these instructions and did not have any further questions or complaints. DIAGNOSTIC RESULTS     RADIOLOGY:   Non-plain film images such as CT, Ultrasound and MRI are read by the radiologist. Plain radiographic images are visualized and preliminarily interpreted by the emergency physician with the below findings:  No orders to display       LABS:  No results found for this visit on 01/26/18. ABNORMAL LABS:  Labs Reviewed - No data to display     EKG:  All EKG's are interpreted by the Emergency Department Physician who either signs or Co-signs this chart in the absence of a cardiologist.        EMERGENCY DEPARTMENT COURSE:   Vitals:    Vitals:    01/26/18 0646   BP: (!) 161/114   Pulse: 107   Resp: 24   Temp: 98.4 °F (36.9 °C)   TempSrc: Oral   SpO2: 96%   Weight: 95.7 kg (211 lb)   Height: 5' 2\" (1.575 m)     -------------------------  BP: (!) 161/114, Temp: 98.4 °F (36.9 °C), Pulse: 107, Resp: 24    See DDX/MDM (Differential Diagnosis/Medical Decision Making) above      FINAL IMPRESSION      1.  Migraine without aura and without status migrainosus, not intractable          DISPOSITION/PLAN   DISPOSITION Decision To Discharge 01/26/2018 08:09:41 AM      Condition on Disposition    Fair    PATIENT REFERRED TO:  DO Donna Mullen [address] [facility] [address]  [address]  [phone]    In 3 days        DISCHARGE MEDICATIONS:  New Prescriptions    No medications on file       (Please note that portions of this note were completed with a voice recognition program.  Efforts were made to edit the dictations but occasionally words are mis-transcribed.)    Darren Hayes,DO  Attending Emergency Physician       [address] [place] [address], DO  01/26/18 4723

## 2018-01-26 NOTE — ED NOTES
Pt resting on cart, lights are off. She is easily awakened when RN enters room. Pt states that she is feeling \"a little better\". She has no requests or complaints at this time.  Call light within reach     Archie Bo, GRISELDA  01/26/18 4932

## 2018-03-22 ENCOUNTER — HOSPITAL ENCOUNTER (EMERGENCY)
Age: 43
Discharge: HOME | End: 2018-03-22
Attending: EMERGENCY MEDICINE
Payer: MEDICARE

## 2018-03-22 ENCOUNTER — APPOINTMENT (OUTPATIENT)
Dept: CT IMAGING | Age: 43
End: 2018-03-22
Payer: MEDICARE

## 2018-03-22 VITALS
HEIGHT: 62 IN | TEMPERATURE: 98.4 F | DIASTOLIC BLOOD PRESSURE: 99 MMHG | BODY MASS INDEX: 37.17 KG/M2 | WEIGHT: 202 LBS | RESPIRATION RATE: 16 BRPM | SYSTOLIC BLOOD PRESSURE: 148 MMHG | OXYGEN SATURATION: 96 % | HEART RATE: 94 BPM

## 2018-03-22 DIAGNOSIS — S09.90XA INJURY OF HEAD, INITIAL ENCOUNTER: Primary | ICD-10-CM

## 2018-03-22 PROCEDURE — 99284 EMERGENCY DEPT VISIT MOD MDM: CPT

## 2018-03-22 PROCEDURE — 6360000002 HC RX W HCPCS: Performed by: EMERGENCY MEDICINE

## 2018-03-22 PROCEDURE — 96372 THER/PROPH/DIAG INJ SC/IM: CPT

## 2018-03-22 PROCEDURE — 72125 CT NECK SPINE W/O DYE: CPT

## 2018-03-22 PROCEDURE — 70450 CT HEAD/BRAIN W/O DYE: CPT

## 2018-03-22 RX ORDER — PROMETHAZINE HYDROCHLORIDE 25 MG/1
25 TABLET ORAL EVERY 6 HOURS PRN
Qty: 20 TABLET | Refills: 0 | Status: SHIPPED | OUTPATIENT
Start: 2018-03-22 | End: 2018-03-29

## 2018-03-22 RX ORDER — DIPHENHYDRAMINE HYDROCHLORIDE 50 MG/ML
50 INJECTION INTRAMUSCULAR; INTRAVENOUS ONCE
Status: COMPLETED | OUTPATIENT
Start: 2018-03-22 | End: 2018-03-22

## 2018-03-22 RX ORDER — PROMETHAZINE HYDROCHLORIDE 25 MG/ML
25 INJECTION, SOLUTION INTRAMUSCULAR; INTRAVENOUS ONCE
Status: COMPLETED | OUTPATIENT
Start: 2018-03-22 | End: 2018-03-22

## 2018-03-22 RX ORDER — KETOROLAC TROMETHAMINE 30 MG/ML
30 INJECTION, SOLUTION INTRAMUSCULAR; INTRAVENOUS ONCE
Status: COMPLETED | OUTPATIENT
Start: 2018-03-22 | End: 2018-03-22

## 2018-03-22 RX ORDER — IBUPROFEN 600 MG/1
600 TABLET ORAL EVERY 6 HOURS PRN
Qty: 30 TABLET | Refills: 0 | Status: SHIPPED | OUTPATIENT
Start: 2018-03-22 | End: 2018-06-10 | Stop reason: ALTCHOICE

## 2018-03-22 RX ADMIN — DIPHENHYDRAMINE HYDROCHLORIDE 50 MG: 50 INJECTION, SOLUTION INTRAMUSCULAR; INTRAVENOUS at 12:35

## 2018-03-22 RX ADMIN — KETOROLAC TROMETHAMINE 30 MG: 30 INJECTION, SOLUTION INTRAMUSCULAR at 13:40

## 2018-03-22 RX ADMIN — PROMETHAZINE HYDROCHLORIDE 25 MG: 25 INJECTION INTRAMUSCULAR; INTRAVENOUS at 12:36

## 2018-03-22 ASSESSMENT — PAIN SCALES - GENERAL
PAINLEVEL_OUTOF10: 8
PAINLEVEL_OUTOF10: 9
PAINLEVEL_OUTOF10: 10

## 2018-03-22 ASSESSMENT — ENCOUNTER SYMPTOMS
PHOTOPHOBIA: 1
NAUSEA: 1

## 2018-03-22 NOTE — ED NOTES
I HAVE REVIEWED THE PROVIDER'S INSTRUCTIONS WITH THE PATIENT, ANSWERING ALL QUESTIONS TO  SATISFACTION, RECOMMENDED F/U APPOINTMENT, ADVISED PT DO NOT DRIVE WHILE TAKING PAIN MEDICATION OR NAUSEA MEDICATION GIVEN IN ED TODAY. ADVISED ON S/SX OF WORSENING ILLNESS AND WHEN TO RETURN TO THE ED AND/OR PCP. DISCUSSED OPTION TO SPEAK WITH PHYSICIAN AGAIN REGARDING PAIN AND NAUSEA, HOWEVER PT DECLINES AND STATES \"I WILL JUST GO HOME AND GO TO BED TO SEE IF I CAN SLEEP\" OFFERED  TO SPEAK WITH PHYSICIAN AS WELL, DECLINES AS WELL. PRESCRIPTIONS REVIEWED AND HOW TO USE MEDICATIONS AND TO F/U WITH PCP AND NEUROLOGIST. RECOMMENDED F/U TO HELP PT WITH POSSIBLE ASSISTANCE DEVICES TO PREVENT FUTURE FALLS.        Jovany Naqvi RN  03/22/18 6840

## 2018-03-22 NOTE — ED PROVIDER NOTES
of the specific conditions for emergent return, as well as the importance of follow-up    I have reviewed the disposition diagnosis with the patient and or their family/guardian. I have answered their questions and given discharge instructions. They voiced understanding of these instructions and did not have any further questions or complaints. PROCEDURES:  None    FINAL IMPRESSION      1. Injury of head, initial encounter          DISPOSITION/PLAN   DISPOSITION Decision To Discharge 03/22/2018 01:23:48 PM      CONDITION ON DISPOSITION:   Stable    PATIENT REFERRED TO:  DO Donna Daniel [address] [facility]  [phone]    Call in 3 days        DISCHARGE MEDICATIONS:  New Prescriptions    IBUPROFEN (ADVIL;MOTRIN) 600 MG TABLET    Take 1 tablet by mouth every 6 hours as needed for Pain    PROMETHAZINE (PHENERGAN) 25 MG TABLET    Take 1 tablet by mouth every 6 hours as needed for Nausea WARNING:  May cause drowsiness. May impair ability to operate vehicles or machinery. Do not use in combination with alcohol. (Please note that portions of this note were completed with a voice recognition program.  Efforts were made to edit the dictations but occasionally words are mis-transcribed.)    Salazar MD, F.A.C.E.P.   Attending Emergency Medicine Physician       Milady Rodrigues MD  03/22/18 9147

## 2018-03-22 NOTE — ED NOTES
Pt states that on Tuesday \"my legs gave out on me and I fell and hit the back of my head on the ground\" Since then, pt has developed a migraine and neck pain, states has tried maxalt (hx of migraines) which has not helped much. Currently rates pain 8/10 back of head and paraspinal tenderness. Neurovascular intact, alert, appropriate, answering questions and following commands appropriately. No focal deficits.       Adam Whitaker RN  03/22/18 4488

## 2018-04-26 ENCOUNTER — HOSPITAL ENCOUNTER (INPATIENT)
Age: 43
LOS: 3 days | Discharge: HOME | DRG: 103 | End: 2018-04-29
Attending: SPECIALIST | Admitting: INTERNAL MEDICINE
Payer: MEDICARE

## 2018-04-26 ENCOUNTER — APPOINTMENT (OUTPATIENT)
Dept: CT IMAGING | Age: 43
DRG: 103 | End: 2018-04-26
Payer: MEDICARE

## 2018-04-26 ENCOUNTER — APPOINTMENT (OUTPATIENT)
Dept: GENERAL RADIOLOGY | Age: 43
DRG: 103 | End: 2018-04-26
Payer: MEDICARE

## 2018-04-26 DIAGNOSIS — G43.919 INTRACTABLE MIGRAINE WITHOUT STATUS MIGRAINOSUS, UNSPECIFIED MIGRAINE TYPE: Primary | ICD-10-CM

## 2018-04-26 DIAGNOSIS — E86.0 DEHYDRATION: ICD-10-CM

## 2018-04-26 DIAGNOSIS — K59.00 CONSTIPATION, UNSPECIFIED CONSTIPATION TYPE: ICD-10-CM

## 2018-04-26 LAB
-: ABNORMAL
ABSOLUTE EOS #: 0.1 K/UL (ref 0–0.4)
ABSOLUTE IMMATURE GRANULOCYTE: ABNORMAL K/UL (ref 0–0.3)
ABSOLUTE LYMPH #: 1.8 K/UL (ref 1–4.8)
ABSOLUTE MONO #: 0.7 K/UL (ref 0.1–1.2)
ALBUMIN SERPL-MCNC: 4.8 G/DL (ref 3.5–5.2)
ALBUMIN/GLOBULIN RATIO: 1.4 (ref 1–2.5)
ALP BLD-CCNC: 98 U/L (ref 35–104)
ALT SERPL-CCNC: 8 U/L (ref 5–33)
AMORPHOUS: ABNORMAL
ANION GAP SERPL CALCULATED.3IONS-SCNC: 21 MMOL/L (ref 9–17)
AST SERPL-CCNC: 17 U/L
BACTERIA: ABNORMAL
BASOPHILS # BLD: 1 % (ref 0–2)
BASOPHILS ABSOLUTE: 0.1 K/UL (ref 0–0.2)
BILIRUB SERPL-MCNC: 0.38 MG/DL (ref 0.3–1.2)
BILIRUBIN URINE: NEGATIVE
BUN BLDV-MCNC: 8 MG/DL (ref 6–20)
BUN/CREAT BLD: ABNORMAL (ref 9–20)
CALCIUM SERPL-MCNC: 9.6 MG/DL (ref 8.6–10.4)
CASTS UA: ABNORMAL /LPF
CHLORIDE BLD-SCNC: 106 MMOL/L (ref 98–107)
CO2: 20 MMOL/L (ref 20–31)
COLOR: YELLOW
COMMENT UA: ABNORMAL
CREAT SERPL-MCNC: 0.77 MG/DL (ref 0.5–0.9)
CRYSTALS, UA: ABNORMAL /HPF
DIFFERENTIAL TYPE: ABNORMAL
EOSINOPHILS RELATIVE PERCENT: 1 % (ref 1–4)
EPITHELIAL CELLS UA: ABNORMAL /HPF (ref 0–5)
GFR AFRICAN AMERICAN: >60 ML/MIN
GFR NON-AFRICAN AMERICAN: >60 ML/MIN
GFR SERPL CREATININE-BSD FRML MDRD: ABNORMAL ML/MIN/{1.73_M2}
GFR SERPL CREATININE-BSD FRML MDRD: ABNORMAL ML/MIN/{1.73_M2}
GLUCOSE BLD-MCNC: 83 MG/DL (ref 70–99)
GLUCOSE URINE: NEGATIVE
HCG(URINE) PREGNANCY TEST: NEGATIVE
HCT VFR BLD CALC: 42.6 % (ref 36–46)
HEMOGLOBIN: 14.1 G/DL (ref 12–16)
IMMATURE GRANULOCYTES: ABNORMAL %
KETONES, URINE: ABNORMAL
LACTIC ACID: 1.5 MMOL/L (ref 0.5–2.2)
LEUKOCYTE ESTERASE, URINE: NEGATIVE
LIPASE: 20 U/L (ref 13–60)
LYMPHOCYTES # BLD: 15 % (ref 24–44)
MCH RBC QN AUTO: 28.8 PG (ref 26–34)
MCHC RBC AUTO-ENTMCNC: 33.2 G/DL (ref 31–37)
MCV RBC AUTO: 86.7 FL (ref 80–100)
MONOCYTES # BLD: 6 % (ref 2–11)
MUCUS: ABNORMAL
NITRITE, URINE: NEGATIVE
NRBC AUTOMATED: ABNORMAL PER 100 WBC
OTHER OBSERVATIONS UA: ABNORMAL
PDW BLD-RTO: 14.9 % (ref 12.5–15.4)
PH UA: 5.5 (ref 5–8)
PLATELET # BLD: 289 K/UL (ref 140–450)
PLATELET ESTIMATE: ABNORMAL
PMV BLD AUTO: 8.5 FL (ref 6–12)
POTASSIUM SERPL-SCNC: 3.7 MMOL/L (ref 3.7–5.3)
PROTEIN UA: NEGATIVE
RBC # BLD: 4.91 M/UL (ref 4–5.2)
RBC # BLD: ABNORMAL 10*6/UL
RBC UA: ABNORMAL /HPF (ref 0–2)
RENAL EPITHELIAL, UA: ABNORMAL /HPF
SEG NEUTROPHILS: 77 % (ref 36–66)
SEGMENTED NEUTROPHILS ABSOLUTE COUNT: 9.1 K/UL (ref 1.8–7.7)
SODIUM BLD-SCNC: 147 MMOL/L (ref 135–144)
SPECIFIC GRAVITY UA: 1.01 (ref 1–1.03)
TOTAL PROTEIN: 8.2 G/DL (ref 6.4–8.3)
TRICHOMONAS: ABNORMAL
TURBIDITY: ABNORMAL
URINE HGB: ABNORMAL
UROBILINOGEN, URINE: NORMAL
WBC # BLD: 11.7 K/UL (ref 3.5–11)
WBC # BLD: ABNORMAL 10*3/UL
WBC UA: ABNORMAL /HPF (ref 0–5)
YEAST: ABNORMAL

## 2018-04-26 PROCEDURE — 36415 COLL VENOUS BLD VENIPUNCTURE: CPT

## 2018-04-26 PROCEDURE — 87329 GIARDIA AG IA: CPT

## 2018-04-26 PROCEDURE — 96366 THER/PROPH/DIAG IV INF ADDON: CPT

## 2018-04-26 PROCEDURE — 74022 RADEX COMPL AQT ABD SERIES: CPT

## 2018-04-26 PROCEDURE — 85025 COMPLETE CBC W/AUTO DIFF WBC: CPT

## 2018-04-26 PROCEDURE — 87493 C DIFF AMPLIFIED PROBE: CPT

## 2018-04-26 PROCEDURE — 82272 OCCULT BLD FECES 1-3 TESTS: CPT

## 2018-04-26 PROCEDURE — 94762 N-INVAS EAR/PLS OXIMTRY CONT: CPT

## 2018-04-26 PROCEDURE — 84703 CHORIONIC GONADOTROPIN ASSAY: CPT

## 2018-04-26 PROCEDURE — 99285 EMERGENCY DEPT VISIT HI MDM: CPT

## 2018-04-26 PROCEDURE — 6360000004 HC RX CONTRAST MEDICATION: Performed by: EMERGENCY MEDICINE

## 2018-04-26 PROCEDURE — 2580000003 HC RX 258: Performed by: SPECIALIST

## 2018-04-26 PROCEDURE — 96375 TX/PRO/DX INJ NEW DRUG ADDON: CPT

## 2018-04-26 PROCEDURE — 6360000002 HC RX W HCPCS: Performed by: EMERGENCY MEDICINE

## 2018-04-26 PROCEDURE — 81001 URINALYSIS AUTO W/SCOPE: CPT

## 2018-04-26 PROCEDURE — 96365 THER/PROPH/DIAG IV INF INIT: CPT

## 2018-04-26 PROCEDURE — 83690 ASSAY OF LIPASE: CPT

## 2018-04-26 PROCEDURE — 80053 COMPREHEN METABOLIC PANEL: CPT

## 2018-04-26 PROCEDURE — 83630 LACTOFERRIN FECAL (QUAL): CPT

## 2018-04-26 PROCEDURE — 83605 ASSAY OF LACTIC ACID: CPT

## 2018-04-26 PROCEDURE — 96376 TX/PRO/DX INJ SAME DRUG ADON: CPT

## 2018-04-26 PROCEDURE — 2580000003 HC RX 258: Performed by: NURSE PRACTITIONER

## 2018-04-26 PROCEDURE — 6360000002 HC RX W HCPCS: Performed by: NURSE PRACTITIONER

## 2018-04-26 PROCEDURE — 6360000002 HC RX W HCPCS: Performed by: SPECIALIST

## 2018-04-26 PROCEDURE — 2580000003 HC RX 258: Performed by: EMERGENCY MEDICINE

## 2018-04-26 PROCEDURE — 74177 CT ABD & PELVIS W/CONTRAST: CPT

## 2018-04-26 PROCEDURE — 87505 NFCT AGENT DETECTION GI: CPT

## 2018-04-26 PROCEDURE — 1210000000 HC MED SURG R&B

## 2018-04-26 RX ORDER — SODIUM CHLORIDE 9 MG/ML
INJECTION, SOLUTION INTRAVENOUS CONTINUOUS
Status: DISCONTINUED | OUTPATIENT
Start: 2018-04-26 | End: 2018-04-29

## 2018-04-26 RX ORDER — KETOROLAC TROMETHAMINE 30 MG/ML
30 INJECTION, SOLUTION INTRAMUSCULAR; INTRAVENOUS EVERY 6 HOURS PRN
Status: DISCONTINUED | OUTPATIENT
Start: 2018-04-26 | End: 2018-04-29 | Stop reason: HOSPADM

## 2018-04-26 RX ORDER — 0.9 % SODIUM CHLORIDE 0.9 %
1000 INTRAVENOUS SOLUTION INTRAVENOUS ONCE
Status: COMPLETED | OUTPATIENT
Start: 2018-04-26 | End: 2018-04-26

## 2018-04-26 RX ORDER — DIPHENHYDRAMINE HYDROCHLORIDE 50 MG/ML
25 INJECTION INTRAMUSCULAR; INTRAVENOUS ONCE
Status: COMPLETED | OUTPATIENT
Start: 2018-04-26 | End: 2018-04-26

## 2018-04-26 RX ORDER — POTASSIUM CHLORIDE 7.45 MG/ML
10 INJECTION INTRAVENOUS PRN
Status: DISCONTINUED | OUTPATIENT
Start: 2018-04-26 | End: 2018-04-29 | Stop reason: HOSPADM

## 2018-04-26 RX ORDER — PROMETHAZINE HYDROCHLORIDE 25 MG/ML
12.5 INJECTION, SOLUTION INTRAMUSCULAR; INTRAVENOUS ONCE
Status: COMPLETED | OUTPATIENT
Start: 2018-04-26 | End: 2018-04-26

## 2018-04-26 RX ORDER — CLONAZEPAM 1 MG/1
1 TABLET ORAL 2 TIMES DAILY PRN
Status: DISCONTINUED | OUTPATIENT
Start: 2018-04-26 | End: 2018-04-29 | Stop reason: HOSPADM

## 2018-04-26 RX ORDER — LAMOTRIGINE 100 MG/1
100 TABLET ORAL DAILY
Status: ON HOLD | COMMUNITY
Start: 2018-04-17 | End: 2018-04-28 | Stop reason: HOSPADM

## 2018-04-26 RX ORDER — SODIUM CHLORIDE 0.9 % (FLUSH) 0.9 %
10 SYRINGE (ML) INJECTION EVERY 12 HOURS SCHEDULED
Status: DISCONTINUED | OUTPATIENT
Start: 2018-04-26 | End: 2018-04-29 | Stop reason: HOSPADM

## 2018-04-26 RX ORDER — METOCLOPRAMIDE HYDROCHLORIDE 5 MG/ML
10 INJECTION INTRAMUSCULAR; INTRAVENOUS ONCE
Status: COMPLETED | OUTPATIENT
Start: 2018-04-26 | End: 2018-04-26

## 2018-04-26 RX ORDER — BISACODYL 10 MG
10 SUPPOSITORY, RECTAL RECTAL DAILY PRN
Status: DISCONTINUED | OUTPATIENT
Start: 2018-04-26 | End: 2018-04-29 | Stop reason: HOSPADM

## 2018-04-26 RX ORDER — POTASSIUM CHLORIDE 20MEQ/15ML
40 LIQUID (ML) ORAL PRN
Status: DISCONTINUED | OUTPATIENT
Start: 2018-04-26 | End: 2018-04-29 | Stop reason: HOSPADM

## 2018-04-26 RX ORDER — NICOTINE 21 MG/24HR
1 PATCH, TRANSDERMAL 24 HOURS TRANSDERMAL DAILY PRN
Status: DISCONTINUED | OUTPATIENT
Start: 2018-04-26 | End: 2018-04-29 | Stop reason: HOSPADM

## 2018-04-26 RX ORDER — SODIUM CHLORIDE 0.9 % (FLUSH) 0.9 %
10 SYRINGE (ML) INJECTION PRN
Status: DISCONTINUED | OUTPATIENT
Start: 2018-04-26 | End: 2018-04-29 | Stop reason: HOSPADM

## 2018-04-26 RX ORDER — MAGNESIUM SULFATE 1 G/100ML
1 INJECTION INTRAVENOUS PRN
Status: DISCONTINUED | OUTPATIENT
Start: 2018-04-26 | End: 2018-04-29 | Stop reason: HOSPADM

## 2018-04-26 RX ORDER — POTASSIUM CHLORIDE 20 MEQ/1
40 TABLET, EXTENDED RELEASE ORAL PRN
Status: DISCONTINUED | OUTPATIENT
Start: 2018-04-26 | End: 2018-04-29 | Stop reason: HOSPADM

## 2018-04-26 RX ORDER — KETOROLAC TROMETHAMINE 30 MG/ML
30 INJECTION, SOLUTION INTRAMUSCULAR; INTRAVENOUS ONCE
Status: COMPLETED | OUTPATIENT
Start: 2018-04-26 | End: 2018-04-26

## 2018-04-26 RX ORDER — PROMETHAZINE HYDROCHLORIDE 25 MG/ML
12.5 INJECTION, SOLUTION INTRAMUSCULAR; INTRAVENOUS EVERY 6 HOURS PRN
Status: DISCONTINUED | OUTPATIENT
Start: 2018-04-26 | End: 2018-04-27

## 2018-04-26 RX ORDER — 0.9 % SODIUM CHLORIDE 0.9 %
80 INTRAVENOUS SOLUTION INTRAVENOUS ONCE
Status: COMPLETED | OUTPATIENT
Start: 2018-04-26 | End: 2018-04-26

## 2018-04-26 RX ORDER — DIPHENHYDRAMINE HYDROCHLORIDE 50 MG/ML
25 INJECTION INTRAMUSCULAR; INTRAVENOUS EVERY 6 HOURS PRN
Status: DISCONTINUED | OUTPATIENT
Start: 2018-04-26 | End: 2018-04-27

## 2018-04-26 RX ADMIN — DIPHENHYDRAMINE HYDROCHLORIDE 25 MG: 50 INJECTION, SOLUTION INTRAMUSCULAR; INTRAVENOUS at 20:37

## 2018-04-26 RX ADMIN — KETOROLAC TROMETHAMINE 30 MG: 30 INJECTION, SOLUTION INTRAMUSCULAR; INTRAVENOUS at 23:40

## 2018-04-26 RX ADMIN — SODIUM CHLORIDE: 9 INJECTION, SOLUTION INTRAVENOUS at 16:03

## 2018-04-26 RX ADMIN — SODIUM CHLORIDE: 9 INJECTION, SOLUTION INTRAVENOUS at 22:00

## 2018-04-26 RX ADMIN — SODIUM CHLORIDE 80 ML: 9 INJECTION, SOLUTION INTRAVENOUS at 19:30

## 2018-04-26 RX ADMIN — SODIUM CHLORIDE 1000 ML: 9 INJECTION, SOLUTION INTRAVENOUS at 16:33

## 2018-04-26 RX ADMIN — SODIUM CHLORIDE 1000 ML: 9 INJECTION, SOLUTION INTRAVENOUS at 15:03

## 2018-04-26 RX ADMIN — Medication 10 ML: at 22:00

## 2018-04-26 RX ADMIN — IOPAMIDOL 75 ML: 755 INJECTION, SOLUTION INTRAVENOUS at 19:30

## 2018-04-26 RX ADMIN — PROMETHAZINE HYDROCHLORIDE 12.5 MG: 25 INJECTION INTRAMUSCULAR; INTRAVENOUS at 20:37

## 2018-04-26 RX ADMIN — MAGNESIUM SULFATE 2 G: 500 INJECTION, SOLUTION INTRAMUSCULAR; INTRAVENOUS at 16:32

## 2018-04-26 RX ADMIN — METOCLOPRAMIDE 10 MG: 5 INJECTION, SOLUTION INTRAMUSCULAR; INTRAVENOUS at 15:02

## 2018-04-26 RX ADMIN — Medication 10 ML: at 19:30

## 2018-04-26 RX ADMIN — KETOROLAC TROMETHAMINE 30 MG: 30 INJECTION, SOLUTION INTRAMUSCULAR at 15:03

## 2018-04-26 RX ADMIN — DIPHENHYDRAMINE HYDROCHLORIDE 25 MG: 50 INJECTION, SOLUTION INTRAMUSCULAR; INTRAVENOUS at 15:03

## 2018-04-26 RX ADMIN — PROMETHAZINE HYDROCHLORIDE 12.5 MG: 25 INJECTION INTRAMUSCULAR; INTRAVENOUS at 16:28

## 2018-04-26 ASSESSMENT — ENCOUNTER SYMPTOMS
BLOOD IN STOOL: 0
PHOTOPHOBIA: 1
NAUSEA: 1
CONSTIPATION: 1
VOMITING: 1

## 2018-04-26 ASSESSMENT — PAIN SCALES - GENERAL
PAINLEVEL_OUTOF10: 9
PAINLEVEL_OUTOF10: 10
PAINLEVEL_OUTOF10: 9
PAINLEVEL_OUTOF10: 10
PAINLEVEL_OUTOF10: 9

## 2018-04-26 ASSESSMENT — PAIN DESCRIPTION - LOCATION
LOCATION: HEAD
LOCATION: HEAD
LOCATION: ABDOMEN

## 2018-04-26 ASSESSMENT — PAIN DESCRIPTION - DESCRIPTORS
DESCRIPTORS: CONSTANT;THROBBING
DESCRIPTORS: ACHING

## 2018-04-26 ASSESSMENT — PAIN DESCRIPTION - PAIN TYPE
TYPE: ACUTE PAIN

## 2018-04-27 ENCOUNTER — APPOINTMENT (OUTPATIENT)
Dept: CT IMAGING | Age: 43
DRG: 103 | End: 2018-04-27
Payer: MEDICARE

## 2018-04-27 PROBLEM — M54.2 CHRONIC NECK PAIN: Status: ACTIVE | Noted: 2018-04-27

## 2018-04-27 PROBLEM — R11.2 INTRACTABLE NAUSEA AND VOMITING: Status: ACTIVE | Noted: 2018-04-27

## 2018-04-27 PROBLEM — M54.50 CHRONIC LOW BACK PAIN: Status: ACTIVE | Noted: 2018-04-27

## 2018-04-27 PROBLEM — G44.201 ACUTE INTRACTABLE TENSION-TYPE HEADACHE: Status: ACTIVE | Noted: 2018-04-27

## 2018-04-27 PROBLEM — G89.29 CHRONIC LOW BACK PAIN: Status: ACTIVE | Noted: 2018-04-27

## 2018-04-27 PROBLEM — G89.29 CHRONIC NECK PAIN: Status: ACTIVE | Noted: 2018-04-27

## 2018-04-27 PROBLEM — I16.9 HYPERTENSIVE CRISIS: Status: ACTIVE | Noted: 2018-04-27

## 2018-04-27 PROBLEM — R03.0 TRANSIENT ELEVATED BLOOD PRESSURE: Status: ACTIVE | Noted: 2018-04-27

## 2018-04-27 PROBLEM — Z74.09: Status: ACTIVE | Noted: 2018-04-27

## 2018-04-27 PROBLEM — G43.901 STATUS MIGRAINOSUS: Status: ACTIVE | Noted: 2018-04-27

## 2018-04-27 LAB
ALBUMIN SERPL-MCNC: 4.3 G/DL (ref 3.5–5.2)
ALBUMIN/GLOBULIN RATIO: 1.3 (ref 1–2.5)
ALP BLD-CCNC: 89 U/L (ref 35–104)
ALT SERPL-CCNC: 14 U/L (ref 5–33)
AMYLASE: 67 U/L (ref 28–100)
ANION GAP SERPL CALCULATED.3IONS-SCNC: 18 MMOL/L (ref 9–17)
AST SERPL-CCNC: 20 U/L
BILIRUB SERPL-MCNC: 0.75 MG/DL (ref 0.3–1.2)
BUN BLDV-MCNC: 6 MG/DL (ref 6–20)
BUN/CREAT BLD: ABNORMAL (ref 9–20)
C DIFFICILE TOXINS, PCR: NORMAL
CALCIUM SERPL-MCNC: 8.7 MG/DL (ref 8.6–10.4)
CAMPYLOBACTER PCR: NORMAL
CHLORIDE BLD-SCNC: 107 MMOL/L (ref 98–107)
CO2: 20 MMOL/L (ref 20–31)
CREAT SERPL-MCNC: 0.51 MG/DL (ref 0.5–0.9)
DATE, STOOL #1: NORMAL
DATE, STOOL #2: NORMAL
DATE, STOOL #3: NORMAL
EKG ATRIAL RATE: 75 BPM
EKG P AXIS: 47 DEGREES
EKG P-R INTERVAL: 152 MS
EKG Q-T INTERVAL: 384 MS
EKG QRS DURATION: 76 MS
EKG QTC CALCULATION (BAZETT): 428 MS
EKG R AXIS: 4 DEGREES
EKG T AXIS: 31 DEGREES
EKG VENTRICULAR RATE: 75 BPM
GFR AFRICAN AMERICAN: >60 ML/MIN
GFR NON-AFRICAN AMERICAN: >60 ML/MIN
GFR SERPL CREATININE-BSD FRML MDRD: ABNORMAL ML/MIN/{1.73_M2}
GFR SERPL CREATININE-BSD FRML MDRD: ABNORMAL ML/MIN/{1.73_M2}
GLUCOSE BLD-MCNC: 99 MG/DL (ref 70–99)
HCT VFR BLD CALC: 41.3 % (ref 36–46)
HEMOCCULT SP1 STL QL: NEGATIVE
HEMOCCULT SP2 STL QL: NORMAL
HEMOCCULT SP3 STL QL: NORMAL
HEMOGLOBIN: 13.8 G/DL (ref 12–16)
LACTOFERRIN, QUAL: ABNORMAL
LIPASE: 22 U/L (ref 13–60)
MAGNESIUM: 2.2 MG/DL (ref 1.6–2.6)
MCH RBC QN AUTO: 28.6 PG (ref 26–34)
MCHC RBC AUTO-ENTMCNC: 33.3 G/DL (ref 31–37)
MCV RBC AUTO: 86 FL (ref 80–100)
NRBC AUTOMATED: NORMAL PER 100 WBC
PDW BLD-RTO: 15.1 % (ref 12.5–15.4)
PHOSPHORUS: 3.1 MG/DL (ref 2.6–4.5)
PLATELET # BLD: 286 K/UL (ref 140–450)
PMV BLD AUTO: 8.4 FL (ref 6–12)
POTASSIUM SERPL-SCNC: 4.6 MMOL/L (ref 3.7–5.3)
RBC # BLD: 4.8 M/UL (ref 4–5.2)
SALMONELLA PCR: NORMAL
SHIGATOXIN GENE PCR: NORMAL
SHIGELLA SP PCR: NORMAL
SODIUM BLD-SCNC: 145 MMOL/L (ref 135–144)
SPECIMEN DESCRIPTION: NORMAL
SPECIMEN: NORMAL
TIME, STOOL #1: 2330
TIME, STOOL #2: NORMAL
TIME, STOOL #3: NORMAL
TOTAL PROTEIN: 7.5 G/DL (ref 6.4–8.3)
TROPONIN INTERP: NORMAL
TROPONIN T: <0.03 NG/ML
WBC # BLD: 9.2 K/UL (ref 3.5–11)

## 2018-04-27 PROCEDURE — 93005 ELECTROCARDIOGRAM TRACING: CPT

## 2018-04-27 PROCEDURE — 76937 US GUIDE VASCULAR ACCESS: CPT

## 2018-04-27 PROCEDURE — 2580000003 HC RX 258: Performed by: NURSE PRACTITIONER

## 2018-04-27 PROCEDURE — 6370000000 HC RX 637 (ALT 250 FOR IP): Performed by: NURSE PRACTITIONER

## 2018-04-27 PROCEDURE — 2580000003 HC RX 258: Performed by: SPECIALIST

## 2018-04-27 PROCEDURE — 99222 1ST HOSP IP/OBS MODERATE 55: CPT | Performed by: INTERNAL MEDICINE

## 2018-04-27 PROCEDURE — 6360000002 HC RX W HCPCS: Performed by: EMERGENCY MEDICINE

## 2018-04-27 PROCEDURE — 84100 ASSAY OF PHOSPHORUS: CPT

## 2018-04-27 PROCEDURE — 6360000002 HC RX W HCPCS: Performed by: INTERNAL MEDICINE

## 2018-04-27 PROCEDURE — 85027 COMPLETE CBC AUTOMATED: CPT

## 2018-04-27 PROCEDURE — 6370000000 HC RX 637 (ALT 250 FOR IP): Performed by: INTERNAL MEDICINE

## 2018-04-27 PROCEDURE — 36415 COLL VENOUS BLD VENIPUNCTURE: CPT

## 2018-04-27 PROCEDURE — 6360000002 HC RX W HCPCS: Performed by: NURSE PRACTITIONER

## 2018-04-27 PROCEDURE — 2060000000 HC ICU INTERMEDIATE R&B

## 2018-04-27 PROCEDURE — 83690 ASSAY OF LIPASE: CPT

## 2018-04-27 PROCEDURE — 83735 ASSAY OF MAGNESIUM: CPT

## 2018-04-27 PROCEDURE — 84484 ASSAY OF TROPONIN QUANT: CPT

## 2018-04-27 PROCEDURE — 2500000003 HC RX 250 WO HCPCS: Performed by: NURSE PRACTITIONER

## 2018-04-27 PROCEDURE — 70450 CT HEAD/BRAIN W/O DYE: CPT

## 2018-04-27 PROCEDURE — 80053 COMPREHEN METABOLIC PANEL: CPT

## 2018-04-27 PROCEDURE — 99222 1ST HOSP IP/OBS MODERATE 55: CPT | Performed by: PSYCHIATRY & NEUROLOGY

## 2018-04-27 PROCEDURE — 6360000002 HC RX W HCPCS

## 2018-04-27 PROCEDURE — 82150 ASSAY OF AMYLASE: CPT

## 2018-04-27 RX ORDER — METOCLOPRAMIDE HYDROCHLORIDE 5 MG/ML
10 INJECTION INTRAMUSCULAR; INTRAVENOUS EVERY 8 HOURS
Status: DISCONTINUED | OUTPATIENT
Start: 2018-04-27 | End: 2018-04-27 | Stop reason: SDUPTHER

## 2018-04-27 RX ORDER — MORPHINE SULFATE 4 MG/ML
INJECTION, SOLUTION INTRAMUSCULAR; INTRAVENOUS
Status: COMPLETED
Start: 2018-04-27 | End: 2018-04-27

## 2018-04-27 RX ORDER — METOCLOPRAMIDE HYDROCHLORIDE 5 MG/ML
10 INJECTION INTRAMUSCULAR; INTRAVENOUS EVERY 8 HOURS
Status: COMPLETED | OUTPATIENT
Start: 2018-04-28 | End: 2018-04-29

## 2018-04-27 RX ORDER — METOPROLOL TARTRATE 5 MG/5ML
5 INJECTION INTRAVENOUS EVERY 5 MIN PRN
Status: COMPLETED | OUTPATIENT
Start: 2018-04-27 | End: 2018-04-27

## 2018-04-27 RX ORDER — MORPHINE SULFATE 4 MG/ML
4 INJECTION, SOLUTION INTRAMUSCULAR; INTRAVENOUS EVERY 4 HOURS PRN
Status: COMPLETED | OUTPATIENT
Start: 2018-04-27 | End: 2018-04-27

## 2018-04-27 RX ORDER — 0.9 % SODIUM CHLORIDE 0.9 %
1000 INTRAVENOUS SOLUTION INTRAVENOUS ONCE
Status: DISCONTINUED | OUTPATIENT
Start: 2018-04-27 | End: 2018-04-27

## 2018-04-27 RX ORDER — HYDRALAZINE HYDROCHLORIDE 20 MG/ML
20 INJECTION INTRAMUSCULAR; INTRAVENOUS ONCE
Status: COMPLETED | OUTPATIENT
Start: 2018-04-27 | End: 2018-04-27

## 2018-04-27 RX ORDER — NALBUPHINE HCL 10 MG/ML
20 AMPUL (ML) INJECTION
Status: DISCONTINUED | OUTPATIENT
Start: 2018-04-27 | End: 2018-04-29 | Stop reason: HOSPADM

## 2018-04-27 RX ORDER — DIHYDROERGOTAMINE MESYLATE 1 MG/ML
0.5 INJECTION, SOLUTION INTRAMUSCULAR; INTRAVENOUS; SUBCUTANEOUS EVERY 8 HOURS
Status: DISCONTINUED | OUTPATIENT
Start: 2018-04-29 | End: 2018-04-27 | Stop reason: SDUPTHER

## 2018-04-27 RX ORDER — ACETAMINOPHEN, ASPIRIN AND CAFFEINE 250; 250; 65 MG/1; MG/1; MG/1
1 TABLET, FILM COATED ORAL EVERY 6 HOURS PRN
Status: DISCONTINUED | OUTPATIENT
Start: 2018-04-27 | End: 2018-04-29 | Stop reason: HOSPADM

## 2018-04-27 RX ORDER — NITROGLYCERIN 0.4 MG/1
0.4 TABLET SUBLINGUAL EVERY 5 MIN PRN
Status: DISCONTINUED | OUTPATIENT
Start: 2018-04-27 | End: 2018-04-29 | Stop reason: HOSPADM

## 2018-04-27 RX ORDER — DIPHENHYDRAMINE HYDROCHLORIDE 50 MG/ML
25 INJECTION INTRAMUSCULAR; INTRAVENOUS EVERY 6 HOURS PRN
Status: DISCONTINUED | OUTPATIENT
Start: 2018-04-27 | End: 2018-04-29 | Stop reason: HOSPADM

## 2018-04-27 RX ORDER — DIPHENHYDRAMINE HYDROCHLORIDE 50 MG/ML
25 INJECTION INTRAMUSCULAR; INTRAVENOUS ONCE
Status: COMPLETED | OUTPATIENT
Start: 2018-04-27 | End: 2018-04-27

## 2018-04-27 RX ORDER — OXYCODONE HYDROCHLORIDE AND ACETAMINOPHEN 5; 325 MG/1; MG/1
2 TABLET ORAL EVERY 6 HOURS PRN
Status: DISCONTINUED | OUTPATIENT
Start: 2018-04-27 | End: 2018-04-29 | Stop reason: HOSPADM

## 2018-04-27 RX ORDER — PROMETHAZINE HYDROCHLORIDE 25 MG/ML
12.5 INJECTION, SOLUTION INTRAMUSCULAR; INTRAVENOUS EVERY 6 HOURS PRN
Status: DISCONTINUED | OUTPATIENT
Start: 2018-04-27 | End: 2018-04-29 | Stop reason: HOSPADM

## 2018-04-27 RX ORDER — LORAZEPAM 2 MG/ML
1 INJECTION INTRAMUSCULAR EVERY 6 HOURS PRN
Status: COMPLETED | OUTPATIENT
Start: 2018-04-27 | End: 2018-04-27

## 2018-04-27 RX ORDER — PROMETHAZINE HYDROCHLORIDE 25 MG/ML
6.25 INJECTION, SOLUTION INTRAMUSCULAR; INTRAVENOUS ONCE
Status: COMPLETED | OUTPATIENT
Start: 2018-04-27 | End: 2018-04-27

## 2018-04-27 RX ORDER — DIHYDROERGOTAMINE MESYLATE 1 MG/ML
0.5 INJECTION, SOLUTION INTRAMUSCULAR; INTRAVENOUS; SUBCUTANEOUS EVERY 8 HOURS
Status: COMPLETED | OUTPATIENT
Start: 2018-04-28 | End: 2018-04-29

## 2018-04-27 RX ORDER — NITROGLYCERIN 0.4 MG/1
TABLET SUBLINGUAL
Status: DISPENSED
Start: 2018-04-27 | End: 2018-04-27

## 2018-04-27 RX ADMIN — METOCLOPRAMIDE 10 MG: 5 INJECTION, SOLUTION INTRAMUSCULAR; INTRAVENOUS at 18:43

## 2018-04-27 RX ADMIN — LORAZEPAM 1 MG: 2 INJECTION INTRAMUSCULAR; INTRAVENOUS at 06:46

## 2018-04-27 RX ADMIN — CLONAZEPAM 1 MG: 1 TABLET ORAL at 00:59

## 2018-04-27 RX ADMIN — MORPHINE SULFATE 4 MG: 4 INJECTION INTRAVENOUS at 05:57

## 2018-04-27 RX ADMIN — NITROGLYCERIN 0.4 MG: 0.4 TABLET SUBLINGUAL at 06:10

## 2018-04-27 RX ADMIN — HYDROMORPHONE HYDROCHLORIDE 1 MG: 1 INJECTION, SOLUTION INTRAMUSCULAR; INTRAVENOUS; SUBCUTANEOUS at 10:52

## 2018-04-27 RX ADMIN — MORPHINE SULFATE 4 MG: 4 INJECTION INTRAVENOUS at 06:00

## 2018-04-27 RX ADMIN — PROMETHAZINE HYDROCHLORIDE 6.25 MG: 25 INJECTION INTRAMUSCULAR; INTRAVENOUS at 10:52

## 2018-04-27 RX ADMIN — SODIUM CHLORIDE: 9 INJECTION, SOLUTION INTRAVENOUS at 07:43

## 2018-04-27 RX ADMIN — NITROGLYCERIN 0.4 MG: 0.4 TABLET SUBLINGUAL at 06:00

## 2018-04-27 RX ADMIN — PROMETHAZINE HYDROCHLORIDE 12.5 MG: 25 INJECTION INTRAMUSCULAR; INTRAVENOUS at 15:01

## 2018-04-27 RX ADMIN — DIPHENHYDRAMINE HYDROCHLORIDE 25 MG: 50 INJECTION, SOLUTION INTRAMUSCULAR; INTRAVENOUS at 02:03

## 2018-04-27 RX ADMIN — DIPHENHYDRAMINE HYDROCHLORIDE 25 MG: 50 INJECTION, SOLUTION INTRAMUSCULAR; INTRAVENOUS at 10:53

## 2018-04-27 RX ADMIN — METOPROLOL TARTRATE 5 MG: 5 INJECTION, SOLUTION INTRAVENOUS at 05:38

## 2018-04-27 RX ADMIN — PROMETHAZINE HYDROCHLORIDE 12.5 MG: 25 INJECTION INTRAMUSCULAR; INTRAVENOUS at 02:03

## 2018-04-27 RX ADMIN — METOPROLOL TARTRATE 5 MG: 5 INJECTION, SOLUTION INTRAVENOUS at 05:27

## 2018-04-27 RX ADMIN — OXYCODONE HYDROCHLORIDE AND ACETAMINOPHEN 2 TABLET: 5; 325 TABLET ORAL at 13:22

## 2018-04-27 RX ADMIN — HYDROMORPHONE HYDROCHLORIDE 1 MG: 1 INJECTION, SOLUTION INTRAMUSCULAR; INTRAVENOUS; SUBCUTANEOUS at 21:01

## 2018-04-27 RX ADMIN — NITROGLYCERIN 0.4 MG: 0.4 TABLET SUBLINGUAL at 06:20

## 2018-04-27 RX ADMIN — KETOROLAC TROMETHAMINE 30 MG: 30 INJECTION, SOLUTION INTRAMUSCULAR; INTRAVENOUS at 08:54

## 2018-04-27 RX ADMIN — DIHYDROERGOTAMINE MESYLATE 0.5 MG: 1 INJECTION, SOLUTION INTRAMUSCULAR; INTRAVENOUS; SUBCUTANEOUS at 18:44

## 2018-04-27 RX ADMIN — PROMETHAZINE HYDROCHLORIDE 12.5 MG: 25 INJECTION INTRAMUSCULAR; INTRAVENOUS at 09:14

## 2018-04-27 RX ADMIN — HYDRALAZINE HYDROCHLORIDE 20 MG: 20 INJECTION, SOLUTION INTRAMUSCULAR; INTRAVENOUS at 07:38

## 2018-04-27 RX ADMIN — METOPROLOL TARTRATE 5 MG: 5 INJECTION, SOLUTION INTRAVENOUS at 05:21

## 2018-04-27 RX ADMIN — Medication 10 ML: at 21:01

## 2018-04-27 RX ADMIN — PROMETHAZINE HYDROCHLORIDE 12.5 MG: 25 INJECTION INTRAMUSCULAR; INTRAVENOUS at 23:44

## 2018-04-27 RX ADMIN — SODIUM CHLORIDE: 9 INJECTION, SOLUTION INTRAVENOUS at 21:05

## 2018-04-27 ASSESSMENT — PAIN SCALES - GENERAL
PAINLEVEL_OUTOF10: 10
PAINLEVEL_OUTOF10: 8
PAINLEVEL_OUTOF10: 10
PAINLEVEL_OUTOF10: 10
PAINLEVEL_OUTOF10: 7
PAINLEVEL_OUTOF10: 8
PAINLEVEL_OUTOF10: 10

## 2018-04-27 ASSESSMENT — PAIN DESCRIPTION - LOCATION
LOCATION: HEAD
LOCATION: HEAD

## 2018-04-27 ASSESSMENT — PAIN DESCRIPTION - DESCRIPTORS
DESCRIPTORS: HEADACHE
DESCRIPTORS: HEADACHE

## 2018-04-27 ASSESSMENT — PAIN DESCRIPTION - PAIN TYPE
TYPE: ACUTE PAIN
TYPE: ACUTE PAIN

## 2018-04-28 PROCEDURE — 6360000002 HC RX W HCPCS: Performed by: NURSE PRACTITIONER

## 2018-04-28 PROCEDURE — 2060000000 HC ICU INTERMEDIATE R&B

## 2018-04-28 PROCEDURE — 99232 SBSQ HOSP IP/OBS MODERATE 35: CPT | Performed by: INTERNAL MEDICINE

## 2018-04-28 PROCEDURE — 6370000000 HC RX 637 (ALT 250 FOR IP): Performed by: PSYCHIATRY & NEUROLOGY

## 2018-04-28 PROCEDURE — 94762 N-INVAS EAR/PLS OXIMTRY CONT: CPT

## 2018-04-28 PROCEDURE — 2580000003 HC RX 258: Performed by: NURSE PRACTITIONER

## 2018-04-28 PROCEDURE — 99232 SBSQ HOSP IP/OBS MODERATE 35: CPT | Performed by: PSYCHIATRY & NEUROLOGY

## 2018-04-28 PROCEDURE — 2580000003 HC RX 258: Performed by: SPECIALIST

## 2018-04-28 PROCEDURE — 51701 INSERT BLADDER CATHETER: CPT

## 2018-04-28 PROCEDURE — 51798 US URINE CAPACITY MEASURE: CPT

## 2018-04-28 PROCEDURE — 6360000002 HC RX W HCPCS: Performed by: INTERNAL MEDICINE

## 2018-04-28 PROCEDURE — 6370000000 HC RX 637 (ALT 250 FOR IP): Performed by: INTERNAL MEDICINE

## 2018-04-28 RX ORDER — AMLODIPINE BESYLATE 5 MG/1
5 TABLET ORAL DAILY
Status: DISCONTINUED | OUTPATIENT
Start: 2018-04-28 | End: 2018-04-29 | Stop reason: HOSPADM

## 2018-04-28 RX ORDER — AMLODIPINE BESYLATE 5 MG/1
5 TABLET ORAL DAILY
Qty: 30 TABLET | Refills: 3 | Status: SHIPPED | OUTPATIENT
Start: 2018-04-29 | End: 2018-04-29 | Stop reason: HOSPADM

## 2018-04-28 RX ORDER — CLONIDINE HYDROCHLORIDE 0.1 MG/1
0.1 TABLET ORAL EVERY 4 HOURS PRN
Status: DISCONTINUED | OUTPATIENT
Start: 2018-04-28 | End: 2018-04-29 | Stop reason: HOSPADM

## 2018-04-28 RX ADMIN — HYDROMORPHONE HYDROCHLORIDE 1 MG: 1 INJECTION, SOLUTION INTRAMUSCULAR; INTRAVENOUS; SUBCUTANEOUS at 19:27

## 2018-04-28 RX ADMIN — DIHYDROERGOTAMINE MESYLATE 0.5 MG: 1 INJECTION, SOLUTION INTRAMUSCULAR; INTRAVENOUS; SUBCUTANEOUS at 03:22

## 2018-04-28 RX ADMIN — CLONIDINE HYDROCHLORIDE 0.1 MG: 0.1 TABLET ORAL at 12:53

## 2018-04-28 RX ADMIN — OXYCODONE HYDROCHLORIDE AND ACETAMINOPHEN 2 TABLET: 5; 325 TABLET ORAL at 17:18

## 2018-04-28 RX ADMIN — METOCLOPRAMIDE 10 MG: 5 INJECTION, SOLUTION INTRAMUSCULAR; INTRAVENOUS at 03:22

## 2018-04-28 RX ADMIN — SODIUM CHLORIDE: 9 INJECTION, SOLUTION INTRAVENOUS at 06:48

## 2018-04-28 RX ADMIN — DIHYDROERGOTAMINE MESYLATE 0.5 MG: 1 INJECTION, SOLUTION INTRAMUSCULAR; INTRAVENOUS; SUBCUTANEOUS at 10:32

## 2018-04-28 RX ADMIN — METOCLOPRAMIDE 10 MG: 5 INJECTION, SOLUTION INTRAMUSCULAR; INTRAVENOUS at 18:29

## 2018-04-28 RX ADMIN — HYDROMORPHONE HYDROCHLORIDE 1 MG: 1 INJECTION, SOLUTION INTRAMUSCULAR; INTRAVENOUS; SUBCUTANEOUS at 07:01

## 2018-04-28 RX ADMIN — Medication 10 ML: at 09:00

## 2018-04-28 RX ADMIN — AMLODIPINE BESYLATE 5 MG: 5 TABLET ORAL at 10:32

## 2018-04-28 RX ADMIN — PROMETHAZINE HYDROCHLORIDE 12.5 MG: 25 INJECTION INTRAMUSCULAR; INTRAVENOUS at 17:25

## 2018-04-28 RX ADMIN — METOCLOPRAMIDE 10 MG: 5 INJECTION, SOLUTION INTRAMUSCULAR; INTRAVENOUS at 10:32

## 2018-04-28 RX ADMIN — CLONIDINE HYDROCHLORIDE 0.1 MG: 0.1 TABLET ORAL at 21:04

## 2018-04-28 RX ADMIN — OXYCODONE HYDROCHLORIDE AND ACETAMINOPHEN 2 TABLET: 5; 325 TABLET ORAL at 08:46

## 2018-04-28 RX ADMIN — DIHYDROERGOTAMINE MESYLATE 0.5 MG: 1 INJECTION, SOLUTION INTRAMUSCULAR; INTRAVENOUS; SUBCUTANEOUS at 18:29

## 2018-04-28 RX ADMIN — DIPHENHYDRAMINE HYDROCHLORIDE 25 MG: 50 INJECTION, SOLUTION INTRAMUSCULAR; INTRAVENOUS at 09:05

## 2018-04-28 RX ADMIN — DIPHENHYDRAMINE HYDROCHLORIDE 25 MG: 50 INJECTION, SOLUTION INTRAMUSCULAR; INTRAVENOUS at 21:05

## 2018-04-28 RX ADMIN — ENOXAPARIN SODIUM 40 MG: 40 INJECTION SUBCUTANEOUS at 09:05

## 2018-04-28 RX ADMIN — VERAPAMIL HYDROCHLORIDE 120 MG: 120 TABLET, FILM COATED, EXTENDED RELEASE ORAL at 21:04

## 2018-04-28 RX ADMIN — HYDROMORPHONE HYDROCHLORIDE 1 MG: 1 INJECTION, SOLUTION INTRAMUSCULAR; INTRAVENOUS; SUBCUTANEOUS at 13:21

## 2018-04-28 RX ADMIN — Medication 10 ML: at 21:05

## 2018-04-28 RX ADMIN — OXYCODONE HYDROCHLORIDE AND ACETAMINOPHEN 2 TABLET: 5; 325 TABLET ORAL at 01:10

## 2018-04-28 ASSESSMENT — PAIN DESCRIPTION - PAIN TYPE: TYPE: ACUTE PAIN

## 2018-04-28 ASSESSMENT — PAIN SCALES - GENERAL
PAINLEVEL_OUTOF10: 8
PAINLEVEL_OUTOF10: 7
PAINLEVEL_OUTOF10: 5
PAINLEVEL_OUTOF10: 8
PAINLEVEL_OUTOF10: 5
PAINLEVEL_OUTOF10: 7
PAINLEVEL_OUTOF10: 6
PAINLEVEL_OUTOF10: 5
PAINLEVEL_OUTOF10: 8

## 2018-04-28 ASSESSMENT — PAIN DESCRIPTION - PROGRESSION
CLINICAL_PROGRESSION: GRADUALLY IMPROVING

## 2018-04-28 ASSESSMENT — PAIN DESCRIPTION - LOCATION: LOCATION: HEAD

## 2018-04-28 ASSESSMENT — PAIN DESCRIPTION - FREQUENCY: FREQUENCY: INTERMITTENT

## 2018-04-28 ASSESSMENT — PAIN DESCRIPTION - ONSET: ONSET: GRADUAL

## 2018-04-28 ASSESSMENT — PAIN DESCRIPTION - DESCRIPTORS: DESCRIPTORS: ACHING

## 2018-04-29 VITALS
SYSTOLIC BLOOD PRESSURE: 184 MMHG | HEIGHT: 62 IN | OXYGEN SATURATION: 100 % | HEART RATE: 93 BPM | WEIGHT: 201.94 LBS | DIASTOLIC BLOOD PRESSURE: 114 MMHG | RESPIRATION RATE: 18 BRPM | BODY MASS INDEX: 37.16 KG/M2 | TEMPERATURE: 98.2 F

## 2018-04-29 PROCEDURE — 6360000002 HC RX W HCPCS: Performed by: NURSE PRACTITIONER

## 2018-04-29 PROCEDURE — 6370000000 HC RX 637 (ALT 250 FOR IP): Performed by: INTERNAL MEDICINE

## 2018-04-29 PROCEDURE — 2580000003 HC RX 258: Performed by: SPECIALIST

## 2018-04-29 PROCEDURE — 94762 N-INVAS EAR/PLS OXIMTRY CONT: CPT

## 2018-04-29 PROCEDURE — 2580000003 HC RX 258: Performed by: NURSE PRACTITIONER

## 2018-04-29 PROCEDURE — 99239 HOSP IP/OBS DSCHRG MGMT >30: CPT | Performed by: INTERNAL MEDICINE

## 2018-04-29 PROCEDURE — 6370000000 HC RX 637 (ALT 250 FOR IP): Performed by: NURSE PRACTITIONER

## 2018-04-29 RX ORDER — CHLORTHALIDONE 25 MG/1
25 TABLET ORAL DAILY
Qty: 30 TABLET | Refills: 3 | Status: SHIPPED | OUTPATIENT
Start: 2018-04-30 | End: 2018-05-27

## 2018-04-29 RX ORDER — CHLORTHALIDONE 25 MG/1
25 TABLET ORAL DAILY
Status: DISCONTINUED | OUTPATIENT
Start: 2018-04-29 | End: 2018-04-29 | Stop reason: HOSPADM

## 2018-04-29 RX ORDER — METOCLOPRAMIDE 10 MG/1
TABLET ORAL
Status: DISCONTINUED
Start: 2018-04-29 | End: 2018-04-29 | Stop reason: WASHOUT

## 2018-04-29 RX ADMIN — METOCLOPRAMIDE 10 MG: 5 INJECTION, SOLUTION INTRAMUSCULAR; INTRAVENOUS at 11:22

## 2018-04-29 RX ADMIN — AMLODIPINE BESYLATE 5 MG: 5 TABLET ORAL at 08:55

## 2018-04-29 RX ADMIN — ACETAMINOPHEN, ASPIRIN AND CAFFEINE 1 TABLET: 250; 250; 65 TABLET, FILM COATED ORAL at 00:31

## 2018-04-29 RX ADMIN — SODIUM CHLORIDE: 9 INJECTION, SOLUTION INTRAVENOUS at 04:39

## 2018-04-29 RX ADMIN — CLONIDINE HYDROCHLORIDE 0.1 MG: 0.1 TABLET ORAL at 08:55

## 2018-04-29 RX ADMIN — DIHYDROERGOTAMINE MESYLATE 0.5 MG: 1 INJECTION, SOLUTION INTRAMUSCULAR; INTRAVENOUS; SUBCUTANEOUS at 12:41

## 2018-04-29 RX ADMIN — CHLORTHALIDONE 25 MG: 25 TABLET ORAL at 08:57

## 2018-04-29 RX ADMIN — Medication 10 ML: at 08:55

## 2018-04-29 RX ADMIN — DIHYDROERGOTAMINE MESYLATE 0.5 MG: 1 INJECTION, SOLUTION INTRAMUSCULAR; INTRAVENOUS; SUBCUTANEOUS at 04:39

## 2018-04-29 RX ADMIN — ENOXAPARIN SODIUM 40 MG: 40 INJECTION SUBCUTANEOUS at 08:55

## 2018-04-29 RX ADMIN — METOCLOPRAMIDE 10 MG: 5 INJECTION, SOLUTION INTRAMUSCULAR; INTRAVENOUS at 04:38

## 2018-04-29 ASSESSMENT — PAIN DESCRIPTION - PROGRESSION
CLINICAL_PROGRESSION: GRADUALLY IMPROVING

## 2018-04-29 ASSESSMENT — PAIN SCALES - GENERAL
PAINLEVEL_OUTOF10: 5
PAINLEVEL_OUTOF10: 0
PAINLEVEL_OUTOF10: 8
PAINLEVEL_OUTOF10: 0
PAINLEVEL_OUTOF10: 0

## 2018-04-30 LAB
DIRECT EXAM: NORMAL
Lab: NORMAL
SPECIMEN DESCRIPTION: NORMAL
SPECIMEN DESCRIPTION: NORMAL
STATUS: NORMAL

## 2018-05-03 ENCOUNTER — HOSPITAL ENCOUNTER (OUTPATIENT)
Age: 43
Discharge: HOME | End: 2018-05-03
Payer: MEDICARE

## 2018-05-03 LAB
MRSA, DNA, NASAL: NORMAL
SPECIMEN DESCRIPTION: NORMAL

## 2018-05-03 PROCEDURE — 87641 MR-STAPH DNA AMP PROBE: CPT

## 2018-05-27 ENCOUNTER — HOSPITAL ENCOUNTER (EMERGENCY)
Age: 43
Discharge: HOME | End: 2018-05-27
Attending: EMERGENCY MEDICINE
Payer: MEDICARE

## 2018-05-27 VITALS
DIASTOLIC BLOOD PRESSURE: 76 MMHG | WEIGHT: 191 LBS | HEIGHT: 62 IN | SYSTOLIC BLOOD PRESSURE: 126 MMHG | OXYGEN SATURATION: 97 % | TEMPERATURE: 98.6 F | HEART RATE: 82 BPM | RESPIRATION RATE: 13 BRPM | BODY MASS INDEX: 35.15 KG/M2

## 2018-05-27 DIAGNOSIS — R51.9 CHRONIC NONINTRACTABLE HEADACHE, UNSPECIFIED HEADACHE TYPE: ICD-10-CM

## 2018-05-27 DIAGNOSIS — G89.29 CHRONIC NONINTRACTABLE HEADACHE, UNSPECIFIED HEADACHE TYPE: ICD-10-CM

## 2018-05-27 DIAGNOSIS — R11.2 NON-INTRACTABLE VOMITING WITH NAUSEA, UNSPECIFIED VOMITING TYPE: Primary | ICD-10-CM

## 2018-05-27 LAB
ABSOLUTE EOS #: 0.4 K/UL (ref 0–0.4)
ABSOLUTE IMMATURE GRANULOCYTE: ABNORMAL K/UL (ref 0–0.3)
ABSOLUTE LYMPH #: 1.9 K/UL (ref 1–4.8)
ABSOLUTE MONO #: 0.5 K/UL (ref 0.1–1.2)
ANION GAP SERPL CALCULATED.3IONS-SCNC: 15 MMOL/L (ref 9–17)
BASOPHILS # BLD: 1 % (ref 0–2)
BASOPHILS ABSOLUTE: 0.1 K/UL (ref 0–0.2)
BUN BLDV-MCNC: 11 MG/DL (ref 6–20)
BUN/CREAT BLD: NORMAL (ref 9–20)
CALCIUM SERPL-MCNC: 9.9 MG/DL (ref 8.6–10.4)
CHLORIDE BLD-SCNC: 105 MMOL/L (ref 98–107)
CO2: 21 MMOL/L (ref 20–31)
CREAT SERPL-MCNC: 0.74 MG/DL (ref 0.5–0.9)
DIFFERENTIAL TYPE: ABNORMAL
EKG ATRIAL RATE: 81 BPM
EKG P AXIS: 42 DEGREES
EKG P-R INTERVAL: 136 MS
EKG Q-T INTERVAL: 384 MS
EKG QRS DURATION: 82 MS
EKG QTC CALCULATION (BAZETT): 446 MS
EKG R AXIS: 11 DEGREES
EKG T AXIS: 14 DEGREES
EKG VENTRICULAR RATE: 81 BPM
EOSINOPHILS RELATIVE PERCENT: 7 % (ref 1–4)
GFR AFRICAN AMERICAN: >60 ML/MIN
GFR NON-AFRICAN AMERICAN: >60 ML/MIN
GFR SERPL CREATININE-BSD FRML MDRD: NORMAL ML/MIN/{1.73_M2}
GFR SERPL CREATININE-BSD FRML MDRD: NORMAL ML/MIN/{1.73_M2}
GLUCOSE BLD-MCNC: 91 MG/DL (ref 70–99)
HCT VFR BLD CALC: 38.6 % (ref 36–46)
HEMOGLOBIN: 12.9 G/DL (ref 12–16)
IMMATURE GRANULOCYTES: ABNORMAL %
LYMPHOCYTES # BLD: 30 % (ref 24–44)
MCH RBC QN AUTO: 29.3 PG (ref 26–34)
MCHC RBC AUTO-ENTMCNC: 33.5 G/DL (ref 31–37)
MCV RBC AUTO: 87.5 FL (ref 80–100)
MONOCYTES # BLD: 8 % (ref 2–11)
NRBC AUTOMATED: ABNORMAL PER 100 WBC
PDW BLD-RTO: 15.3 % (ref 12.5–15.4)
PLATELET # BLD: 290 K/UL (ref 140–450)
PLATELET ESTIMATE: ABNORMAL
PMV BLD AUTO: 8.2 FL (ref 6–12)
POTASSIUM SERPL-SCNC: 4.3 MMOL/L (ref 3.7–5.3)
RBC # BLD: 4.42 M/UL (ref 4–5.2)
RBC # BLD: ABNORMAL 10*6/UL
SEG NEUTROPHILS: 54 % (ref 36–66)
SEGMENTED NEUTROPHILS ABSOLUTE COUNT: 3.4 K/UL (ref 1.8–7.7)
SODIUM BLD-SCNC: 141 MMOL/L (ref 135–144)
TROPONIN INTERP: NORMAL
TROPONIN T: <0.03 NG/ML
WBC # BLD: 6.4 K/UL (ref 3.5–11)
WBC # BLD: ABNORMAL 10*3/UL

## 2018-05-27 PROCEDURE — 96374 THER/PROPH/DIAG INJ IV PUSH: CPT

## 2018-05-27 PROCEDURE — 80048 BASIC METABOLIC PNL TOTAL CA: CPT

## 2018-05-27 PROCEDURE — 96372 THER/PROPH/DIAG INJ SC/IM: CPT

## 2018-05-27 PROCEDURE — 6360000002 HC RX W HCPCS: Performed by: PHYSICIAN ASSISTANT

## 2018-05-27 PROCEDURE — 36415 COLL VENOUS BLD VENIPUNCTURE: CPT

## 2018-05-27 PROCEDURE — 93005 ELECTROCARDIOGRAM TRACING: CPT

## 2018-05-27 PROCEDURE — 99285 EMERGENCY DEPT VISIT HI MDM: CPT

## 2018-05-27 PROCEDURE — 6360000002 HC RX W HCPCS: Performed by: EMERGENCY MEDICINE

## 2018-05-27 PROCEDURE — 84484 ASSAY OF TROPONIN QUANT: CPT

## 2018-05-27 PROCEDURE — 96375 TX/PRO/DX INJ NEW DRUG ADDON: CPT

## 2018-05-27 PROCEDURE — 2580000003 HC RX 258: Performed by: PHYSICIAN ASSISTANT

## 2018-05-27 PROCEDURE — 85025 COMPLETE CBC W/AUTO DIFF WBC: CPT

## 2018-05-27 RX ORDER — DEXAMETHASONE SODIUM PHOSPHATE 10 MG/ML
10 INJECTION INTRAMUSCULAR; INTRAVENOUS ONCE
Status: COMPLETED | OUTPATIENT
Start: 2018-05-27 | End: 2018-05-27

## 2018-05-27 RX ORDER — PROMETHAZINE HYDROCHLORIDE 25 MG/ML
12.5 INJECTION, SOLUTION INTRAMUSCULAR; INTRAVENOUS ONCE
Status: DISCONTINUED | OUTPATIENT
Start: 2018-05-27 | End: 2018-05-27

## 2018-05-27 RX ORDER — PROMETHAZINE HYDROCHLORIDE 25 MG/1
25 TABLET ORAL EVERY 6 HOURS PRN
Qty: 20 TABLET | Refills: 0 | Status: SHIPPED | OUTPATIENT
Start: 2018-05-27 | End: 2018-06-03

## 2018-05-27 RX ORDER — PROMETHAZINE HYDROCHLORIDE 25 MG/ML
25 INJECTION, SOLUTION INTRAMUSCULAR; INTRAVENOUS ONCE
Status: COMPLETED | OUTPATIENT
Start: 2018-05-27 | End: 2018-05-27

## 2018-05-27 RX ORDER — KETOROLAC TROMETHAMINE 15 MG/ML
15 INJECTION, SOLUTION INTRAMUSCULAR; INTRAVENOUS ONCE
Status: COMPLETED | OUTPATIENT
Start: 2018-05-27 | End: 2018-05-27

## 2018-05-27 RX ORDER — 0.9 % SODIUM CHLORIDE 0.9 %
20 INTRAVENOUS SOLUTION INTRAVENOUS ONCE
Status: COMPLETED | OUTPATIENT
Start: 2018-05-27 | End: 2018-05-27

## 2018-05-27 RX ORDER — DIPHENHYDRAMINE HYDROCHLORIDE 50 MG/ML
25 INJECTION INTRAMUSCULAR; INTRAVENOUS ONCE
Status: COMPLETED | OUTPATIENT
Start: 2018-05-27 | End: 2018-05-27

## 2018-05-27 RX ADMIN — SODIUM CHLORIDE 1732 ML: 9 INJECTION, SOLUTION INTRAVENOUS at 15:30

## 2018-05-27 RX ADMIN — PROMETHAZINE HYDROCHLORIDE 25 MG: 25 INJECTION INTRAMUSCULAR; INTRAVENOUS at 15:39

## 2018-05-27 RX ADMIN — DIPHENHYDRAMINE HYDROCHLORIDE 25 MG: 50 INJECTION, SOLUTION INTRAMUSCULAR; INTRAVENOUS at 15:32

## 2018-05-27 RX ADMIN — KETOROLAC TROMETHAMINE 15 MG: 15 INJECTION, SOLUTION INTRAMUSCULAR; INTRAVENOUS at 15:32

## 2018-05-27 RX ADMIN — DEXAMETHASONE SODIUM PHOSPHATE 10 MG: 10 INJECTION INTRAMUSCULAR; INTRAVENOUS at 15:32

## 2018-05-27 ASSESSMENT — PAIN DESCRIPTION - PROGRESSION: CLINICAL_PROGRESSION: GRADUALLY IMPROVING

## 2018-05-27 ASSESSMENT — PAIN DESCRIPTION - LOCATION: LOCATION: HEAD

## 2018-05-27 ASSESSMENT — PAIN SCALES - GENERAL
PAINLEVEL_OUTOF10: 9
PAINLEVEL_OUTOF10: 6

## 2018-05-28 PROBLEM — E86.0 DEHYDRATION: Status: RESOLVED | Noted: 2018-04-26 | Resolved: 2018-05-28

## 2018-06-10 ENCOUNTER — HOSPITAL ENCOUNTER (OUTPATIENT)
Age: 43
Setting detail: OBSERVATION
Discharge: LEFT BEFORE DISCHARGE | End: 2018-06-10
Attending: EMERGENCY MEDICINE | Admitting: FAMILY MEDICINE
Payer: MEDICARE

## 2018-06-10 VITALS
SYSTOLIC BLOOD PRESSURE: 144 MMHG | TEMPERATURE: 97.9 F | WEIGHT: 213.63 LBS | RESPIRATION RATE: 18 BRPM | HEIGHT: 62 IN | DIASTOLIC BLOOD PRESSURE: 92 MMHG | HEART RATE: 86 BPM | OXYGEN SATURATION: 95 % | BODY MASS INDEX: 39.31 KG/M2

## 2018-06-10 DIAGNOSIS — R51.9 ACUTE INTRACTABLE HEADACHE, UNSPECIFIED HEADACHE TYPE: Primary | ICD-10-CM

## 2018-06-10 DIAGNOSIS — I95.9 HYPOTENSION, UNSPECIFIED HYPOTENSION TYPE: ICD-10-CM

## 2018-06-10 DIAGNOSIS — E86.0 DEHYDRATION: ICD-10-CM

## 2018-06-10 PROBLEM — G43.119: Status: ACTIVE | Noted: 2018-06-10

## 2018-06-10 LAB
-: ABNORMAL
ABSOLUTE EOS #: 0.3 K/UL (ref 0–0.4)
ABSOLUTE IMMATURE GRANULOCYTE: ABNORMAL K/UL (ref 0–0.3)
ABSOLUTE LYMPH #: 2 K/UL (ref 1–4.8)
ABSOLUTE MONO #: 0.5 K/UL (ref 0.1–1.2)
AMORPHOUS: ABNORMAL
ANION GAP SERPL CALCULATED.3IONS-SCNC: 11 MMOL/L (ref 9–17)
BACTERIA: ABNORMAL
BASOPHILS # BLD: 1 % (ref 0–2)
BASOPHILS ABSOLUTE: 0 K/UL (ref 0–0.2)
BILIRUBIN URINE: ABNORMAL
BUN BLDV-MCNC: 13 MG/DL (ref 6–20)
BUN/CREAT BLD: NORMAL (ref 9–20)
CALCIUM SERPL-MCNC: 8.7 MG/DL (ref 8.6–10.4)
CASTS UA: ABNORMAL /LPF
CHLORIDE BLD-SCNC: 105 MMOL/L (ref 98–107)
CO2: 24 MMOL/L (ref 20–31)
COLOR: YELLOW
COMMENT UA: ABNORMAL
CREAT SERPL-MCNC: 0.86 MG/DL (ref 0.5–0.9)
CRYSTALS, UA: ABNORMAL /HPF
DIFFERENTIAL TYPE: ABNORMAL
EOSINOPHILS RELATIVE PERCENT: 5 % (ref 1–4)
EPITHELIAL CELLS UA: ABNORMAL /HPF (ref 0–5)
GFR AFRICAN AMERICAN: >60 ML/MIN
GFR NON-AFRICAN AMERICAN: >60 ML/MIN
GFR SERPL CREATININE-BSD FRML MDRD: NORMAL ML/MIN/{1.73_M2}
GFR SERPL CREATININE-BSD FRML MDRD: NORMAL ML/MIN/{1.73_M2}
GLUCOSE BLD-MCNC: 97 MG/DL (ref 70–99)
GLUCOSE URINE: NEGATIVE
HCT VFR BLD CALC: 34.7 % (ref 36–46)
HEMOGLOBIN: 11.5 G/DL (ref 12–16)
IMMATURE GRANULOCYTES: ABNORMAL %
KETONES, URINE: NEGATIVE
LACTIC ACID: 1.2 MMOL/L (ref 0.5–2.2)
LEUKOCYTE ESTERASE, URINE: NEGATIVE
LYMPHOCYTES # BLD: 34 % (ref 24–44)
MCH RBC QN AUTO: 29.6 PG (ref 26–34)
MCHC RBC AUTO-ENTMCNC: 33.1 G/DL (ref 31–37)
MCV RBC AUTO: 89.4 FL (ref 80–100)
MONOCYTES # BLD: 8 % (ref 2–11)
MUCUS: ABNORMAL
NITRITE, URINE: NEGATIVE
NRBC AUTOMATED: ABNORMAL PER 100 WBC
OTHER OBSERVATIONS UA: ABNORMAL
PDW BLD-RTO: 14.7 % (ref 12.5–15.4)
PH UA: 5 (ref 5–8)
PLATELET # BLD: 248 K/UL (ref 140–450)
PLATELET ESTIMATE: ABNORMAL
PMV BLD AUTO: 8.1 FL (ref 6–12)
POTASSIUM SERPL-SCNC: 4.9 MMOL/L (ref 3.7–5.3)
PROTEIN UA: ABNORMAL
RBC # BLD: 3.89 M/UL (ref 4–5.2)
RBC # BLD: ABNORMAL 10*6/UL
RBC UA: ABNORMAL /HPF (ref 0–2)
RENAL EPITHELIAL, UA: ABNORMAL /HPF
SEG NEUTROPHILS: 52 % (ref 36–66)
SEGMENTED NEUTROPHILS ABSOLUTE COUNT: 3.2 K/UL (ref 1.8–7.7)
SODIUM BLD-SCNC: 140 MMOL/L (ref 135–144)
SPECIFIC GRAVITY UA: 1.03 (ref 1–1.03)
TRICHOMONAS: ABNORMAL
TURBIDITY: ABNORMAL
URINE HGB: NEGATIVE
UROBILINOGEN, URINE: NORMAL
WBC # BLD: 6 K/UL (ref 3.5–11)
WBC # BLD: ABNORMAL 10*3/UL
WBC UA: ABNORMAL /HPF (ref 0–5)
YEAST: ABNORMAL

## 2018-06-10 PROCEDURE — 96375 TX/PRO/DX INJ NEW DRUG ADDON: CPT

## 2018-06-10 PROCEDURE — 6360000002 HC RX W HCPCS: Performed by: EMERGENCY MEDICINE

## 2018-06-10 PROCEDURE — 6370000000 HC RX 637 (ALT 250 FOR IP): Performed by: FAMILY MEDICINE

## 2018-06-10 PROCEDURE — 83605 ASSAY OF LACTIC ACID: CPT

## 2018-06-10 PROCEDURE — 99284 EMERGENCY DEPT VISIT MOD MDM: CPT

## 2018-06-10 PROCEDURE — 81001 URINALYSIS AUTO W/SCOPE: CPT

## 2018-06-10 PROCEDURE — 2580000003 HC RX 258: Performed by: FAMILY MEDICINE

## 2018-06-10 PROCEDURE — 6360000002 HC RX W HCPCS: Performed by: FAMILY MEDICINE

## 2018-06-10 PROCEDURE — 85025 COMPLETE CBC W/AUTO DIFF WBC: CPT

## 2018-06-10 PROCEDURE — 96374 THER/PROPH/DIAG INJ IV PUSH: CPT

## 2018-06-10 PROCEDURE — 36415 COLL VENOUS BLD VENIPUNCTURE: CPT

## 2018-06-10 PROCEDURE — 87086 URINE CULTURE/COLONY COUNT: CPT

## 2018-06-10 PROCEDURE — 96372 THER/PROPH/DIAG INJ SC/IM: CPT

## 2018-06-10 PROCEDURE — 2580000003 HC RX 258: Performed by: EMERGENCY MEDICINE

## 2018-06-10 PROCEDURE — G0378 HOSPITAL OBSERVATION PER HR: HCPCS

## 2018-06-10 PROCEDURE — 80048 BASIC METABOLIC PNL TOTAL CA: CPT

## 2018-06-10 RX ORDER — ONDANSETRON 2 MG/ML
4 INJECTION INTRAMUSCULAR; INTRAVENOUS EVERY 6 HOURS PRN
Status: DISCONTINUED | OUTPATIENT
Start: 2018-06-10 | End: 2018-06-10

## 2018-06-10 RX ORDER — POTASSIUM CHLORIDE 20MEQ/15ML
40 LIQUID (ML) ORAL PRN
Status: DISCONTINUED | OUTPATIENT
Start: 2018-06-10 | End: 2018-06-10 | Stop reason: HOSPADM

## 2018-06-10 RX ORDER — 0.9 % SODIUM CHLORIDE 0.9 %
1000 INTRAVENOUS SOLUTION INTRAVENOUS ONCE
Status: COMPLETED | OUTPATIENT
Start: 2018-06-10 | End: 2018-06-10

## 2018-06-10 RX ORDER — CLONAZEPAM 1 MG/1
1 TABLET ORAL 4 TIMES DAILY
Status: DISCONTINUED | OUTPATIENT
Start: 2018-06-10 | End: 2018-06-10 | Stop reason: HOSPADM

## 2018-06-10 RX ORDER — ACETAMINOPHEN, ASPIRIN AND CAFFEINE 250; 250; 65 MG/1; MG/1; MG/1
1 TABLET, FILM COATED ORAL EVERY 6 HOURS PRN
Status: DISCONTINUED | OUTPATIENT
Start: 2018-06-10 | End: 2018-06-10 | Stop reason: HOSPADM

## 2018-06-10 RX ORDER — PROMETHAZINE HYDROCHLORIDE 12.5 MG/1
25 TABLET ORAL
COMMUNITY

## 2018-06-10 RX ORDER — SODIUM CHLORIDE 0.9 % (FLUSH) 0.9 %
10 SYRINGE (ML) INJECTION EVERY 12 HOURS SCHEDULED
Status: DISCONTINUED | OUTPATIENT
Start: 2018-06-10 | End: 2018-06-10 | Stop reason: HOSPADM

## 2018-06-10 RX ORDER — MAGNESIUM SULFATE 1 G/100ML
1 INJECTION INTRAVENOUS PRN
Status: DISCONTINUED | OUTPATIENT
Start: 2018-06-10 | End: 2018-06-10 | Stop reason: HOSPADM

## 2018-06-10 RX ORDER — SODIUM CHLORIDE, SODIUM LACTATE, POTASSIUM CHLORIDE, CALCIUM CHLORIDE 600; 310; 30; 20 MG/100ML; MG/100ML; MG/100ML; MG/100ML
INJECTION, SOLUTION INTRAVENOUS CONTINUOUS
Status: DISCONTINUED | OUTPATIENT
Start: 2018-06-10 | End: 2018-06-10 | Stop reason: HOSPADM

## 2018-06-10 RX ORDER — ATORVASTATIN CALCIUM 10 MG/1
10 TABLET, FILM COATED ORAL DAILY
Status: DISCONTINUED | OUTPATIENT
Start: 2018-06-10 | End: 2018-06-10 | Stop reason: HOSPADM

## 2018-06-10 RX ORDER — PROMETHAZINE HYDROCHLORIDE 25 MG/ML
12.5 INJECTION, SOLUTION INTRAMUSCULAR; INTRAVENOUS EVERY 6 HOURS PRN
Status: DISCONTINUED | OUTPATIENT
Start: 2018-06-10 | End: 2018-06-10 | Stop reason: HOSPADM

## 2018-06-10 RX ORDER — DIHYDROERGOTAMINE MESYLATE 1 MG/ML
1 INJECTION, SOLUTION INTRAMUSCULAR; INTRAVENOUS; SUBCUTANEOUS ONCE
Status: COMPLETED | OUTPATIENT
Start: 2018-06-10 | End: 2018-06-10

## 2018-06-10 RX ORDER — POTASSIUM CHLORIDE 7.45 MG/ML
10 INJECTION INTRAVENOUS PRN
Status: DISCONTINUED | OUTPATIENT
Start: 2018-06-10 | End: 2018-06-10 | Stop reason: HOSPADM

## 2018-06-10 RX ORDER — PROMETHAZINE HYDROCHLORIDE 25 MG/ML
6.25 INJECTION, SOLUTION INTRAMUSCULAR; INTRAVENOUS ONCE
Status: COMPLETED | OUTPATIENT
Start: 2018-06-10 | End: 2018-06-10

## 2018-06-10 RX ORDER — KETOROLAC TROMETHAMINE 30 MG/ML
30 INJECTION, SOLUTION INTRAMUSCULAR; INTRAVENOUS ONCE
Status: COMPLETED | OUTPATIENT
Start: 2018-06-10 | End: 2018-06-10

## 2018-06-10 RX ORDER — SODIUM CHLORIDE 0.9 % (FLUSH) 0.9 %
10 SYRINGE (ML) INJECTION PRN
Status: DISCONTINUED | OUTPATIENT
Start: 2018-06-10 | End: 2018-06-10 | Stop reason: HOSPADM

## 2018-06-10 RX ORDER — NICOTINE 21 MG/24HR
1 PATCH, TRANSDERMAL 24 HOURS TRANSDERMAL DAILY PRN
Status: DISCONTINUED | OUTPATIENT
Start: 2018-06-10 | End: 2018-06-10 | Stop reason: HOSPADM

## 2018-06-10 RX ORDER — ATORVASTATIN CALCIUM 10 MG/1
10 TABLET, FILM COATED ORAL DAILY
COMMUNITY
End: 2018-07-26 | Stop reason: ALTCHOICE

## 2018-06-10 RX ORDER — DEXAMETHASONE SODIUM PHOSPHATE 10 MG/ML
10 INJECTION INTRAMUSCULAR; INTRAVENOUS ONCE
Status: COMPLETED | OUTPATIENT
Start: 2018-06-10 | End: 2018-06-10

## 2018-06-10 RX ORDER — PROMETHAZINE HYDROCHLORIDE 25 MG/ML
12.5 INJECTION, SOLUTION INTRAMUSCULAR; INTRAVENOUS ONCE
Status: COMPLETED | OUTPATIENT
Start: 2018-06-10 | End: 2018-06-10

## 2018-06-10 RX ORDER — BISACODYL 10 MG
10 SUPPOSITORY, RECTAL RECTAL DAILY PRN
Status: DISCONTINUED | OUTPATIENT
Start: 2018-06-10 | End: 2018-06-10 | Stop reason: HOSPADM

## 2018-06-10 RX ORDER — DIPHENHYDRAMINE HYDROCHLORIDE 50 MG/ML
25 INJECTION INTRAMUSCULAR; INTRAVENOUS ONCE
Status: COMPLETED | OUTPATIENT
Start: 2018-06-10 | End: 2018-06-10

## 2018-06-10 RX ORDER — POTASSIUM CHLORIDE 20 MEQ/1
40 TABLET, EXTENDED RELEASE ORAL PRN
Status: DISCONTINUED | OUTPATIENT
Start: 2018-06-10 | End: 2018-06-10 | Stop reason: HOSPADM

## 2018-06-10 RX ADMIN — CLONAZEPAM 1 MG: 1 TABLET ORAL at 17:28

## 2018-06-10 RX ADMIN — ATORVASTATIN CALCIUM 10 MG: 10 TABLET, FILM COATED ORAL at 17:28

## 2018-06-10 RX ADMIN — SODIUM CHLORIDE, POTASSIUM CHLORIDE, SODIUM LACTATE AND CALCIUM CHLORIDE: 600; 310; 30; 20 INJECTION, SOLUTION INTRAVENOUS at 17:30

## 2018-06-10 RX ADMIN — DIPHENHYDRAMINE HYDROCHLORIDE 25 MG: 50 INJECTION, SOLUTION INTRAMUSCULAR; INTRAVENOUS at 11:16

## 2018-06-10 RX ADMIN — SODIUM CHLORIDE 1000 ML: 9 INJECTION, SOLUTION INTRAVENOUS at 12:53

## 2018-06-10 RX ADMIN — DEXAMETHASONE SODIUM PHOSPHATE 10 MG: 10 INJECTION INTRAMUSCULAR; INTRAVENOUS at 11:17

## 2018-06-10 RX ADMIN — PROMETHAZINE HYDROCHLORIDE 12.5 MG: 25 INJECTION INTRAMUSCULAR; INTRAVENOUS at 12:59

## 2018-06-10 RX ADMIN — DIHYDROERGOTAMINE MESYLATE 1 MG: 1 INJECTION, SOLUTION INTRAMUSCULAR; INTRAVENOUS; SUBCUTANEOUS at 12:59

## 2018-06-10 RX ADMIN — ENOXAPARIN SODIUM 40 MG: 40 INJECTION SUBCUTANEOUS at 17:28

## 2018-06-10 RX ADMIN — PROMETHAZINE HYDROCHLORIDE 6.25 MG: 25 INJECTION INTRAMUSCULAR; INTRAVENOUS at 11:16

## 2018-06-10 RX ADMIN — KETOROLAC TROMETHAMINE 30 MG: 30 INJECTION, SOLUTION INTRAMUSCULAR; INTRAVENOUS at 11:17

## 2018-06-10 RX ADMIN — SODIUM CHLORIDE 1000 ML: 9 INJECTION, SOLUTION INTRAVENOUS at 11:18

## 2018-06-10 ASSESSMENT — PAIN DESCRIPTION - ORIENTATION: ORIENTATION: RIGHT;ANTERIOR

## 2018-06-10 ASSESSMENT — PAIN DESCRIPTION - PAIN TYPE
TYPE: ACUTE PAIN
TYPE: ACUTE PAIN
TYPE: ACUTE PAIN;CHRONIC PAIN
TYPE: ACUTE PAIN
TYPE: ACUTE PAIN;CHRONIC PAIN

## 2018-06-10 ASSESSMENT — PAIN SCALES - GENERAL
PAINLEVEL_OUTOF10: 9
PAINLEVEL_OUTOF10: 9
PAINLEVEL_OUTOF10: 10
PAINLEVEL_OUTOF10: 9
PAINLEVEL_OUTOF10: 7
PAINLEVEL_OUTOF10: 9

## 2018-06-10 ASSESSMENT — PAIN DESCRIPTION - LOCATION
LOCATION: HEAD
LOCATION: HEAD;LEG
LOCATION: HEAD;LEG;NECK
LOCATION: HEAD;LEG;NECK
LOCATION: HEAD;LEG

## 2018-06-10 ASSESSMENT — PAIN DESCRIPTION - DESCRIPTORS
DESCRIPTORS: ACHING;HEADACHE
DESCRIPTORS: HEADACHE
DESCRIPTORS: CONSTANT
DESCRIPTORS: HEADACHE

## 2018-06-10 ASSESSMENT — PAIN DESCRIPTION - PROGRESSION
CLINICAL_PROGRESSION: NOT CHANGED
CLINICAL_PROGRESSION: NOT CHANGED

## 2018-06-17 ENCOUNTER — HOSPITAL ENCOUNTER (EMERGENCY)
Age: 43
Discharge: HOME | End: 2018-06-17
Attending: EMERGENCY MEDICINE
Payer: MEDICARE

## 2018-06-17 VITALS
SYSTOLIC BLOOD PRESSURE: 136 MMHG | BODY MASS INDEX: 35.15 KG/M2 | HEIGHT: 62 IN | HEART RATE: 91 BPM | TEMPERATURE: 97.9 F | WEIGHT: 191 LBS | OXYGEN SATURATION: 99 % | RESPIRATION RATE: 14 BRPM | DIASTOLIC BLOOD PRESSURE: 92 MMHG

## 2018-06-17 DIAGNOSIS — G43.009 MIGRAINE WITHOUT AURA AND WITHOUT STATUS MIGRAINOSUS, NOT INTRACTABLE: Primary | ICD-10-CM

## 2018-06-17 PROCEDURE — 96374 THER/PROPH/DIAG INJ IV PUSH: CPT

## 2018-06-17 PROCEDURE — 96375 TX/PRO/DX INJ NEW DRUG ADDON: CPT

## 2018-06-17 PROCEDURE — 2580000003 HC RX 258: Performed by: EMERGENCY MEDICINE

## 2018-06-17 PROCEDURE — 6360000002 HC RX W HCPCS: Performed by: EMERGENCY MEDICINE

## 2018-06-17 PROCEDURE — 99283 EMERGENCY DEPT VISIT LOW MDM: CPT

## 2018-06-17 RX ORDER — METOCLOPRAMIDE HYDROCHLORIDE 5 MG/ML
10 INJECTION INTRAMUSCULAR; INTRAVENOUS ONCE
Status: COMPLETED | OUTPATIENT
Start: 2018-06-17 | End: 2018-06-17

## 2018-06-17 RX ORDER — METHYLPREDNISOLONE SODIUM SUCCINATE 125 MG/2ML
125 INJECTION, POWDER, LYOPHILIZED, FOR SOLUTION INTRAMUSCULAR; INTRAVENOUS ONCE
Status: COMPLETED | OUTPATIENT
Start: 2018-06-17 | End: 2018-06-17

## 2018-06-17 RX ORDER — SUMATRIPTAN 50 MG/1
50 TABLET, FILM COATED ORAL ONCE
Status: DISCONTINUED | OUTPATIENT
Start: 2018-06-17 | End: 2018-06-17

## 2018-06-17 RX ORDER — DIPHENHYDRAMINE HYDROCHLORIDE 50 MG/ML
25 INJECTION INTRAMUSCULAR; INTRAVENOUS ONCE
Status: COMPLETED | OUTPATIENT
Start: 2018-06-17 | End: 2018-06-17

## 2018-06-17 RX ORDER — RIZATRIPTAN BENZOATE 5 MG/1
10 TABLET, ORALLY DISINTEGRATING ORAL ONCE
Status: DISCONTINUED | OUTPATIENT
Start: 2018-06-17 | End: 2018-06-17 | Stop reason: HOSPADM

## 2018-06-17 RX ORDER — 0.9 % SODIUM CHLORIDE 0.9 %
1000 INTRAVENOUS SOLUTION INTRAVENOUS ONCE
Status: COMPLETED | OUTPATIENT
Start: 2018-06-17 | End: 2018-06-17

## 2018-06-17 RX ORDER — KETOROLAC TROMETHAMINE 30 MG/ML
30 INJECTION, SOLUTION INTRAMUSCULAR; INTRAVENOUS ONCE
Status: COMPLETED | OUTPATIENT
Start: 2018-06-17 | End: 2018-06-17

## 2018-06-17 RX ADMIN — SODIUM CHLORIDE 1000 ML: 9 INJECTION, SOLUTION INTRAVENOUS at 11:46

## 2018-06-17 RX ADMIN — KETOROLAC TROMETHAMINE 30 MG: 30 INJECTION, SOLUTION INTRAMUSCULAR at 11:46

## 2018-06-17 RX ADMIN — DIPHENHYDRAMINE HYDROCHLORIDE 25 MG: 50 INJECTION, SOLUTION INTRAMUSCULAR; INTRAVENOUS at 11:46

## 2018-06-17 RX ADMIN — METHYLPREDNISOLONE SODIUM SUCCINATE 125 MG: 125 INJECTION, POWDER, FOR SOLUTION INTRAMUSCULAR; INTRAVENOUS at 11:46

## 2018-06-17 RX ADMIN — METOCLOPRAMIDE 10 MG: 5 INJECTION, SOLUTION INTRAMUSCULAR; INTRAVENOUS at 11:46

## 2018-06-17 ASSESSMENT — ENCOUNTER SYMPTOMS
ABDOMINAL PAIN: 0
CONSTIPATION: 0
RHINORRHEA: 0
PHOTOPHOBIA: 1
DIARRHEA: 0
BACK PAIN: 0
NAUSEA: 1
VOMITING: 1
SORE THROAT: 0
BLOOD IN STOOL: 0
CHEST TIGHTNESS: 0
SHORTNESS OF BREATH: 0

## 2018-06-17 ASSESSMENT — PAIN DESCRIPTION - PROGRESSION: CLINICAL_PROGRESSION: GRADUALLY WORSENING

## 2018-06-17 ASSESSMENT — PAIN DESCRIPTION - PAIN TYPE: TYPE: ACUTE PAIN

## 2018-06-17 ASSESSMENT — PAIN SCALES - GENERAL
PAINLEVEL_OUTOF10: 9
PAINLEVEL_OUTOF10: 9

## 2018-06-17 ASSESSMENT — PAIN DESCRIPTION - DESCRIPTORS: DESCRIPTORS: SHARP

## 2018-06-17 ASSESSMENT — PAIN DESCRIPTION - ONSET: ONSET: ON-GOING

## 2018-06-17 ASSESSMENT — PAIN DESCRIPTION - LOCATION: LOCATION: HEAD

## 2018-07-26 ENCOUNTER — APPOINTMENT (OUTPATIENT)
Dept: GENERAL RADIOLOGY | Age: 43
End: 2018-07-26
Payer: MEDICARE

## 2018-07-26 ENCOUNTER — HOSPITAL ENCOUNTER (EMERGENCY)
Age: 43
Discharge: HOME | End: 2018-07-26
Attending: SPECIALIST
Payer: MEDICARE

## 2018-07-26 ENCOUNTER — APPOINTMENT (OUTPATIENT)
Dept: ULTRASOUND IMAGING | Age: 43
End: 2018-07-26
Payer: MEDICARE

## 2018-07-26 VITALS
BODY MASS INDEX: 34.41 KG/M2 | RESPIRATION RATE: 14 BRPM | OXYGEN SATURATION: 97 % | WEIGHT: 187 LBS | SYSTOLIC BLOOD PRESSURE: 148 MMHG | DIASTOLIC BLOOD PRESSURE: 87 MMHG | TEMPERATURE: 98.8 F | HEIGHT: 62 IN | HEART RATE: 98 BPM

## 2018-07-26 DIAGNOSIS — M79.662 PAIN IN BOTH LOWER LEGS: Primary | ICD-10-CM

## 2018-07-26 DIAGNOSIS — M79.661 PAIN IN BOTH LOWER LEGS: Primary | ICD-10-CM

## 2018-07-26 PROCEDURE — 93971 EXTREMITY STUDY: CPT

## 2018-07-26 PROCEDURE — 73590 X-RAY EXAM OF LOWER LEG: CPT

## 2018-07-26 PROCEDURE — 99284 EMERGENCY DEPT VISIT MOD MDM: CPT

## 2018-07-26 RX ORDER — RIZATRIPTAN BENZOATE 10 MG/1
TABLET ORAL
COMMUNITY
Start: 2018-04-18

## 2018-07-26 ASSESSMENT — ENCOUNTER SYMPTOMS: COLOR CHANGE: 1

## 2018-07-26 ASSESSMENT — PAIN DESCRIPTION - ORIENTATION: ORIENTATION: RIGHT;LEFT

## 2018-07-26 ASSESSMENT — PAIN DESCRIPTION - DESCRIPTORS: DESCRIPTORS: ACHING

## 2018-07-26 ASSESSMENT — PAIN DESCRIPTION - LOCATION: LOCATION: LEG

## 2018-07-26 ASSESSMENT — PAIN SCALES - GENERAL: PAINLEVEL_OUTOF10: 8

## 2018-07-26 NOTE — ED TRIAGE NOTES
Pt to exam room with c/o bilateral leg pain for 2 months. Pt reports that her legs sometimes get swollen at the end of the day but swelling resolves after she's off her feet. Pain at 8/10, 0 pain meds prior to arrival.pt denies known injury or falls.

## 2018-07-26 NOTE — ED PROVIDER NOTES
[facility]  eMERGENCY dEPARTMENT eNCOUnter      Pt Name: Tena White  MRN: [id number]  [place] 1975  Date of evaluation: 7/26/18      CHIEF COMPLAINT       Chief Complaint   Patient presents with    Leg Pain     bilateral for 2 months         HISTORY OF PRESENT ILLNESS    Tena White is a 38 y.o. female who presents To the emergency department complaining of bilateral leg pain from knees down for last 2 months. Patient states her legs sometimes gets swollen at the end of the day but swelling resolves after she is off her feet. She also has noticed intermittent redness in bilateral lower legs. She describes pain as a dull aching and burning in sensation 8 out of 10 in intensity. There are no exacerbating or relieving factors and patient has taken ibuprofen and the applied Biofreeze and the icy hot without any relief of the pain. She has not seen her primary care physician for last 2 months. She has tried calling the office and her appointments have been postponed. She denies any fall or injuries and denies any fever or chills. She denies any tingling, numbness or weakness distally. She denies any chest pain or shortness of breath. She denies any recent long travels or prolonged immobilization and no history of DVT or PE in the past.       REVIEW OF SYSTEMS       Review of Systems   Constitutional: Negative for chills and fever. Cardiovascular: Positive for leg swelling. Musculoskeletal: Positive for myalgias. Skin: Positive for color change. Neurological: Negative for weakness and numbness. All other systems reviewed and are negative. PAST MEDICAL HISTORY    has a past medical history of Anxiety; Bladder disorder; CMT (Charcot-Melissa-Tooth disease); Depression; GERD (gastroesophageal reflux disease); Hypertension; Migraines; Neuropathy ([facility] [address] Neuropathy involving both lower extremities; and Psychiatric problem.     SURGICAL HISTORY      has a past surgical history that includes Bladder surgery; Neck surgery (2012); Hysterectomy; Cholecystectomy; Knee arthroscopy (Bilateral); back surgery; Colonoscopy; Endoscopy, colon, diagnostic; and Dilatation, esophagus. CURRENT MEDICATIONS       Discharge Medication List as of 2018 11:21 AM      CONTINUE these medications which have NOT CHANGED    Details   rizatriptan (MAXALT) 10 MG tablet Historical Med      promethazine (PHENERGAN) 12.5 MG tablet Take 12.5 mg by mouthHistorical Med      clonazePAM (KLONOPIN) 1 MG tablet Take 1 mg by mouth 4 times daily. Linford Yue Historical Med      tiZANidine (ZANAFLEX) 4 MG tablet Take 4 mg by mouth every 6 hours as neededHistorical Med             ALLERGIES     is allergic to latex; azithromycin; lamictal [lamotrigine]; pcn [penicillins]; compazine [prochlorperazine maleate]; buspar [buspirone]; and zofran [ondansetron hcl]. FAMILY HISTORY     indicated that her mother is alive. She indicated that her father is . She indicated that her brother is alive. She reported the following about her maternal grandmother: has CMT syndrome. She indicated that the status of her other is unknown.      family history includes Cancer in her father; Coronary Art Dis in her father; Mental Illness in her father; Parkinsonism in an other family member; Stroke in her father. SOCIAL HISTORY      reports that she has never smoked. She has never used smokeless tobacco. She reports that she does not drink alcohol or use drugs. PHYSICAL EXAM     INITIAL VITALS:  height is 5' 2\" (1.575 m) and weight is 84.8 kg (187 lb). Her temperature is 98.8 °F (37.1 °C). Her blood pressure is 148/87 (abnormal) and her pulse is 98. Her respiration is 14 and oxygen saturation is 97%. Physical Exam   Constitutional: She is oriented to person, place, and time. She appears well-developed and well-nourished. HENT:   Head: Normocephalic and atraumatic.    Nose: Nose normal.   Mouth/Throat: Oropharynx is clear and moist. tibia/ fibula: There is no evidence of acute fracture. There is normal alignment. No acute joint abnormality. No focal osseous lesion. No focal soft tissue abnormality. Negative examinations of the bilateral tibia/fibula with no acute osseous abnormality. Xr Tibia Fibula Right (2 Views)    Result Date: 7/26/2018  EXAMINATION: TWO XRAY VIEWS OF THE RIGHT TIBIA AND FIBULA; 2 XRAY VIEWS OF THE LEFT TIBIA AND FIBULA 7/26/2018 10:52 am COMPARISON: None. HISTORY: ORDERING SYSTEM PROVIDED HISTORY: pain TECHNOLOGIST PROVIDED HISTORY: Reason for exam:->pain Ordering Physician Provided Reason for Exam: Pt states has pain all over both lower legs with no known injury for months Acuity: Chronic Type of Exam: Initial FINDINGS: Right tibia/ fibula: There is no evidence of acute fracture. There is normal alignment. No acute joint abnormality. No focal osseous lesion. No focal soft tissue abnormality. Left tibia/ fibula: There is no evidence of acute fracture. There is normal alignment. No acute joint abnormality. No focal osseous lesion. No focal soft tissue abnormality. Negative examinations of the bilateral tibia/fibula with no acute osseous abnormality. Us Dup Lower Extremity Left Cosmo    Result Date: 7/26/2018  EXAMINATION: DUPLEX VENOUS ULTRASOUND OF THE LEFT LOWER EXTREMITY 7/26/2018 10:00 am TECHNIQUE: Duplex ultrasound and Doppler images were obtained of the left lower extremity. COMPARISON: None. HISTORY: ORDERING SYSTEM PROVIDED HISTORY: Pain TECHNOLOGIST PROVIDED HISTORY: Ordering Physician Provided Reason for Exam: Bilateral leg pain x 2 mos Acuity: Chronic Type of Exam: Initial FINDINGS: The visualized veins of the left lower extremity are patent and free of echogenic thrombus. The veins are normally compressible and have normal phasic flow. No evidence of DVT in the left lower extremity.      Us Dup Lower Extremity Right Cosmo    Result Date: 7/26/2018  EXAMINATION: DUPLEX VENOUS

## 2018-08-02 ENCOUNTER — HOSPITAL ENCOUNTER (OUTPATIENT)
Age: 43
Discharge: HOME | End: 2018-08-02
Payer: MEDICARE

## 2018-08-02 PROCEDURE — 87641 MR-STAPH DNA AMP PROBE: CPT

## 2018-08-03 LAB
MRSA, DNA, NASAL: NORMAL
SPECIMEN DESCRIPTION: NORMAL

## 2018-08-20 ENCOUNTER — HOSPITAL ENCOUNTER (EMERGENCY)
Age: 43
Discharge: HOME | End: 2018-08-20
Attending: EMERGENCY MEDICINE
Payer: MEDICARE

## 2018-08-20 VITALS
BODY MASS INDEX: 34.41 KG/M2 | HEART RATE: 85 BPM | TEMPERATURE: 97.7 F | OXYGEN SATURATION: 94 % | RESPIRATION RATE: 12 BRPM | SYSTOLIC BLOOD PRESSURE: 167 MMHG | DIASTOLIC BLOOD PRESSURE: 108 MMHG | WEIGHT: 187 LBS | HEIGHT: 62 IN

## 2018-08-20 DIAGNOSIS — G89.29 CHRONIC NONINTRACTABLE HEADACHE, UNSPECIFIED HEADACHE TYPE: Primary | ICD-10-CM

## 2018-08-20 DIAGNOSIS — R51.9 CHRONIC NONINTRACTABLE HEADACHE, UNSPECIFIED HEADACHE TYPE: Primary | ICD-10-CM

## 2018-08-20 PROCEDURE — 96374 THER/PROPH/DIAG INJ IV PUSH: CPT

## 2018-08-20 PROCEDURE — 6360000002 HC RX W HCPCS: Performed by: PHYSICIAN ASSISTANT

## 2018-08-20 PROCEDURE — 99283 EMERGENCY DEPT VISIT LOW MDM: CPT

## 2018-08-20 PROCEDURE — 2580000003 HC RX 258: Performed by: PHYSICIAN ASSISTANT

## 2018-08-20 PROCEDURE — 96375 TX/PRO/DX INJ NEW DRUG ADDON: CPT

## 2018-08-20 RX ORDER — CLINDAMYCIN HYDROCHLORIDE 300 MG/1
300 CAPSULE ORAL 3 TIMES DAILY
COMMUNITY
End: 2018-10-17 | Stop reason: ALTCHOICE

## 2018-08-20 RX ORDER — DIPHENHYDRAMINE HYDROCHLORIDE 50 MG/ML
25 INJECTION INTRAMUSCULAR; INTRAVENOUS ONCE
Status: COMPLETED | OUTPATIENT
Start: 2018-08-20 | End: 2018-08-20

## 2018-08-20 RX ORDER — HYDROCODONE BITARTRATE AND ACETAMINOPHEN 5; 325 MG/1; MG/1
1 TABLET ORAL EVERY 6 HOURS PRN
COMMUNITY
End: 2018-10-17 | Stop reason: ALTCHOICE

## 2018-08-20 RX ORDER — 0.9 % SODIUM CHLORIDE 0.9 %
1000 INTRAVENOUS SOLUTION INTRAVENOUS ONCE
Status: COMPLETED | OUTPATIENT
Start: 2018-08-20 | End: 2018-08-20

## 2018-08-20 RX ORDER — KETOROLAC TROMETHAMINE 30 MG/ML
30 INJECTION, SOLUTION INTRAMUSCULAR; INTRAVENOUS ONCE
Status: COMPLETED | OUTPATIENT
Start: 2018-08-20 | End: 2018-08-20

## 2018-08-20 RX ORDER — PROMETHAZINE HYDROCHLORIDE 25 MG/ML
12.5 INJECTION, SOLUTION INTRAMUSCULAR; INTRAVENOUS ONCE
Status: COMPLETED | OUTPATIENT
Start: 2018-08-20 | End: 2018-08-20

## 2018-08-20 RX ORDER — DEXAMETHASONE SODIUM PHOSPHATE 10 MG/ML
10 INJECTION INTRAMUSCULAR; INTRAVENOUS ONCE
Status: COMPLETED | OUTPATIENT
Start: 2018-08-20 | End: 2018-08-20

## 2018-08-20 RX ORDER — DIHYDROERGOTAMINE MESYLATE 1 MG/ML
1 INJECTION, SOLUTION INTRAMUSCULAR; INTRAVENOUS; SUBCUTANEOUS ONCE
COMMUNITY
End: 2019-02-06 | Stop reason: DRUGHIGH

## 2018-08-20 RX ORDER — NALBUPHINE HCL 10 MG/ML
10 AMPUL (ML) INJECTION ONCE
Status: COMPLETED | OUTPATIENT
Start: 2018-08-20 | End: 2018-08-20

## 2018-08-20 RX ADMIN — DEXAMETHASONE SODIUM PHOSPHATE 10 MG: 10 INJECTION INTRAMUSCULAR; INTRAVENOUS at 16:52

## 2018-08-20 RX ADMIN — NALBUPHINE HYDROCHLORIDE 10 MG: 10 INJECTION, SOLUTION INTRAMUSCULAR; INTRAVENOUS; SUBCUTANEOUS at 18:00

## 2018-08-20 RX ADMIN — DIPHENHYDRAMINE HYDROCHLORIDE 25 MG: 50 INJECTION, SOLUTION INTRAMUSCULAR; INTRAVENOUS at 16:54

## 2018-08-20 RX ADMIN — PROMETHAZINE HYDROCHLORIDE 12.5 MG: 25 INJECTION INTRAMUSCULAR; INTRAVENOUS at 16:59

## 2018-08-20 RX ADMIN — KETOROLAC TROMETHAMINE 30 MG: 30 INJECTION, SOLUTION INTRAMUSCULAR at 16:56

## 2018-08-20 RX ADMIN — SODIUM CHLORIDE 1000 ML: 9 INJECTION, SOLUTION INTRAVENOUS at 16:51

## 2018-08-20 ASSESSMENT — PAIN SCALES - GENERAL
PAINLEVEL_OUTOF10: 9

## 2018-08-20 ASSESSMENT — PAIN DESCRIPTION - DESCRIPTORS: DESCRIPTORS: SHARP

## 2018-08-20 ASSESSMENT — PAIN DESCRIPTION - LOCATION
LOCATION: HEAD
LOCATION: HEAD

## 2018-08-20 ASSESSMENT — PAIN DESCRIPTION - PROGRESSION: CLINICAL_PROGRESSION: NOT CHANGED

## 2018-08-20 ASSESSMENT — PAIN DESCRIPTION - FREQUENCY: FREQUENCY: CONTINUOUS

## 2018-08-20 ASSESSMENT — PAIN DESCRIPTION - PAIN TYPE: TYPE: ACUTE PAIN

## 2018-08-20 ASSESSMENT — PAIN DESCRIPTION - ORIENTATION: ORIENTATION: RIGHT

## 2018-08-20 NOTE — ED PROVIDER NOTES
history that includes Bladder surgery; Neck surgery (08/2012); Hysterectomy; Cholecystectomy; Knee arthroscopy (Bilateral); back surgery; Colonoscopy; Endoscopy, colon, diagnostic; Dilatation, esophagus; and Cervical spine surgery. Social History:  reports that she has never smoked. She has never used smokeless tobacco. She reports that she does not drink alcohol or use drugs. Family History: Noncontributory   Psychiatric History: Noncontributory     Allergies:is allergic to latex; azithromycin; lamictal [lamotrigine]; pcn [penicillins]; compazine [prochlorperazine maleate]; buspar [buspirone]; and zofran [ondansetron hcl]. PHYSICAL EXAM     INITIAL VITALS: BP (!) 150/103   Pulse 84   Temp 97.7 °F (36.5 °C) (Oral)   Resp 14   Ht 5' 2\" (1.575 m)   Wt 84.8 kg (187 lb)   SpO2 98%   BMI 34.20 kg/m²     Constitutional:  Well developed, A&O x 3  Eyes:  Pupils equal and readily reactive to light at 3 mm bilaterally  HENT:  Atraumatic, external ears normal, nose normal, oropharynx moist.  No tenderness to palpation of the temporal arteries. Neck:  No midline pain. Full range of motion. Supple with no meningismus. Respiratory:  Clear to auscultation bilaterally with good air exchange, no W/R/R  Cardiovascular:  RRR with normal S1 and S2  GI:  Soft, nondistended/normal BS, and nontender   Musculoskeletal:  No edema, no tenderness, no deformities, full ROM x 4 and NV intact distally  Back:  No midline tenderness or pain with range of motion. No CVA tenderness. Integument:  No rash. Neuro examination:   Bilateral Upper and Lower extremities with 5/5 strength both proximally and distally, and intact sensation to light touch.           EMERGENCY DEPARTMENT COURSE:     Orders Placed This Encounter   Medications    0.9 % sodium chloride bolus    dexamethasone (DECADRON) injection 10 mg    promethazine (PHENERGAN) injection 12.5 mg    diphenhydrAMINE (BENADRYL) injection 25 mg    ketorolac (TORADOL) injection 30 mg    nalbuphine (NUBAIN) injection 10 mg     1632  Saline bolus, non-narcotic meds. Nontoxic, AVSS.   1800  Nubain ordered as no change in her level of symptoms. Flat affect but no pain distress on this or my initial examination. 1926  Pt states some improvement after Nubain. I informed her that we can't keep treating with narcotics as this is chronic in nature. I inquired about her recent AMA shortly after arriving/getting settled into her room here at [facility], she states they weren't going to give her what she needed for her pain so she left. She is in no pain distress for me. There is nothing by history or exam that is worrisome for an acute process that warrants additional labs/imaging or admitting. She states she will go home and try her intra-nasal DHE, she hopes this will work better now that she has had the other meds here. She has Phenergan at home still. I have reviewed the disposition diagnosis with the patient and or their family/guardian. I have answered their questions and given discharge instructions. They voiced understanding of these instructions and did not have any further questions or complaints. Return to ED for worsening symptoms or other concerns. DIAGNOSTIC RESULTS     EKG: All EKG's are interpreted by the Emergency Department Physician who either signs or Co-signs this chart in the absence of a cardiologist.  Not indicated    RADIOLOGY:   Non-plain film images such as CT, Ultrasound and MRI are read by the radiologist. Plain radiographic images are visualized and preliminarily interpreted by the emergency physician with the below findings:    Not indicated    Interpretation per the Radiologist below, if available at the time of this note:    No orders to display       LABS:  Labs Reviewed - No data to display  Not indicated    All other labs were within normal range or not returned as of this dictation. FINAL IMPRESSION:     1.  Chronic nonintractable headache, unspecified headache type          DISPOSITION:  DISPOSITION          PATIENT REFERRED TO:  DO Donna Ortega [address]  [phone]    Schedule an appointment as soon as possible for a visit in 2 days  for re-evaluation of your symptoms      DISCHARGE MEDICATIONS:  New Prescriptions    No medications on file       (Please note that portions of this note were completed with a voice recognition program.  Efforts were made to edit the dictations but occasionally words are mis-transcribed.)        Magi Izquierdo PA-C  08/20/18 1537

## 2018-10-17 ENCOUNTER — HOSPITAL ENCOUNTER (EMERGENCY)
Age: 43
Discharge: HOME | End: 2018-10-17
Attending: EMERGENCY MEDICINE
Payer: MEDICARE

## 2018-10-17 VITALS
BODY MASS INDEX: 33.68 KG/M2 | RESPIRATION RATE: 18 BRPM | SYSTOLIC BLOOD PRESSURE: 120 MMHG | OXYGEN SATURATION: 97 % | DIASTOLIC BLOOD PRESSURE: 84 MMHG | HEART RATE: 86 BPM | WEIGHT: 183 LBS | HEIGHT: 62 IN | TEMPERATURE: 97.5 F

## 2018-10-17 DIAGNOSIS — R51.9 NONINTRACTABLE EPISODIC HEADACHE, UNSPECIFIED HEADACHE TYPE: Primary | ICD-10-CM

## 2018-10-17 PROCEDURE — 99283 EMERGENCY DEPT VISIT LOW MDM: CPT

## 2018-10-17 PROCEDURE — 6370000000 HC RX 637 (ALT 250 FOR IP): Performed by: EMERGENCY MEDICINE

## 2018-10-17 PROCEDURE — 96372 THER/PROPH/DIAG INJ SC/IM: CPT

## 2018-10-17 PROCEDURE — 6360000002 HC RX W HCPCS: Performed by: EMERGENCY MEDICINE

## 2018-10-17 RX ORDER — PROMETHAZINE HYDROCHLORIDE 25 MG/ML
12.5 INJECTION, SOLUTION INTRAMUSCULAR; INTRAVENOUS ONCE
Status: COMPLETED | OUTPATIENT
Start: 2018-10-17 | End: 2018-10-17

## 2018-10-17 RX ORDER — DEXAMETHASONE SODIUM PHOSPHATE 10 MG/ML
10 INJECTION INTRAMUSCULAR; INTRAVENOUS ONCE
Status: COMPLETED | OUTPATIENT
Start: 2018-10-17 | End: 2018-10-17

## 2018-10-17 RX ORDER — DIPHENHYDRAMINE HCL 25 MG
25 TABLET ORAL ONCE
Status: COMPLETED | OUTPATIENT
Start: 2018-10-17 | End: 2018-10-17

## 2018-10-17 RX ORDER — DIPHENHYDRAMINE HYDROCHLORIDE 50 MG/ML
25 INJECTION INTRAMUSCULAR; INTRAVENOUS ONCE
Status: DISCONTINUED | OUTPATIENT
Start: 2018-10-17 | End: 2018-10-17

## 2018-10-17 RX ORDER — OXCARBAZEPINE 150 MG/1
150 TABLET, FILM COATED ORAL 2 TIMES DAILY
COMMUNITY
End: 2018-11-28 | Stop reason: ALTCHOICE

## 2018-10-17 RX ORDER — KETOROLAC TROMETHAMINE 30 MG/ML
30 INJECTION, SOLUTION INTRAMUSCULAR; INTRAVENOUS ONCE
Status: COMPLETED | OUTPATIENT
Start: 2018-10-17 | End: 2018-10-17

## 2018-10-17 RX ADMIN — DIPHENHYDRAMINE HCL 25 MG: 25 TABLET ORAL at 14:16

## 2018-10-17 RX ADMIN — DEXAMETHASONE SODIUM PHOSPHATE 10 MG: 10 INJECTION INTRAMUSCULAR; INTRAVENOUS at 14:17

## 2018-10-17 RX ADMIN — PROMETHAZINE HYDROCHLORIDE 12.5 MG: 25 INJECTION INTRAMUSCULAR; INTRAVENOUS at 14:20

## 2018-10-17 RX ADMIN — KETOROLAC TROMETHAMINE 30 MG: 30 INJECTION, SOLUTION INTRAMUSCULAR at 14:23

## 2018-10-17 ASSESSMENT — PAIN DESCRIPTION - ORIENTATION: ORIENTATION: RIGHT

## 2018-10-17 ASSESSMENT — PAIN DESCRIPTION - PAIN TYPE: TYPE: ACUTE PAIN

## 2018-10-17 ASSESSMENT — PAIN DESCRIPTION - PROGRESSION: CLINICAL_PROGRESSION: GRADUALLY WORSENING

## 2018-10-17 ASSESSMENT — PAIN SCALES - GENERAL
PAINLEVEL_OUTOF10: 9
PAINLEVEL_OUTOF10: 9

## 2018-10-17 ASSESSMENT — PAIN DESCRIPTION - LOCATION: LOCATION: HEAD

## 2018-10-17 ASSESSMENT — PAIN DESCRIPTION - DESCRIPTORS: DESCRIPTORS: THROBBING

## 2018-10-17 NOTE — ED PROVIDER NOTES
[facility] ED  [address] [address]  Phone: [phone]  Fax: [phone]  eMERGENCY dEPARTMENT eNCOUnter      Pt Name: Crispin Felder  MRN: [id number]  [place] 1975  Date of evaluation: 10/17/2018    CHIEF COMPLAINT       Chief Complaint   Patient presents with    Migraine       [address] Krishnan is a 41 y.o. female who presents To the emergency department complaining of a 9/10 right-sided throbbing headache. Patient states that she gets 15 headaches a month. She sees her neurologist last seen last week. She has taken her over-the-counter and prescription medications for her headache. She admits to nausea and vomiting. Typical migraine gradual in onset progressively worsening not the worst headache of her life she gets headaches every 2 days. REVIEW OF SYSTEMS       Constitutional: No fevers or chills   HEENT: No sore throat, rhinorrhea, or earache   Eyes: No blurry vision or double vision no drainage   Cardiovascular: No chest pain or tachycardia   Respiratory: No wheezing or shortness of breath no cough   Gastrointestinal: Positive nausea and vomiting no diarrhea, constipation, or abdominal pain   : No hematuria or dysuria   Musculoskeletal: No swelling or pain   Skin: No rash   Neurological: No focal neurologic complaints, paresthesias, weakness, positive headache    PAST MEDICAL HISTORY    has a past medical history of Anxiety; Bladder disorder; CMT (Charcot-Melissa-Tooth disease); Depression; GERD (gastroesophageal reflux disease); Hypertension; Migraines; Neuropathy; Neuropathy involving both lower extremities; and Psychiatric problem. SURGICAL HISTORY      has a past surgical history that includes Bladder surgery; Neck surgery (08/2012); Hysterectomy; Cholecystectomy; Knee arthroscopy (Bilateral); back surgery; Colonoscopy; Endoscopy, colon, diagnostic; Dilatation, esophagus; and Cervical spine surgery.     CURRENT MEDICATIONS       Previous Medications    CLINDAMYCIN (CLEOCIN) 300 MG CAPSULE    Take 300 mg by mouth 3 times daily    CLONAZEPAM (KLONOPIN) 1 MG TABLET    Take 1 mg by mouth 4 times daily. Katie Rodríguez DIHYDROERGOTAMINE (D.H.E. 45) 1 MG/ML INJECTION    Infuse 1 mg intravenously once    HYDROCODONE-ACETAMINOPHEN (NORCO) 5-325 MG PER TABLET    Take 1 tablet by mouth every 6 hours as needed for Pain. Katie Rodríguez PROMETHAZINE (PHENERGAN) 12.5 MG TABLET    Take 12.5 mg by mouth    RIZATRIPTAN (MAXALT) 10 MG TABLET        TIZANIDINE (ZANAFLEX) 4 MG TABLET    Take 4 mg by mouth every 6 hours as needed       ALLERGIES     is allergic to latex; azithromycin; lamictal [lamotrigine]; pcn [penicillins]; compazine [prochlorperazine maleate]; buspar [buspirone]; and zofran [ondansetron hcl]. FAMILY HISTORY     indicated that her mother is alive. She indicated that her father is . She indicated that her brother is alive. She reported the following about her maternal grandmother: has CMT syndrome. She indicated that the status of her other is unknown.      family history includes Cancer in her father; Coronary Art Dis in her father; Mental Illness in her father; Parkinsonism in an other family member; Stroke in her father. SOCIAL HISTORY      reports that she has never smoked. She has never used smokeless tobacco. She reports that she does not drink alcohol or use drugs.     PHYSICAL EXAM       ED Triage Vitals [10/17/18 1328]   BP Temp Temp Source Pulse Resp SpO2 Height Weight   120/84 97.5 °F (36.4 °C) Oral 86 18 97 % 5' 2\" (1.575 m) 183 lb (83 kg)       Constitutional: Alert, oriented x3, nontoxic, answering questions appropriately, acting properly for age, in no acute distress   HEENT: Extraocular muscles intact, mucus membranes moist, TMs clear bilaterally, no posterior pharyngeal erythema or exudates, Pupils equal, round, reactive to light, no photophobia  Neck: Trachea midline no meningismus  Cardiovascular: Regular rhythm and rate no S3, S4, or murmurs   Respiratory: Clear to auscultation bilaterally no wheezes, rhonchi, rales, no respiratory distress no tachypnea no retractions no hypoxia  Gastrointestinal: Soft, nontender, nondistended, positive bowel sounds. No rebound, rigidity, or guarding. Musculoskeletal: No extremity pain or swelling   Neurologic: Moving all 4 extremities without difficulty there are no gross focal neurologic deficits finger to nose normal  Skin: Warm and dry     Physical Exam  DIFFERENTIAL DIAGNOSIS/ MDM:     Toradol Benadryl Phenergan and Decadron. DIAGNOSTIC RESULTS     EKG: [facility]'s [facility] who either signs or Co-signs this chart in the absence of a cardiologist.        Not indicated unless otherwise documented above    LABS:  No results found for this visit on 10/17/18. Not indicated unless otherwise documented above    RADIOLOGY:   I reviewedthe radiologist interpretations:    No orders to display       Not indicated unless otherwise documented above    EMERGENCY DEPARTMENT COURSE:     The patient was given the following medications:  Orders Placed This Encounter   Medications    ketorolac (TORADOL) injection 30 mg    DISCONTD: HYDROmorphone (DILAUDID) injection 1 mg    DISCONTD: diphenhydrAMINE (BENADRYL) injection 25 mg    promethazine (PHENERGAN) injection 12.5 mg    dexamethasone (DECADRON) injection 10 mg    diphenhydrAMINE (BENADRYL) tablet 25 mg        Vitals:   -------------------------  /84   Pulse 86   Temp 97.5 °F (36.4 °C) (Oral)   Resp 18   Ht 5' 2\" (1.575 m)   Wt 83 kg (183 lb)   SpO2 97%   BMI 33.47 kg/m²     Headache gradual in onset progressively worsening. Meds given. Discharged to follow-up with neurologist.  Low suspicion for significant intracranial pathology. I have reviewedthe disposition diagnosis with the patient and or their family/guardian.  I have answered their questions and given discharge

## 2018-11-28 ENCOUNTER — HOSPITAL ENCOUNTER (EMERGENCY)
Age: 43
Discharge: HOME | End: 2018-11-28
Attending: EMERGENCY MEDICINE
Payer: MEDICARE

## 2018-11-28 VITALS
BODY MASS INDEX: 33.31 KG/M2 | OXYGEN SATURATION: 97 % | DIASTOLIC BLOOD PRESSURE: 89 MMHG | HEART RATE: 84 BPM | SYSTOLIC BLOOD PRESSURE: 139 MMHG | HEIGHT: 62 IN | TEMPERATURE: 98.2 F | RESPIRATION RATE: 18 BRPM | WEIGHT: 181 LBS

## 2018-11-28 DIAGNOSIS — G89.29 CHRONIC NONINTRACTABLE HEADACHE, UNSPECIFIED HEADACHE TYPE: Primary | ICD-10-CM

## 2018-11-28 DIAGNOSIS — R51.9 CHRONIC NONINTRACTABLE HEADACHE, UNSPECIFIED HEADACHE TYPE: Primary | ICD-10-CM

## 2018-11-28 PROCEDURE — 96375 TX/PRO/DX INJ NEW DRUG ADDON: CPT

## 2018-11-28 PROCEDURE — 2580000003 HC RX 258: Performed by: PHYSICIAN ASSISTANT

## 2018-11-28 PROCEDURE — 6370000000 HC RX 637 (ALT 250 FOR IP): Performed by: PHYSICIAN ASSISTANT

## 2018-11-28 PROCEDURE — 96374 THER/PROPH/DIAG INJ IV PUSH: CPT

## 2018-11-28 PROCEDURE — 99283 EMERGENCY DEPT VISIT LOW MDM: CPT

## 2018-11-28 PROCEDURE — 6360000002 HC RX W HCPCS: Performed by: PHYSICIAN ASSISTANT

## 2018-11-28 RX ORDER — 0.9 % SODIUM CHLORIDE 0.9 %
1000 INTRAVENOUS SOLUTION INTRAVENOUS ONCE
Status: COMPLETED | OUTPATIENT
Start: 2018-11-28 | End: 2018-11-28

## 2018-11-28 RX ORDER — PROMETHAZINE HYDROCHLORIDE 25 MG/ML
12.5 INJECTION, SOLUTION INTRAMUSCULAR; INTRAVENOUS ONCE
Status: COMPLETED | OUTPATIENT
Start: 2018-11-28 | End: 2018-11-28

## 2018-11-28 RX ORDER — KETOROLAC TROMETHAMINE 15 MG/ML
15 INJECTION, SOLUTION INTRAMUSCULAR; INTRAVENOUS ONCE
Status: COMPLETED | OUTPATIENT
Start: 2018-11-28 | End: 2018-11-28

## 2018-11-28 RX ORDER — METHYLPREDNISOLONE SODIUM SUCCINATE 125 MG/2ML
125 INJECTION, POWDER, LYOPHILIZED, FOR SOLUTION INTRAMUSCULAR; INTRAVENOUS ONCE
Status: COMPLETED | OUTPATIENT
Start: 2018-11-28 | End: 2018-11-28

## 2018-11-28 RX ORDER — DIPHENHYDRAMINE HCL 25 MG
50 TABLET ORAL ONCE
Status: COMPLETED | OUTPATIENT
Start: 2018-11-28 | End: 2018-11-28

## 2018-11-28 RX ADMIN — DIPHENHYDRAMINE HCL 50 MG: 25 TABLET ORAL at 12:28

## 2018-11-28 RX ADMIN — METHYLPREDNISOLONE SODIUM SUCCINATE 125 MG: 125 INJECTION, POWDER, FOR SOLUTION INTRAMUSCULAR; INTRAVENOUS at 12:21

## 2018-11-28 RX ADMIN — SODIUM CHLORIDE 1000 ML: 9 INJECTION, SOLUTION INTRAVENOUS at 12:20

## 2018-11-28 RX ADMIN — KETOROLAC TROMETHAMINE 15 MG: 15 INJECTION, SOLUTION INTRAMUSCULAR; INTRAVENOUS at 12:23

## 2018-11-28 RX ADMIN — PROMETHAZINE HYDROCHLORIDE 12.5 MG: 25 INJECTION INTRAMUSCULAR; INTRAVENOUS at 12:25

## 2018-11-28 ASSESSMENT — PAIN DESCRIPTION - LOCATION
LOCATION: HEAD
LOCATION: HEAD

## 2018-11-28 ASSESSMENT — PAIN DESCRIPTION - PAIN TYPE
TYPE: ACUTE PAIN
TYPE: ACUTE PAIN

## 2018-11-28 ASSESSMENT — PAIN DESCRIPTION - DESCRIPTORS
DESCRIPTORS: ACHING
DESCRIPTORS: THROBBING;STABBING;SHARP

## 2018-11-28 ASSESSMENT — PAIN SCALES - GENERAL
PAINLEVEL_OUTOF10: 8
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
## 2019-01-19 ENCOUNTER — HOSPITAL ENCOUNTER (EMERGENCY)
Age: 44
Discharge: HOME | End: 2019-01-19
Attending: EMERGENCY MEDICINE
Payer: MEDICARE

## 2019-01-19 VITALS
TEMPERATURE: 97.5 F | HEIGHT: 62 IN | OXYGEN SATURATION: 97 % | WEIGHT: 190 LBS | HEART RATE: 68 BPM | DIASTOLIC BLOOD PRESSURE: 76 MMHG | BODY MASS INDEX: 34.96 KG/M2 | RESPIRATION RATE: 18 BRPM | SYSTOLIC BLOOD PRESSURE: 115 MMHG

## 2019-01-19 DIAGNOSIS — G43.909 MIGRAINE WITHOUT STATUS MIGRAINOSUS, NOT INTRACTABLE, UNSPECIFIED MIGRAINE TYPE: Primary | ICD-10-CM

## 2019-01-19 PROCEDURE — 6360000002 HC RX W HCPCS: Performed by: EMERGENCY MEDICINE

## 2019-01-19 PROCEDURE — 96372 THER/PROPH/DIAG INJ SC/IM: CPT

## 2019-01-19 PROCEDURE — 99283 EMERGENCY DEPT VISIT LOW MDM: CPT

## 2019-01-19 RX ORDER — DEXAMETHASONE 4 MG/1
8 TABLET ORAL ONCE
Status: COMPLETED | OUTPATIENT
Start: 2019-01-19 | End: 2019-01-19

## 2019-01-19 RX ORDER — DIPHENHYDRAMINE HYDROCHLORIDE 50 MG/ML
25 INJECTION INTRAMUSCULAR; INTRAVENOUS ONCE
Status: COMPLETED | OUTPATIENT
Start: 2019-01-19 | End: 2019-01-19

## 2019-01-19 RX ORDER — KETOROLAC TROMETHAMINE 30 MG/ML
30 INJECTION, SOLUTION INTRAMUSCULAR; INTRAVENOUS ONCE
Status: COMPLETED | OUTPATIENT
Start: 2019-01-19 | End: 2019-01-19

## 2019-01-19 RX ORDER — PROMETHAZINE HYDROCHLORIDE 25 MG/ML
25 INJECTION, SOLUTION INTRAMUSCULAR; INTRAVENOUS ONCE
Status: COMPLETED | OUTPATIENT
Start: 2019-01-19 | End: 2019-01-19

## 2019-01-19 RX ADMIN — DEXAMETHASONE 8 MG: 4 TABLET ORAL at 02:03

## 2019-01-19 RX ADMIN — PROMETHAZINE HYDROCHLORIDE 25 MG: 25 INJECTION INTRAMUSCULAR; INTRAVENOUS at 02:06

## 2019-01-19 RX ADMIN — KETOROLAC TROMETHAMINE 30 MG: 30 INJECTION, SOLUTION INTRAMUSCULAR at 02:04

## 2019-01-19 RX ADMIN — DIPHENHYDRAMINE HYDROCHLORIDE 25 MG: 50 INJECTION INTRAMUSCULAR; INTRAVENOUS at 02:08

## 2019-01-19 ASSESSMENT — ENCOUNTER SYMPTOMS
SHORTNESS OF BREATH: 0
RHINORRHEA: 0
SORE THROAT: 0
BACK PAIN: 0
DIARRHEA: 0
VOMITING: 0
COUGH: 0
ABDOMINAL PAIN: 0
NAUSEA: 0
EYE PAIN: 0

## 2019-01-19 ASSESSMENT — PAIN DESCRIPTION - DESCRIPTORS: DESCRIPTORS: PRESSURE;POUNDING

## 2019-01-19 ASSESSMENT — PAIN DESCRIPTION - LOCATION: LOCATION: HEAD

## 2019-01-19 ASSESSMENT — PAIN DESCRIPTION - ORIENTATION: ORIENTATION: RIGHT

## 2019-01-19 ASSESSMENT — PAIN DESCRIPTION - FREQUENCY: FREQUENCY: CONTINUOUS

## 2019-01-19 ASSESSMENT — PAIN SCALES - GENERAL
PAINLEVEL_OUTOF10: 7
PAINLEVEL_OUTOF10: 8

## 2019-01-19 ASSESSMENT — PAIN DESCRIPTION - PAIN TYPE: TYPE: ACUTE PAIN

## 2019-02-06 ENCOUNTER — HOSPITAL ENCOUNTER (EMERGENCY)
Age: 44
Discharge: HOME | End: 2019-02-06
Attending: SPECIALIST
Payer: MEDICARE

## 2019-02-06 VITALS
HEIGHT: 62 IN | TEMPERATURE: 98.1 F | BODY MASS INDEX: 36.44 KG/M2 | OXYGEN SATURATION: 99 % | DIASTOLIC BLOOD PRESSURE: 87 MMHG | SYSTOLIC BLOOD PRESSURE: 126 MMHG | WEIGHT: 198 LBS | HEART RATE: 98 BPM | RESPIRATION RATE: 12 BRPM

## 2019-02-06 DIAGNOSIS — G43.909 MIGRAINE SYNDROME: Primary | ICD-10-CM

## 2019-02-06 PROCEDURE — 99284 EMERGENCY DEPT VISIT MOD MDM: CPT

## 2019-02-06 PROCEDURE — 96372 THER/PROPH/DIAG INJ SC/IM: CPT

## 2019-02-06 PROCEDURE — 96375 TX/PRO/DX INJ NEW DRUG ADDON: CPT

## 2019-02-06 PROCEDURE — 2580000003 HC RX 258: Performed by: SPECIALIST

## 2019-02-06 PROCEDURE — 96374 THER/PROPH/DIAG INJ IV PUSH: CPT

## 2019-02-06 PROCEDURE — 6360000002 HC RX W HCPCS: Performed by: SPECIALIST

## 2019-02-06 RX ORDER — KETOROLAC TROMETHAMINE 30 MG/ML
30 INJECTION, SOLUTION INTRAMUSCULAR; INTRAVENOUS ONCE
Status: COMPLETED | OUTPATIENT
Start: 2019-02-06 | End: 2019-02-06

## 2019-02-06 RX ORDER — PROMETHAZINE HYDROCHLORIDE 25 MG/ML
12.5 INJECTION, SOLUTION INTRAMUSCULAR; INTRAVENOUS ONCE
Status: COMPLETED | OUTPATIENT
Start: 2019-02-06 | End: 2019-02-06

## 2019-02-06 RX ORDER — OXCARBAZEPINE 150 MG/1
150 TABLET, FILM COATED ORAL DAILY
COMMUNITY
End: 2019-06-20

## 2019-02-06 RX ORDER — NALBUPHINE HCL 10 MG/ML
10 AMPUL (ML) INJECTION ONCE
Status: COMPLETED | OUTPATIENT
Start: 2019-02-06 | End: 2019-02-06

## 2019-02-06 RX ORDER — DEXAMETHASONE SODIUM PHOSPHATE 10 MG/ML
10 INJECTION INTRAMUSCULAR; INTRAVENOUS ONCE
Status: COMPLETED | OUTPATIENT
Start: 2019-02-06 | End: 2019-02-06

## 2019-02-06 RX ORDER — DIPHENHYDRAMINE HYDROCHLORIDE 50 MG/ML
25 INJECTION INTRAMUSCULAR; INTRAVENOUS ONCE
Status: COMPLETED | OUTPATIENT
Start: 2019-02-06 | End: 2019-02-06

## 2019-02-06 RX ORDER — DULOXETIN HYDROCHLORIDE 60 MG/1
60 CAPSULE, DELAYED RELEASE ORAL DAILY
COMMUNITY
End: 2019-06-20 | Stop reason: ALTCHOICE

## 2019-02-06 RX ORDER — 0.9 % SODIUM CHLORIDE 0.9 %
1000 INTRAVENOUS SOLUTION INTRAVENOUS ONCE
Status: COMPLETED | OUTPATIENT
Start: 2019-02-06 | End: 2019-02-06

## 2019-02-06 RX ORDER — DIHYDROERGOTAMINE MESYLATE 1 MG/ML
1 INJECTION, SOLUTION INTRAMUSCULAR; INTRAVENOUS; SUBCUTANEOUS 4 TIMES DAILY PRN
COMMUNITY
End: 2019-11-25

## 2019-02-06 RX ADMIN — DEXAMETHASONE SODIUM PHOSPHATE 10 MG: 10 INJECTION INTRAMUSCULAR; INTRAVENOUS at 11:55

## 2019-02-06 RX ADMIN — KETOROLAC TROMETHAMINE 30 MG: 30 INJECTION, SOLUTION INTRAMUSCULAR; INTRAVENOUS at 10:26

## 2019-02-06 RX ADMIN — PROMETHAZINE HYDROCHLORIDE 12.5 MG: 25 INJECTION INTRAMUSCULAR; INTRAVENOUS at 10:20

## 2019-02-06 RX ADMIN — SODIUM CHLORIDE 1000 ML: 9 INJECTION, SOLUTION INTRAVENOUS at 10:26

## 2019-02-06 RX ADMIN — NALBUPHINE HYDROCHLORIDE 10 MG: 10 INJECTION, SOLUTION INTRAMUSCULAR; INTRAVENOUS; SUBCUTANEOUS at 11:55

## 2019-02-06 RX ADMIN — DIPHENHYDRAMINE HYDROCHLORIDE 25 MG: 50 INJECTION, SOLUTION INTRAMUSCULAR; INTRAVENOUS at 10:26

## 2019-02-06 ASSESSMENT — PAIN SCALES - GENERAL
PAINLEVEL_OUTOF10: 9
PAINLEVEL_OUTOF10: 5
PAINLEVEL_OUTOF10: 9

## 2019-02-06 ASSESSMENT — PAIN DESCRIPTION - DESCRIPTORS
DESCRIPTORS: HEADACHE
DESCRIPTORS: HEADACHE

## 2019-02-06 ASSESSMENT — PAIN DESCRIPTION - LOCATION
LOCATION: HEAD

## 2019-02-06 ASSESSMENT — ENCOUNTER SYMPTOMS
SHORTNESS OF BREATH: 0
PHOTOPHOBIA: 1
NAUSEA: 1
VOMITING: 1

## 2019-02-06 ASSESSMENT — PAIN DESCRIPTION - PAIN TYPE
TYPE: ACUTE PAIN
TYPE: ACUTE PAIN

## 2019-02-06 ASSESSMENT — PAIN DESCRIPTION - PROGRESSION
CLINICAL_PROGRESSION: NOT CHANGED
CLINICAL_PROGRESSION: RAPIDLY IMPROVING

## 2019-03-13 ENCOUNTER — APPOINTMENT (OUTPATIENT)
Dept: CT IMAGING | Age: 44
End: 2019-03-13
Payer: MEDICARE

## 2019-03-13 ENCOUNTER — HOSPITAL ENCOUNTER (EMERGENCY)
Age: 44
Discharge: HOME | End: 2019-03-13
Attending: EMERGENCY MEDICINE
Payer: MEDICARE

## 2019-03-13 VITALS
HEART RATE: 90 BPM | SYSTOLIC BLOOD PRESSURE: 166 MMHG | BODY MASS INDEX: 38.64 KG/M2 | TEMPERATURE: 98.2 F | HEIGHT: 62 IN | OXYGEN SATURATION: 95 % | WEIGHT: 210 LBS | RESPIRATION RATE: 14 BRPM | DIASTOLIC BLOOD PRESSURE: 109 MMHG

## 2019-03-13 DIAGNOSIS — R51.9 GENERALIZED HEADACHE: Primary | ICD-10-CM

## 2019-03-13 PROCEDURE — 2580000003 HC RX 258: Performed by: PHYSICIAN ASSISTANT

## 2019-03-13 PROCEDURE — 72125 CT NECK SPINE W/O DYE: CPT

## 2019-03-13 PROCEDURE — 96376 TX/PRO/DX INJ SAME DRUG ADON: CPT

## 2019-03-13 PROCEDURE — 96374 THER/PROPH/DIAG INJ IV PUSH: CPT

## 2019-03-13 PROCEDURE — 70450 CT HEAD/BRAIN W/O DYE: CPT

## 2019-03-13 PROCEDURE — 96375 TX/PRO/DX INJ NEW DRUG ADDON: CPT

## 2019-03-13 PROCEDURE — 6360000002 HC RX W HCPCS: Performed by: PHYSICIAN ASSISTANT

## 2019-03-13 PROCEDURE — 99283 EMERGENCY DEPT VISIT LOW MDM: CPT

## 2019-03-13 RX ORDER — 0.9 % SODIUM CHLORIDE 0.9 %
1000 INTRAVENOUS SOLUTION INTRAVENOUS ONCE
Status: COMPLETED | OUTPATIENT
Start: 2019-03-13 | End: 2019-03-13

## 2019-03-13 RX ORDER — KETOROLAC TROMETHAMINE 30 MG/ML
30 INJECTION, SOLUTION INTRAMUSCULAR; INTRAVENOUS ONCE
Status: COMPLETED | OUTPATIENT
Start: 2019-03-13 | End: 2019-03-13

## 2019-03-13 RX ORDER — TIZANIDINE 4 MG/1
2 TABLET ORAL EVERY 6 HOURS PRN
COMMUNITY

## 2019-03-13 RX ORDER — ZOLPIDEM TARTRATE 5 MG/1
5 TABLET ORAL NIGHTLY PRN
COMMUNITY
End: 2019-06-20

## 2019-03-13 RX ORDER — DEXAMETHASONE SODIUM PHOSPHATE 10 MG/ML
10 INJECTION, SOLUTION INTRAMUSCULAR; INTRAVENOUS ONCE
Status: DISCONTINUED | OUTPATIENT
Start: 2019-03-13 | End: 2019-03-13

## 2019-03-13 RX ORDER — PROMETHAZINE HYDROCHLORIDE 25 MG/ML
12.5 INJECTION, SOLUTION INTRAMUSCULAR; INTRAVENOUS ONCE
Status: COMPLETED | OUTPATIENT
Start: 2019-03-13 | End: 2019-03-13

## 2019-03-13 RX ORDER — DIPHENHYDRAMINE HYDROCHLORIDE 50 MG/ML
50 INJECTION INTRAMUSCULAR; INTRAVENOUS ONCE
Status: COMPLETED | OUTPATIENT
Start: 2019-03-13 | End: 2019-03-13

## 2019-03-13 RX ORDER — NALBUPHINE HCL 10 MG/ML
10 AMPUL (ML) INJECTION ONCE
Status: COMPLETED | OUTPATIENT
Start: 2019-03-13 | End: 2019-03-13

## 2019-03-13 RX ADMIN — DIPHENHYDRAMINE HYDROCHLORIDE 50 MG: 50 INJECTION, SOLUTION INTRAMUSCULAR; INTRAVENOUS at 20:12

## 2019-03-13 RX ADMIN — KETOROLAC TROMETHAMINE 30 MG: 30 INJECTION, SOLUTION INTRAMUSCULAR; INTRAVENOUS at 20:14

## 2019-03-13 RX ADMIN — NALBUPHINE HYDROCHLORIDE 10 MG: 10 INJECTION, SOLUTION INTRAMUSCULAR; INTRAVENOUS; SUBCUTANEOUS at 21:10

## 2019-03-13 RX ADMIN — SODIUM CHLORIDE 1000 ML: 9 INJECTION, SOLUTION INTRAVENOUS at 20:11

## 2019-03-13 RX ADMIN — PROMETHAZINE HYDROCHLORIDE 12.5 MG: 25 INJECTION INTRAMUSCULAR; INTRAVENOUS at 21:10

## 2019-03-13 RX ADMIN — PROMETHAZINE HYDROCHLORIDE 12.5 MG: 25 INJECTION INTRAMUSCULAR; INTRAVENOUS at 20:13

## 2019-03-13 RX ADMIN — SODIUM CHLORIDE 1000 ML: 9 INJECTION, SOLUTION INTRAVENOUS at 21:13

## 2019-03-13 ASSESSMENT — ENCOUNTER SYMPTOMS
SORE THROAT: 0
BACK PAIN: 1
DIARRHEA: 0
EYE DISCHARGE: 0
PHOTOPHOBIA: 1
COUGH: 0
VOMITING: 0
NAUSEA: 1
EYE REDNESS: 0

## 2019-03-13 ASSESSMENT — PAIN SCALES - GENERAL
PAINLEVEL_OUTOF10: 9

## 2019-03-13 ASSESSMENT — PAIN DESCRIPTION - LOCATION: LOCATION: HEAD

## 2019-03-13 ASSESSMENT — PAIN DESCRIPTION - PAIN TYPE: TYPE: ACUTE PAIN

## 2019-03-15 ENCOUNTER — HOSPITAL ENCOUNTER (EMERGENCY)
Age: 44
Discharge: LEFT BEFORE DISCHARGE | End: 2019-03-16
Attending: EMERGENCY MEDICINE | Admitting: PSYCHIATRY & NEUROLOGY
Payer: MEDICARE

## 2019-03-15 VITALS
BODY MASS INDEX: 38.41 KG/M2 | RESPIRATION RATE: 16 BRPM | SYSTOLIC BLOOD PRESSURE: 147 MMHG | WEIGHT: 210 LBS | DIASTOLIC BLOOD PRESSURE: 101 MMHG | TEMPERATURE: 98.2 F | HEART RATE: 96 BPM | OXYGEN SATURATION: 97 %

## 2019-03-15 DIAGNOSIS — G43.809 OTHER MIGRAINE WITHOUT STATUS MIGRAINOSUS, NOT INTRACTABLE: Primary | ICD-10-CM

## 2019-03-15 PROBLEM — G43.711 MIGRAINE, CHRONIC, WITHOUT AURA, INTRACTABLE, WITH STATUS MIGRAINOSUS: Status: ACTIVE | Noted: 2019-03-15

## 2019-03-15 PROCEDURE — 6360000002 HC RX W HCPCS: Performed by: STUDENT IN AN ORGANIZED HEALTH CARE EDUCATION/TRAINING PROGRAM

## 2019-03-15 PROCEDURE — 96372 THER/PROPH/DIAG INJ SC/IM: CPT

## 2019-03-15 PROCEDURE — 96375 TX/PRO/DX INJ NEW DRUG ADDON: CPT

## 2019-03-15 PROCEDURE — 99283 EMERGENCY DEPT VISIT LOW MDM: CPT

## 2019-03-15 PROCEDURE — 2580000003 HC RX 258: Performed by: STUDENT IN AN ORGANIZED HEALTH CARE EDUCATION/TRAINING PROGRAM

## 2019-03-15 PROCEDURE — 6360000002 HC RX W HCPCS

## 2019-03-15 PROCEDURE — 96365 THER/PROPH/DIAG IV INF INIT: CPT

## 2019-03-15 RX ORDER — DULOXETIN HYDROCHLORIDE 30 MG/1
60 CAPSULE, DELAYED RELEASE ORAL DAILY
Status: CANCELLED | OUTPATIENT
Start: 2019-03-16

## 2019-03-15 RX ORDER — MAGNESIUM SULFATE 1 G/100ML
INJECTION INTRAVENOUS
Status: COMPLETED
Start: 2019-03-15 | End: 2019-03-16

## 2019-03-15 RX ORDER — SODIUM CHLORIDE 0.9 % (FLUSH) 0.9 %
10 SYRINGE (ML) INJECTION EVERY 12 HOURS SCHEDULED
Status: CANCELLED | OUTPATIENT
Start: 2019-03-15

## 2019-03-15 RX ORDER — ONDANSETRON 2 MG/ML
4 INJECTION INTRAMUSCULAR; INTRAVENOUS EVERY 6 HOURS PRN
Status: CANCELLED | OUTPATIENT
Start: 2019-03-15

## 2019-03-15 RX ORDER — SODIUM CHLORIDE 0.9 % (FLUSH) 0.9 %
10 SYRINGE (ML) INJECTION PRN
Status: CANCELLED | OUTPATIENT
Start: 2019-03-15

## 2019-03-15 RX ORDER — CLONAZEPAM 1 MG/1
1 TABLET ORAL 3 TIMES DAILY
Status: CANCELLED | OUTPATIENT
Start: 2019-03-15

## 2019-03-15 RX ORDER — DEXAMETHASONE SODIUM PHOSPHATE 10 MG/ML
8 INJECTION INTRAMUSCULAR; INTRAVENOUS ONCE
Status: COMPLETED | OUTPATIENT
Start: 2019-03-15 | End: 2019-03-15

## 2019-03-15 RX ORDER — 0.9 % SODIUM CHLORIDE 0.9 %
1000 INTRAVENOUS SOLUTION INTRAVENOUS ONCE
Status: COMPLETED | OUTPATIENT
Start: 2019-03-15 | End: 2019-03-16

## 2019-03-15 RX ORDER — DEXAMETHASONE SODIUM PHOSPHATE 4 MG/ML
4 INJECTION, SOLUTION INTRA-ARTICULAR; INTRALESIONAL; INTRAMUSCULAR; INTRAVENOUS; SOFT TISSUE ONCE
Status: DISCONTINUED | OUTPATIENT
Start: 2019-03-15 | End: 2019-03-15

## 2019-03-15 RX ORDER — DIPHENHYDRAMINE HYDROCHLORIDE 50 MG/ML
25 INJECTION INTRAMUSCULAR; INTRAVENOUS ONCE
Status: CANCELLED | OUTPATIENT
Start: 2019-03-15

## 2019-03-15 RX ORDER — PROMETHAZINE HYDROCHLORIDE 25 MG/ML
25 INJECTION, SOLUTION INTRAMUSCULAR; INTRAVENOUS ONCE
Status: COMPLETED | OUTPATIENT
Start: 2019-03-15 | End: 2019-03-15

## 2019-03-15 RX ORDER — MAGNESIUM SULFATE 1 G/100ML
2 INJECTION INTRAVENOUS ONCE
Status: COMPLETED | OUTPATIENT
Start: 2019-03-15 | End: 2019-03-16

## 2019-03-15 RX ADMIN — DEXAMETHASONE SODIUM PHOSPHATE 8 MG: 10 INJECTION INTRAMUSCULAR; INTRAVENOUS at 19:58

## 2019-03-15 RX ADMIN — MAGNESIUM SULFATE HEPTAHYDRATE 2 G: 1 INJECTION, SOLUTION INTRAVENOUS at 23:15

## 2019-03-15 RX ADMIN — SODIUM CHLORIDE 1000 ML: 9 INJECTION, SOLUTION INTRAVENOUS at 19:55

## 2019-03-15 RX ADMIN — PROMETHAZINE HYDROCHLORIDE 25 MG: 25 INJECTION INTRAMUSCULAR; INTRAVENOUS at 19:55

## 2019-03-15 RX ADMIN — MAGNESIUM SULFATE 2 G: 1 INJECTION INTRAVENOUS at 23:15

## 2019-03-15 ASSESSMENT — PAIN DESCRIPTION - FREQUENCY: FREQUENCY: CONTINUOUS

## 2019-03-15 ASSESSMENT — PAIN SCALES - GENERAL: PAINLEVEL_OUTOF10: 9

## 2019-03-15 ASSESSMENT — PAIN DESCRIPTION - PAIN TYPE: TYPE: CHRONIC PAIN

## 2019-03-15 ASSESSMENT — PAIN DESCRIPTION - DESCRIPTORS: DESCRIPTORS: ACHING

## 2019-03-15 ASSESSMENT — PAIN DESCRIPTION - LOCATION: LOCATION: HEAD

## 2019-03-16 PROCEDURE — 6370000000 HC RX 637 (ALT 250 FOR IP): Performed by: EMERGENCY MEDICINE

## 2019-03-16 PROCEDURE — 96366 THER/PROPH/DIAG IV INF ADDON: CPT

## 2019-03-16 PROCEDURE — 6370000000 HC RX 637 (ALT 250 FOR IP): Performed by: STUDENT IN AN ORGANIZED HEALTH CARE EDUCATION/TRAINING PROGRAM

## 2019-03-16 RX ORDER — DIPHENHYDRAMINE HCL 25 MG
25 TABLET ORAL ONCE
Status: COMPLETED | OUTPATIENT
Start: 2019-03-16 | End: 2019-03-16

## 2019-03-16 RX ORDER — ZOLPIDEM TARTRATE 5 MG/1
5 TABLET ORAL NIGHTLY PRN
Status: DISCONTINUED | OUTPATIENT
Start: 2019-03-16 | End: 2019-03-16 | Stop reason: HOSPADM

## 2019-03-16 RX ADMIN — DIPHENHYDRAMINE HCL 25 MG: 25 TABLET ORAL at 01:44

## 2019-03-16 RX ADMIN — ZOLPIDEM TARTRATE 5 MG: 5 TABLET ORAL at 01:44

## 2019-03-17 ASSESSMENT — ENCOUNTER SYMPTOMS
SHORTNESS OF BREATH: 0
NAUSEA: 0
WHEEZING: 0
COUGH: 0
ABDOMINAL PAIN: 0
CONSTIPATION: 0
VOMITING: 0
PHOTOPHOBIA: 1
DIARRHEA: 0

## 2019-04-06 ENCOUNTER — APPOINTMENT (OUTPATIENT)
Dept: CT IMAGING | Age: 44
End: 2019-04-06
Payer: MEDICARE

## 2019-04-06 ENCOUNTER — HOSPITAL ENCOUNTER (EMERGENCY)
Age: 44
Discharge: HOME | End: 2019-04-06
Attending: EMERGENCY MEDICINE
Payer: MEDICARE

## 2019-04-06 VITALS
RESPIRATION RATE: 18 BRPM | HEART RATE: 91 BPM | HEIGHT: 62 IN | BODY MASS INDEX: 38.64 KG/M2 | SYSTOLIC BLOOD PRESSURE: 157 MMHG | TEMPERATURE: 97.7 F | OXYGEN SATURATION: 94 % | DIASTOLIC BLOOD PRESSURE: 106 MMHG | WEIGHT: 210 LBS

## 2019-04-06 DIAGNOSIS — G43.909 MIGRAINE WITHOUT STATUS MIGRAINOSUS, NOT INTRACTABLE, UNSPECIFIED MIGRAINE TYPE: ICD-10-CM

## 2019-04-06 DIAGNOSIS — J02.9 ACUTE PHARYNGITIS, UNSPECIFIED ETIOLOGY: Primary | ICD-10-CM

## 2019-04-06 LAB
ABSOLUTE EOS #: 0.2 K/UL (ref 0–0.4)
ABSOLUTE IMMATURE GRANULOCYTE: NORMAL K/UL (ref 0–0.3)
ABSOLUTE LYMPH #: 2.1 K/UL (ref 1–4.8)
ABSOLUTE MONO #: 0.5 K/UL (ref 0.1–1.2)
ALBUMIN SERPL-MCNC: 4.4 G/DL (ref 3.5–5.2)
ALBUMIN/GLOBULIN RATIO: 1.2 (ref 1–2.5)
ALP BLD-CCNC: 109 U/L (ref 35–104)
ALT SERPL-CCNC: 17 U/L (ref 5–33)
ANION GAP SERPL CALCULATED.3IONS-SCNC: 14 MMOL/L (ref 9–17)
AST SERPL-CCNC: 18 U/L
BASOPHILS # BLD: 1 % (ref 0–2)
BASOPHILS ABSOLUTE: 0 K/UL (ref 0–0.2)
BILIRUB SERPL-MCNC: 0.34 MG/DL (ref 0.3–1.2)
BUN BLDV-MCNC: 9 MG/DL (ref 6–20)
BUN/CREAT BLD: ABNORMAL (ref 9–20)
CALCIUM SERPL-MCNC: 9.9 MG/DL (ref 8.6–10.4)
CHLORIDE BLD-SCNC: 103 MMOL/L (ref 98–107)
CO2: 23 MMOL/L (ref 20–31)
CREAT SERPL-MCNC: 0.64 MG/DL (ref 0.5–0.9)
DIFFERENTIAL TYPE: NORMAL
DIRECT EXAM: NORMAL
EOSINOPHILS RELATIVE PERCENT: 3 % (ref 1–4)
GFR AFRICAN AMERICAN: >60 ML/MIN
GFR NON-AFRICAN AMERICAN: >60 ML/MIN
GFR SERPL CREATININE-BSD FRML MDRD: ABNORMAL ML/MIN/{1.73_M2}
GFR SERPL CREATININE-BSD FRML MDRD: ABNORMAL ML/MIN/{1.73_M2}
GLUCOSE BLD-MCNC: 87 MG/DL (ref 70–99)
HCT VFR BLD CALC: 41.9 % (ref 36–46)
HEMOGLOBIN: 14.2 G/DL (ref 12–16)
IMMATURE GRANULOCYTES: NORMAL %
LIPASE: 31 U/L (ref 13–60)
LYMPHOCYTES # BLD: 27 % (ref 24–44)
Lab: NORMAL
MAGNESIUM: 1.8 MG/DL (ref 1.6–2.6)
MCH RBC QN AUTO: 29.7 PG (ref 26–34)
MCHC RBC AUTO-ENTMCNC: 33.8 G/DL (ref 31–37)
MCV RBC AUTO: 87.9 FL (ref 80–100)
MONOCYTES # BLD: 7 % (ref 2–11)
NRBC AUTOMATED: NORMAL PER 100 WBC
PDW BLD-RTO: 14.1 % (ref 12.5–15.4)
PLATELET # BLD: 336 K/UL (ref 140–450)
PLATELET ESTIMATE: NORMAL
PMV BLD AUTO: 7.4 FL (ref 6–12)
POTASSIUM SERPL-SCNC: 3.7 MMOL/L (ref 3.7–5.3)
RBC # BLD: 4.77 M/UL (ref 4–5.2)
RBC # BLD: NORMAL 10*6/UL
SEG NEUTROPHILS: 62 % (ref 36–66)
SEGMENTED NEUTROPHILS ABSOLUTE COUNT: 4.9 K/UL (ref 1.8–7.7)
SODIUM BLD-SCNC: 140 MMOL/L (ref 135–144)
SPECIMEN DESCRIPTION: NORMAL
TOTAL PROTEIN: 8 G/DL (ref 6.4–8.3)
WBC # BLD: 7.8 K/UL (ref 3.5–11)
WBC # BLD: NORMAL 10*3/UL

## 2019-04-06 PROCEDURE — 36415 COLL VENOUS BLD VENIPUNCTURE: CPT

## 2019-04-06 PROCEDURE — 6360000002 HC RX W HCPCS: Performed by: EMERGENCY MEDICINE

## 2019-04-06 PROCEDURE — 96374 THER/PROPH/DIAG INJ IV PUSH: CPT

## 2019-04-06 PROCEDURE — 6370000000 HC RX 637 (ALT 250 FOR IP): Performed by: EMERGENCY MEDICINE

## 2019-04-06 PROCEDURE — 80053 COMPREHEN METABOLIC PANEL: CPT

## 2019-04-06 PROCEDURE — 85025 COMPLETE CBC W/AUTO DIFF WBC: CPT

## 2019-04-06 PROCEDURE — 83735 ASSAY OF MAGNESIUM: CPT

## 2019-04-06 PROCEDURE — 96375 TX/PRO/DX INJ NEW DRUG ADDON: CPT

## 2019-04-06 PROCEDURE — 99284 EMERGENCY DEPT VISIT MOD MDM: CPT

## 2019-04-06 PROCEDURE — 70490 CT SOFT TISSUE NECK W/O DYE: CPT

## 2019-04-06 PROCEDURE — 83690 ASSAY OF LIPASE: CPT

## 2019-04-06 PROCEDURE — 87880 STREP A ASSAY W/OPTIC: CPT

## 2019-04-06 RX ORDER — CLINDAMYCIN HYDROCHLORIDE 300 MG/1
300 CAPSULE ORAL 4 TIMES DAILY
Qty: 28 CAPSULE | Refills: 0 | Status: SHIPPED | OUTPATIENT
Start: 2019-04-06 | End: 2019-04-13

## 2019-04-06 RX ORDER — KETOROLAC TROMETHAMINE 30 MG/ML
30 INJECTION, SOLUTION INTRAMUSCULAR; INTRAVENOUS ONCE
Status: COMPLETED | OUTPATIENT
Start: 2019-04-06 | End: 2019-04-06

## 2019-04-06 RX ORDER — CLINDAMYCIN PHOSPHATE 900 MG/50ML
900 INJECTION INTRAVENOUS ONCE
Status: DISCONTINUED | OUTPATIENT
Start: 2019-04-06 | End: 2019-04-06

## 2019-04-06 RX ORDER — PROMETHAZINE HYDROCHLORIDE 25 MG/ML
12.5 INJECTION, SOLUTION INTRAMUSCULAR; INTRAVENOUS ONCE
Status: COMPLETED | OUTPATIENT
Start: 2019-04-06 | End: 2019-04-06

## 2019-04-06 RX ORDER — SODIUM CHLORIDE 0.9 % (FLUSH) 0.9 %
10 SYRINGE (ML) INJECTION PRN
Status: DISCONTINUED | OUTPATIENT
Start: 2019-04-06 | End: 2019-04-07 | Stop reason: HOSPADM

## 2019-04-06 RX ORDER — DEXAMETHASONE 2 MG/1
6 TABLET ORAL 2 TIMES DAILY WITH MEALS
Qty: 24 TABLET | Refills: 0 | Status: SHIPPED | OUTPATIENT
Start: 2019-04-06 | End: 2019-04-10

## 2019-04-06 RX ORDER — DIPHENHYDRAMINE HYDROCHLORIDE 50 MG/ML
25 INJECTION INTRAMUSCULAR; INTRAVENOUS ONCE
Status: COMPLETED | OUTPATIENT
Start: 2019-04-06 | End: 2019-04-06

## 2019-04-06 RX ORDER — DEXAMETHASONE SODIUM PHOSPHATE 10 MG/ML
10 INJECTION INTRAMUSCULAR; INTRAVENOUS ONCE
Status: COMPLETED | OUTPATIENT
Start: 2019-04-06 | End: 2019-04-06

## 2019-04-06 RX ORDER — OXCARBAZEPINE 300 MG/1
300 TABLET, FILM COATED ORAL 2 TIMES DAILY
COMMUNITY
End: 2019-11-25

## 2019-04-06 RX ORDER — CLINDAMYCIN HYDROCHLORIDE 150 MG/1
300 CAPSULE ORAL ONCE
Status: COMPLETED | OUTPATIENT
Start: 2019-04-06 | End: 2019-04-06

## 2019-04-06 RX ORDER — 0.9 % SODIUM CHLORIDE 0.9 %
60 INTRAVENOUS SOLUTION INTRAVENOUS ONCE
Status: DISCONTINUED | OUTPATIENT
Start: 2019-04-06 | End: 2019-04-07 | Stop reason: HOSPADM

## 2019-04-06 RX ADMIN — PROMETHAZINE HYDROCHLORIDE 12.5 MG: 25 INJECTION INTRAMUSCULAR; INTRAVENOUS at 21:53

## 2019-04-06 RX ADMIN — DEXAMETHASONE SODIUM PHOSPHATE 10 MG: 10 INJECTION INTRAMUSCULAR; INTRAVENOUS at 21:53

## 2019-04-06 RX ADMIN — KETOROLAC TROMETHAMINE 30 MG: 30 INJECTION, SOLUTION INTRAMUSCULAR at 21:53

## 2019-04-06 RX ADMIN — CLINDAMYCIN HYDROCHLORIDE 300 MG: 150 CAPSULE ORAL at 23:14

## 2019-04-06 RX ADMIN — DIPHENHYDRAMINE HYDROCHLORIDE 25 MG: 50 INJECTION INTRAMUSCULAR; INTRAVENOUS at 21:53

## 2019-04-06 ASSESSMENT — PAIN SCALES - GENERAL
PAINLEVEL_OUTOF10: 7
PAINLEVEL_OUTOF10: 7
PAINLEVEL_OUTOF10: 9
PAINLEVEL_OUTOF10: 9

## 2019-04-06 ASSESSMENT — ENCOUNTER SYMPTOMS
SHORTNESS OF BREATH: 0
DIARRHEA: 0
RHINORRHEA: 0
BACK PAIN: 0
EYE PAIN: 0
SORE THROAT: 1
NAUSEA: 0
COUGH: 0
ABDOMINAL PAIN: 0
VOMITING: 0

## 2019-04-06 ASSESSMENT — PAIN DESCRIPTION - LOCATION
LOCATION: HEAD
LOCATION: HEAD

## 2019-04-07 NOTE — ED PROVIDER NOTES
[facility] ED  [address] [facility] [address]  Phone: [phone]  Fax: [phone]    eMERGENCY dEPARTMENT eNCOUnter          Pt Name: Sandy Gutierrez  MRN: [id number]  [place] 1975  Date of evaluation: 4/6/2019      CHIEF COMPLAINT       Chief Complaint   Patient presents with    Headache     for 3 days    Nausea    Pharyngitis     for 2 days       HISTORY OF PRESENT ILLNESS      HPI      Sandy Gutierrez is a 41 y.o. female who presents with 2 complaints. First is a migraine that began gradually 3 days ago. Took her typical migraine meds that helped but reports the migraine came back when the medications wore off and she is not able to relieve the discomfort. Does have a history of migraines that feel similar nature. Denies any neurologic symptoms such as numbness/weakness/paresthesias. No bowel or bladder retention/incontinence. Nothing otherwise makes her symptoms better or worse. Patient's second complaint is pharyngitis that began yesterday. States she had upper respiratory infection symptoms a week ago that seemed to have improved in the pharyngitis began yesterday. Has been able swallow. No hoarseness. Does report some vomiting from the headache but denies any diarrhea or abdominal pain. Denies any fevers. Nothing otherwise makes him better or worse. Denies other concerns this evening. REVIEW OF SYSTEMS         Review of Systems   Constitutional: Negative for chills, fatigue and fever. HENT: Positive for congestion and sore throat. Negative for rhinorrhea. Eyes: Negative for pain. Respiratory: Negative for cough and shortness of breath. Cardiovascular: Negative for chest pain. Gastrointestinal: Negative for abdominal pain, diarrhea, nausea and vomiting. Genitourinary: Negative for difficulty urinating. Musculoskeletal: Negative for back pain and neck pain. Skin: Negative for rash. Neurological: Positive for headaches. Coronary Art Dis in her father; Mental Illness in her father; Parkinsonism in an other family member; Stroke in her father. SOCIAL HISTORY      reports that she has never smoked. She has never used smokeless tobacco. She reports that she does not drink alcohol or use drugs. PHYSICAL EXAM     INITIAL VITALS:  height is 5' 2\" (1.575 m) and weight is 95.3 kg (210 lb). Her oral temperature is 97.7 °F (36.5 °C). Her blood pressure is 157/106 (abnormal) and her pulse is 91. Her respiration is 18 and oxygen saturation is 94%. Physical Exam   Constitutional: She is oriented to person, place, and time. She appears well-developed and well-nourished. Non-toxic appearance. She does not appear ill. No distress. HENT:   Head: Normocephalic and atraumatic. Right Ear: Tympanic membrane, external ear and ear canal normal. Tympanic membrane is not erythematous. Left Ear: Tympanic membrane, external ear and ear canal normal. Tympanic membrane is not erythematous. Nose: Nose normal.   Mouth/Throat: Uvula is midline and mucous membranes are normal. No uvula swelling. Posterior oropharyngeal erythema present. No oropharyngeal exudate or tonsillar abscesses. Posterior pharynx shows no asymmetry however does have some tonsillar hypertrophy. No obvious exudates. Erythema noted. No facial edema or erythema. Phonating normally. Otherwise unremarkable exam.       Eyes: Pupils are equal, round, and reactive to light. Conjunctivae, EOM and lids are normal.   Neck: Normal range of motion and full passive range of motion without pain. Neck supple. No spinous process tenderness and no muscular tenderness present. No tracheal deviation present. Cardiovascular: Normal rate and regular rhythm. Pulmonary/Chest: Effort normal and breath sounds normal. No accessory muscle usage. No respiratory distress. She has no decreased breath sounds. She has no wheezes. She has no rhonchi. Abdominal: Soft. There is no tenderness. There is no rigidity and no guarding. Neurological: She is alert and oriented to person, place, and time. She has normal strength and normal reflexes. No cranial nerve deficit or sensory deficit. Coordination normal. GCS eye subscore is 4. GCS verbal subscore is 5. GCS motor subscore is 6. Normal neurologic exam.  No focal deficits. Skin: Skin is warm and dry. Psychiatric: She has a normal mood and affect. Her speech is normal.   Vitals reviewed. DIFFERENTIAL DIAGNOSIS/ MDM:     Plan will be to check CT scan of the soft tissue neck based on how the patient describes her symptoms as somewhat of a choking/tight sensation. We'll also treat her migraine. Clinically she appears well and is otherwise nontoxic. DIAGNOSTIC RESULTS     EKG: All EKG's are interpreted by the Emergency Department Physician who either signs or Co-signs this chart in the absence of a cardiologist.        RADIOLOGY:   I directly visualized the following  images and reviewed the radiologist interpretations:  CT SOFT TISSUE NECK WO CONTRAST   Final Result   Limited noncontrast study. No acute abnormality identified. Ct Head Wo Contrast    Result Date: 3/13/2019  EXAMINATION: CT OF THE HEAD WITHOUT CONTRAST  3/13/2019 7:42 pm TECHNIQUE: CT of the head was performed without the administration of intravenous contrast. Dose modulation, iterative reconstruction, and/or weight based adjustment of the mA/kV was utilized to reduce the radiation dose to as low as reasonably achievable. COMPARISON: CT brain performed 04/27/2018. HISTORY: ORDERING SYSTEM PROVIDED HISTORY: fall; hit head; HA TECHNOLOGIST PROVIDED HISTORY: FINDINGS: BRAIN/VENTRICLES: There is no acute intracranial hemorrhage, mass effect, or midline shift. There is satisfactory overall gray-white matter differentiation. The ventricular structures are symmetric and unremarkable. The infratentorial structures are unremarkable.  ORBITS: The visualized portion of the orbits demonstrate no acute abnormality. SINUSES: The visualized paranasal sinuses and mastoid air cells demonstrate no acute abnormality. SOFT TISSUES/SKULL:  No acute abnormality of the visualized skull or soft tissues. No acute intracranial abnormality. Ct Soft Tissue Neck Wo Contrast    Result Date: 4/6/2019  EXAMINATION: CT OF THE NECK WITHOUT CONTRAST  4/6/2019 TECHNIQUE: CT of the neck was performed without the administration of intravenous contrast. Multiplanar reformatted images are provided for review. Dose modulation, iterative reconstruction, and/or weight based adjustment of the mA/kV was utilized to reduce the radiation dose to as low as reasonably achievable. COMPARISON: None. HISTORY: ORDERING SYSTEM PROVIDED HISTORY: R/O peritonsilar abscess TECHNOLOGIST PROVIDED HISTORY: Ordering Physician Provided Reason for Exam: difficulty swallowing Acuity: Acute Type of Exam: Initial FINDINGS: The lung apices are clear. The thyroid gland is homogeneous in density. No retropharyngeal free fluid is identified. The major salivary glands are symmetric in size and density and visualized portions of the tongue are within normal limits. The nasopharynx, oropharynx, hypopharynx, larynx, and upper esophagus are grossly within normal limits for a noncontrast study. The patient is status post anterior cervical discectomy and fusion from C4-C5. There has also been lateral mass screw with connecting babita fixation on the right-hand side. Electrodes are seen within the posterior aspect of the neck on the right-hand side. Limited noncontrast study. No acute abnormality identified. LABS:  Results for orders placed or performed during the hospital encounter of 04/06/19   Strep Screen Group A Throat   Result Value Ref Range    Specimen Description . THROAT SWAB     Special Requests NOT REPORTED     Direct Exam       Rapid Strep A negative.  A negative Rapid Group A Strep Screen result does not rule out the possibility of Group A Streptococci in the specimen.  A Group A Strep DNA test is available upon request.   CBC Auto Differential   Result Value Ref Range    WBC 7.8 3.5 - 11.0 k/uL    RBC 4.77 4.0 - 5.2 m/uL    Hemoglobin 14.2 12.0 - 16.0 g/dL    Hematocrit 41.9 36 - 46 %    MCV 87.9 80 - 100 fL    MCH 29.7 26 - 34 pg    MCHC 33.8 31 - 37 g/dL    RDW 14.1 12.5 - 15.4 %    Platelets 667 123 - 615 k/uL    MPV 7.4 6.0 - 12.0 fL    NRBC Automated NOT REPORTED per 100 WBC    Differential Type NOT REPORTED     Seg Neutrophils 62 36 - 66 %    Lymphocytes 27 24 - 44 %    Monocytes 7 2 - 11 %    Eosinophils % 3 1 - 4 %    Basophils 1 0 - 2 %    Immature Granulocytes NOT REPORTED 0 %    Segs Absolute 4.90 1.8 - 7.7 k/uL    Absolute Lymph # 2.10 1.0 - 4.8 k/uL    Absolute Mono # 0.50 0.1 - 1.2 k/uL    Absolute Eos # 0.20 0.0 - 0.4 k/uL    Basophils # 0.00 0.0 - 0.2 k/uL    Absolute Immature Granulocyte NOT REPORTED 0.00 - 0.30 k/uL    WBC Morphology NOT REPORTED     RBC Morphology NOT REPORTED     Platelet Estimate NOT REPORTED    Comprehensive Metabolic Panel   Result Value Ref Range    Glucose 87 70 - 99 mg/dL    BUN 9 6 - 20 mg/dL    CREATININE 0.64 0.50 - 0.90 mg/dL    Bun/Cre Ratio NOT REPORTED 9 - 20    Calcium 9.9 8.6 - 10.4 mg/dL    Sodium 140 135 - 144 mmol/L    Potassium 3.7 3.7 - 5.3 mmol/L    Chloride 103 98 - 107 mmol/L    CO2 23 20 - 31 mmol/L    Anion Gap 14 9 - 17 mmol/L    Alkaline Phosphatase 109 (H) 35 - 104 U/L    ALT 17 5 - 33 U/L    AST 18 <32 U/L    Total Bilirubin 0.34 0.3 - 1.2 mg/dL    Total Protein 8.0 6.4 - 8.3 g/dL    Alb 4.4 3.5 - 5.2 g/dL    Albumin/Globulin Ratio 1.2 1.0 - 2.5    GFR Non-African American >60 >60 mL/min    GFR African American >60 >60 mL/min    GFR Comment          GFR Staging NOT REPORTED    Magnesium   Result Value Ref Range    Magnesium 1.8 1.6 - 2.6 mg/dL   Lipase   Result Value Ref Range    Lipase 31 13 - 60 U/L       EMERGENCY DEPARTMENT COURSE:     The patient was given the following medications:  Orders Placed This Encounter   Medications    diphenhydrAMINE (BENADRYL) injection 25 mg    dexamethasone (DECADRON) injection 10 mg    ketorolac (TORADOL) injection 30 mg    promethazine (PHENERGAN) injection 12.5 mg    DISCONTD: clindamycin (CLEOCIN) 900 mg in dextrose 5 % 50 mL IVPB    ioversol (OPTIRAY) 74 % injection 75 mL    sodium chloride flush 0.9 % injection 10 mL    0.9 % sodium chloride bolus    clindamycin (CLEOCIN) 300 MG capsule     Sig: Take 1 capsule by mouth 4 times daily for 7 days     Dispense:  28 capsule     Refill:  0    clindamycin (CLEOCIN) capsule 300 mg    dexamethasone (DECADRON) 2 MG tablet     Sig: Take 3 tablets by mouth 2 times daily (with meals) for 4 days     Dispense:  24 tablet     Refill:  0        Vitals:    Vitals:    04/06/19 2003   BP: (!) 157/106   Pulse: 91   Resp: 18   Temp: 97.7 °F (36.5 °C)   TempSrc: Oral   SpO2: 94%   Weight: 95.3 kg (210 lb)   Height: 5' 2\" (1.575 m)     -------------------------  BP: (!) 157/106, Temp: 97.7 °F (36.5 °C), Pulse: 91, Resp: 18      Re-evaluation Notes    11:15 PM:   Patient feeling better on reevaluation. No acute changes. Imaging shows no acute significant findings. Noncontrast study had to be done due to very poor peripheral IV access. Patient states she is known to have poor peripheral access. He did recommend talking to her doctor about having a port placed especially since she has to come to the emergency department very frequently for her complaints. I suspicion is very low for peritonsillar abscess or cellulitis. I will put the patient on clindamycin and steroids to help with her pharyngeal symptoms. I did recommend that she follow up with her PCP on Monday or return right away for significant or for new or concerning symptoms. She is comfortable with this plan. I have reviewed the disposition diagnosis with the patient and or their family/guardian.   I have answered their questions and given discharge instructions. They voiced understanding of these instructions and did not have any further questions or complaints. CONSULTS:    None    CRITICAL CARE:     None    PROCEDURES:    None    FINAL IMPRESSION      1. Acute pharyngitis, unspecified etiology    2.  Migraine without status migrainosus, not intractable, unspecified migraine type          DISPOSITION/PLAN   DISPOSITION Discharge - Pending Orders Complete 04/06/2019 11:01:06 PM      Condition on Disposition    Improved    PATIENT REFERRED TO:  DO Donna Humphreys  [address] [facility]  [phone]    Schedule an appointment as soon as possible for a visit in 2 days        DISCHARGE MEDICATIONS:  New Prescriptions    CLINDAMYCIN (CLEOCIN) 300 MG CAPSULE    Take 1 capsule by mouth 4 times daily for 7 days    DEXAMETHASONE (DECADRON) 2 MG TABLET    Take 3 tablets by mouth 2 times daily (with meals) for 4 days       (Please note that portions of this note were completed with a voice recognition program.  Efforts were made to edit the dictations but occasionally words are mis-transcribed.)    Gerardo Watters DO  Attending Emergency Physician       Gerardo Watters DO  04/06/19 6235

## 2019-06-20 ENCOUNTER — HOSPITAL ENCOUNTER (EMERGENCY)
Age: 44
Discharge: HOME | End: 2019-06-20
Attending: SPECIALIST
Payer: MEDICARE

## 2019-06-20 VITALS
SYSTOLIC BLOOD PRESSURE: 134 MMHG | WEIGHT: 211 LBS | HEIGHT: 62 IN | TEMPERATURE: 98.4 F | OXYGEN SATURATION: 96 % | HEART RATE: 90 BPM | DIASTOLIC BLOOD PRESSURE: 79 MMHG | RESPIRATION RATE: 16 BRPM | BODY MASS INDEX: 38.83 KG/M2

## 2019-06-20 DIAGNOSIS — G43.909 MIGRAINE SYNDROME: Primary | ICD-10-CM

## 2019-06-20 DIAGNOSIS — M79.605 LEG PAIN, BILATERAL: ICD-10-CM

## 2019-06-20 DIAGNOSIS — M79.604 LEG PAIN, BILATERAL: ICD-10-CM

## 2019-06-20 PROCEDURE — 99283 EMERGENCY DEPT VISIT LOW MDM: CPT

## 2019-06-20 PROCEDURE — 96374 THER/PROPH/DIAG INJ IV PUSH: CPT

## 2019-06-20 PROCEDURE — 6360000002 HC RX W HCPCS: Performed by: PHYSICIAN ASSISTANT

## 2019-06-20 PROCEDURE — 96375 TX/PRO/DX INJ NEW DRUG ADDON: CPT

## 2019-06-20 PROCEDURE — 2580000003 HC RX 258: Performed by: PHYSICIAN ASSISTANT

## 2019-06-20 RX ORDER — DEXAMETHASONE SODIUM PHOSPHATE 4 MG/ML
4 INJECTION, SOLUTION INTRA-ARTICULAR; INTRALESIONAL; INTRAMUSCULAR; INTRAVENOUS; SOFT TISSUE ONCE
Status: COMPLETED | OUTPATIENT
Start: 2019-06-20 | End: 2019-06-20

## 2019-06-20 RX ORDER — 0.9 % SODIUM CHLORIDE 0.9 %
1000 INTRAVENOUS SOLUTION INTRAVENOUS ONCE
Status: COMPLETED | OUTPATIENT
Start: 2019-06-20 | End: 2019-06-20

## 2019-06-20 RX ORDER — NALBUPHINE HCL 10 MG/ML
10 AMPUL (ML) INJECTION ONCE
Status: COMPLETED | OUTPATIENT
Start: 2019-06-20 | End: 2019-06-20

## 2019-06-20 RX ORDER — ESZOPICLONE 3 MG/1
3 TABLET, FILM COATED ORAL NIGHTLY
COMMUNITY
End: 2019-11-25

## 2019-06-20 RX ORDER — KETOROLAC TROMETHAMINE 15 MG/ML
15 INJECTION, SOLUTION INTRAMUSCULAR; INTRAVENOUS ONCE
Status: COMPLETED | OUTPATIENT
Start: 2019-06-20 | End: 2019-06-20

## 2019-06-20 RX ORDER — METOPROLOL SUCCINATE 50 MG/1
50 TABLET, EXTENDED RELEASE ORAL DAILY
COMMUNITY
End: 2019-11-25

## 2019-06-20 RX ORDER — DIPHENHYDRAMINE HYDROCHLORIDE 50 MG/ML
50 INJECTION INTRAMUSCULAR; INTRAVENOUS ONCE
Status: COMPLETED | OUTPATIENT
Start: 2019-06-20 | End: 2019-06-20

## 2019-06-20 RX ORDER — CLONAZEPAM 1 MG/1
1 TABLET ORAL 3 TIMES DAILY PRN
COMMUNITY
End: 2019-11-25

## 2019-06-20 RX ORDER — PROMETHAZINE HYDROCHLORIDE 25 MG/ML
12.5 INJECTION, SOLUTION INTRAMUSCULAR; INTRAVENOUS ONCE
Status: COMPLETED | OUTPATIENT
Start: 2019-06-20 | End: 2019-06-20

## 2019-06-20 RX ADMIN — DEXAMETHASONE SODIUM PHOSPHATE 4 MG: 4 INJECTION, SOLUTION INTRAMUSCULAR; INTRAVENOUS at 16:11

## 2019-06-20 RX ADMIN — KETOROLAC TROMETHAMINE 15 MG: 15 INJECTION, SOLUTION INTRAMUSCULAR; INTRAVENOUS at 14:54

## 2019-06-20 RX ADMIN — SODIUM CHLORIDE 1000 ML: 9 INJECTION, SOLUTION INTRAVENOUS at 14:54

## 2019-06-20 RX ADMIN — NALBUPHINE HYDROCHLORIDE 10 MG: 10 INJECTION, SOLUTION INTRAMUSCULAR; INTRAVENOUS; SUBCUTANEOUS at 16:11

## 2019-06-20 RX ADMIN — DIPHENHYDRAMINE HYDROCHLORIDE 50 MG: 50 INJECTION, SOLUTION INTRAMUSCULAR; INTRAVENOUS at 14:54

## 2019-06-20 RX ADMIN — PROMETHAZINE HYDROCHLORIDE 12.5 MG: 25 INJECTION INTRAMUSCULAR; INTRAVENOUS at 14:54

## 2019-06-20 ASSESSMENT — PAIN DESCRIPTION - PROGRESSION
CLINICAL_PROGRESSION: NOT CHANGED
CLINICAL_PROGRESSION: GRADUALLY IMPROVING

## 2019-06-20 ASSESSMENT — PAIN SCALES - GENERAL
PAINLEVEL_OUTOF10: 8
PAINLEVEL_OUTOF10: 7
PAINLEVEL_OUTOF10: 8
PAINLEVEL_OUTOF10: 8

## 2019-06-20 ASSESSMENT — PAIN DESCRIPTION - LOCATION: LOCATION: HEAD

## 2019-06-20 ASSESSMENT — PAIN DESCRIPTION - PAIN TYPE: TYPE: ACUTE PAIN

## 2019-06-20 ASSESSMENT — ENCOUNTER SYMPTOMS
EYE PAIN: 0
VOMITING: 0
EYE DISCHARGE: 0
SORE THROAT: 0
ABDOMINAL PAIN: 0
SHORTNESS OF BREATH: 0
COUGH: 0
NAUSEA: 1
EYE REDNESS: 0

## 2019-06-20 NOTE — ED PROVIDER NOTES
[facility] ED  [address] [address]  Phone: [phone]  Fax: [phone]      Pt Name: Clint Remy  MRN: [id number]  [place] 1975  Date of evaluation: 6/20/2019      CHIEF COMPLAINT       Chief Complaint   Patient presents with    Headache     \"migraine\" since Monday and has hx    Leg Pain     calf pain more in right than left with radiation to feet for a couple of months       HISTORY OF PRESENT ILLNESS   (Location, Quality, Severity, Duration, Timing, Context, ModifyingFactors, Associated Signs and Symptoms)     Clint Remy is a 41 y.o. female who presents to the ER for evaluation of bilateral leg pain and migraine headache. Patient states that for the past 2 months she has been suffering from leg pain. She states that the pain radiates down towards her feet. Pain is worse on the left than on the right. Patient recently had an EMG performed by her neurologist which showed neuropathy. Patient has not been placed on any medication for the neuropathy. She was instructed to follow-up with her primary care physician. Patient is not diabetic. Patient is also suffering from a migraine headache. Patient has a history of migraine headaches. She complains of pain over the right side of her head. She states that vision in the left eye is slightly blurry. She has had associated nausea, but no vomiting. She has had no recent head injuries. She denies fever and chills. She denies neck pain. She states that she took her regular home meds with no significant relief. Home medications include Maxalt, DHE nasal, Toradol and Phenergan. Patient rates her acute headache pain at 8/10. Patient's neurologist is Dr. Alondra Booth. Nursing Notes were reviewed. REVIEW OF SYSTEMS     (2-9 systems for level 4, 10 or more for level 5)    Review of Systems   Constitutional: Negative for chills and fever.    HENT: Negative for congestion, ear pain and sore throat. Eyes: Negative for pain, discharge and redness. Blurry vision - left eye. Respiratory: Negative for cough and shortness of breath. Cardiovascular: Negative for chest pain. Gastrointestinal: Positive for nausea. Negative for abdominal pain and vomiting. Genitourinary: Negative for decreased urine volume. Musculoskeletal: Negative for gait problem and neck pain. Bilateral leg pain. Skin: Negative for rash. Neurological: Positive for headaches. Negative for seizures and syncope. All other systems reviewed and are negative. PAST MEDICAL HISTORY    has a past medical history of Anxiety, Bladder disorder, CMT (Charcot-Melissa-Tooth disease), Depression, GERD (gastroesophageal reflux disease), Hypertension, Migraines, Neuropathy, Neuropathy involving both lower extremities, and Psychiatric problem. SURGICAL HISTORY      has a past surgical history that includes Bladder surgery; Neck surgery (08/2012); Hysterectomy; Cholecystectomy; Knee arthroscopy (Bilateral); back surgery; Colonoscopy; Endoscopy, colon, diagnostic; Dilatation, esophagus; Cervical spine surgery; and Spine surgery. CURRENT MEDICATIONS       Previous Medications    CLONAZEPAM (KLONOPIN) 1 MG TABLET    Take 1 mg by mouth 3 times daily as needed. DIHYDROERGOTAMINE (D.H.E. 45) 1 MG/ML INJECTION    Infuse 1 mg intravenously 4 times daily as needed    ESZOPICLONE (LUNESTA) 3 MG TABS    Take 3 mg by mouth nightly.     GALCANEZUMAB-GNLM (EMGALITY SC)    Inject into the skin    METOPROLOL SUCCINATE (TOPROL XL) 50 MG EXTENDED RELEASE TABLET    Take 50 mg by mouth daily    OXCARBAZEPINE (TRILEPTAL) 300 MG TABLET    Take 300 mg by mouth 2 times daily     PROMETHAZINE (PHENERGAN) 12.5 MG TABLET    Take 25 mg by mouth     RIZATRIPTAN (MAXALT) 10 MG TABLET        TIZANIDINE (ZANAFLEX) 4 MG TABLET    Take 2 mg by mouth every 6 hours as needed (2 mg in AM, 2mg at noon and 4mg at bedtime)      ALLERGIES     is allergic to found for this visit on 06/20/19. MDM:   The patient presents with headache without signs of CNS bleed, stroke, infection, temporal arteritis, idiopathic intracranial hypertension, or other serious etiology. The patient is neurologically intact. Given the extremely low risk of these diagnoses further testing and evaluation for these possibilities does not appear to be indicated at this time. He states acute headache pain will be treated with IV fluids, IV Toradol, IV Phenergan, and IV Benadryl. Differential diagnoses: tension headache, migraine headache, cluster headache, sinusitis; intracranial pathologies: brain tumor, SAH, encephalitis, meningitis, stroke. EMERGENCY DEPARTMENT COURSE:   Vitals:    Vitals:    06/20/19 1408 06/20/19 1440   BP: (!) 148/99 134/79   Pulse: 90    Resp: 16    Temp: 98.4 °F (36.9 °C)    TempSrc: Oral    SpO2: 96%    Weight: 95.7 kg (211 lb)    Height: 5' 2\" (1.575 m)      -------------------------  BP: 134/79, Temp: 98.4 °F (36.9 °C), Pulse: 90, Resp: 16    The patient was given the following medications:  Orders Placed This Encounter   Medications    0.9 % sodium chloride bolus    ketorolac (TORADOL) injection 15 mg    diphenhydrAMINE (BENADRYL) injection 50 mg    promethazine (PHENERGAN) injection 12.5 mg    nalbuphine (NUBAIN) injection 10 mg    dexamethasone (DECADRON) injection 4 mg      Re-evaluation Notes  3:46 PM.  Patient states that her headache pain is unchanged. Patient will be treated with IV Decadron and IV Nubain. 4:43 PM.  Patient's headache pain has shown improvement. Nausea has resolved. Patient has had no vomiting during her ER course. Patient will be discharged home. I have recommended that she sleep for the next several hours. She understands to follow-up with her primary care doctor concerning neuropathy in the lower extremities in the next 2 to 3 days.   Patient to return to the ER for return of headache, numbness or weakness in her extremities, change in vision, development of fever above 101 °F, or any other concerning symptomatology. FINAL IMPRESSION      1. Migraine syndrome    2.  Leg pain, bilateral        DISPOSITION/PLAN   DISPOSITION - home     Condition on Disposition  Stable    PATIENT REFERRED TO:  DO Donna Hassan  [address]  [phone]    Schedule an appointment as soon as possible for a visit   For reevaluation in 2-3 days    DISCHARGE MEDICATIONS:  New Prescriptions    No medications on file     (Please note that portions of this note were completed with a voice recognition program.  Efforts were made to edit the dictations but occasionally words are mis-transcribed.)    Carmen Wiggins PA-C  06/20/19 3233

## 2019-06-22 NOTE — ED PROVIDER NOTES
Emergency Department     Faculty Attestation    I performed a history and physical examination of the patient and discussed management with the mid level provideer. I reviewed the mid level provider's note and agree with the documented findings and plan of care. Any areas of disagreement are noted on the chart. I was personally present for the key portions of any procedures. I have documented in the chart those procedures where I was not present during the key portions. I have reviewed the emergency nurses triage note. I agree with the chief complaint, past medical history, past surgical history, allergies, medications, social and family history as documented unless otherwise noted below. Documentation of the HPI, Physical Exam and Medical Decision Making performed by medical students or scribes is based on my personal performance of the HPI, PE and MDM. For Physician Assistant/ Nurse Practitioner cases/documentation I have personally evaluated this patient and have completed at least one if not all key elements of the E/M (history, physical exam, and MDM). Additional findings are as noted. Primary Care Physician:  Maine Murguia, [address] [facility]       Chief Complaint   Patient presents with    Headache     \"migraine\" since Monday and has hx    Leg Pain     calf pain more in right than left with radiation to feet for a couple of months       RECENT VITALS:   Temp: 98.4 °F (36.9 °C),  Pulse: 90, Resp: 16, BP: 134/79    LABS:  Labs Reviewed - No data to display      PERTINENT ATTENDING PHYSICIAN COMMENTS:      Patient has chronic HA's and this one is a typical HA for the patient. Gradual in onset and not \"the worst HA of life\". Denies fevers/chills/IVDA. Physical exam:  CTA Bi, RRR, abd soft/nt/nd/+ BS.    Neuro: CN II-XII intact, 5/5 strength bilaterally, sensation to light touch intact bilaterally, Down going babinski's bilaterally, DTR's are 2+ bilaterally and equal.  Absolutely no meningismus and is nontoxic in appearance. No tenderness to palpation of the temporal arteries, with strong temporal artery pulses palpated. PERRL bilaterally. Plan:  Pain control and reeval after.            Mark Booth MD  06/22/19 0334

## 2019-07-01 ENCOUNTER — HOSPITAL ENCOUNTER (EMERGENCY)
Age: 44
Discharge: HOME | End: 2019-07-01
Attending: EMERGENCY MEDICINE
Payer: MEDICARE

## 2019-07-01 VITALS
HEIGHT: 62 IN | TEMPERATURE: 98.4 F | BODY MASS INDEX: 38.83 KG/M2 | HEART RATE: 83 BPM | RESPIRATION RATE: 14 BRPM | DIASTOLIC BLOOD PRESSURE: 98 MMHG | WEIGHT: 211 LBS | SYSTOLIC BLOOD PRESSURE: 153 MMHG | OXYGEN SATURATION: 98 %

## 2019-07-01 DIAGNOSIS — G43.909 MIGRAINE WITHOUT STATUS MIGRAINOSUS, NOT INTRACTABLE, UNSPECIFIED MIGRAINE TYPE: Primary | ICD-10-CM

## 2019-07-01 PROCEDURE — 96374 THER/PROPH/DIAG INJ IV PUSH: CPT

## 2019-07-01 PROCEDURE — 99283 EMERGENCY DEPT VISIT LOW MDM: CPT

## 2019-07-01 PROCEDURE — 96375 TX/PRO/DX INJ NEW DRUG ADDON: CPT

## 2019-07-01 PROCEDURE — 96376 TX/PRO/DX INJ SAME DRUG ADON: CPT

## 2019-07-01 PROCEDURE — 6360000002 HC RX W HCPCS: Performed by: EMERGENCY MEDICINE

## 2019-07-01 PROCEDURE — 2580000003 HC RX 258: Performed by: EMERGENCY MEDICINE

## 2019-07-01 RX ORDER — 0.9 % SODIUM CHLORIDE 0.9 %
1000 INTRAVENOUS SOLUTION INTRAVENOUS ONCE
Status: COMPLETED | OUTPATIENT
Start: 2019-07-01 | End: 2019-07-01

## 2019-07-01 RX ORDER — DIPHENHYDRAMINE HYDROCHLORIDE 50 MG/ML
25 INJECTION INTRAMUSCULAR; INTRAVENOUS ONCE
Status: COMPLETED | OUTPATIENT
Start: 2019-07-01 | End: 2019-07-01

## 2019-07-01 RX ORDER — NALBUPHINE HCL 10 MG/ML
10 AMPUL (ML) INJECTION ONCE
Status: COMPLETED | OUTPATIENT
Start: 2019-07-01 | End: 2019-07-01

## 2019-07-01 RX ORDER — DEXAMETHASONE SODIUM PHOSPHATE 4 MG/ML
4 INJECTION, SOLUTION INTRA-ARTICULAR; INTRALESIONAL; INTRAMUSCULAR; INTRAVENOUS; SOFT TISSUE ONCE
Status: COMPLETED | OUTPATIENT
Start: 2019-07-01 | End: 2019-07-01

## 2019-07-01 RX ORDER — PROMETHAZINE HYDROCHLORIDE 25 MG/ML
12.5 INJECTION, SOLUTION INTRAMUSCULAR; INTRAVENOUS ONCE
Status: COMPLETED | OUTPATIENT
Start: 2019-07-01 | End: 2019-07-01

## 2019-07-01 RX ORDER — KETOROLAC TROMETHAMINE 15 MG/ML
15 INJECTION, SOLUTION INTRAMUSCULAR; INTRAVENOUS ONCE
Status: COMPLETED | OUTPATIENT
Start: 2019-07-01 | End: 2019-07-01

## 2019-07-01 RX ADMIN — DEXAMETHASONE SODIUM PHOSPHATE 4 MG: 4 INJECTION, SOLUTION INTRAMUSCULAR; INTRAVENOUS at 19:30

## 2019-07-01 RX ADMIN — NALBUPHINE HYDROCHLORIDE 10 MG: 10 INJECTION, SOLUTION INTRAMUSCULAR; INTRAVENOUS; SUBCUTANEOUS at 19:28

## 2019-07-01 RX ADMIN — PROMETHAZINE HYDROCHLORIDE 12.5 MG: 25 INJECTION INTRAMUSCULAR; INTRAVENOUS at 19:30

## 2019-07-01 RX ADMIN — KETOROLAC TROMETHAMINE 15 MG: 15 INJECTION, SOLUTION INTRAMUSCULAR; INTRAVENOUS at 20:01

## 2019-07-01 RX ADMIN — NALBUPHINE HYDROCHLORIDE 10 MG: 10 INJECTION, SOLUTION INTRAMUSCULAR; INTRAVENOUS; SUBCUTANEOUS at 20:34

## 2019-07-01 RX ADMIN — SODIUM CHLORIDE 1000 ML: 9 INJECTION, SOLUTION INTRAVENOUS at 19:27

## 2019-07-01 RX ADMIN — DIPHENHYDRAMINE HYDROCHLORIDE 25 MG: 50 INJECTION, SOLUTION INTRAMUSCULAR; INTRAVENOUS at 20:02

## 2019-07-01 ASSESSMENT — PAIN SCALES - GENERAL
PAINLEVEL_OUTOF10: 7
PAINLEVEL_OUTOF10: 8
PAINLEVEL_OUTOF10: 8

## 2019-07-01 ASSESSMENT — PAIN DESCRIPTION - ORIENTATION: ORIENTATION: LEFT

## 2019-07-01 ASSESSMENT — PAIN DESCRIPTION - PAIN TYPE: TYPE: ACUTE PAIN

## 2019-07-01 ASSESSMENT — PAIN DESCRIPTION - PROGRESSION
CLINICAL_PROGRESSION: GRADUALLY IMPROVING

## 2019-07-01 ASSESSMENT — PAIN DESCRIPTION - LOCATION: LOCATION: HEAD

## 2019-07-01 NOTE — ED PROVIDER NOTES
[address] [facility]  eMERGENCY dEPARTMENT eNCOUnter      Pt Name: Mohan Fernando  MRN: [id number]  [place] 1975  Date of evaluation: 7/1/2019      CHIEF COMPLAINT       Chief Complaint   Patient presents with    Migraine     Since Friday. Causing her to feel anxious and dizzy. Typical headache on left side of head. Unknown trigger. HISTORY OF PRESENT ILLNESS      The patient presents for migraine. It started on Friday. It is now Monday. The patient used Excedrin Migraine to start. A day or so ago, it got worse and she took her normal migraine medicine. She is having pain on the left side of her forehead. She's had nausea and photosensitivity. She denies focal weakness or numbness. This is a typical migraine headache for the patient. Nothing makes her symptoms better or worse otherwise. REVIEW OF SYSTEMS       All systems reviewed and negative unless noted in HPI. The patient denies fever or constitutional symptoms. Denies vision change. Migraine as noted in HPI. Denies any sore throat or rhinorrhea. Denies any neck pain or stiffness. Denies chest pain or shortness of breath. Nausea and vomiting with headache symptoms. Denies any dysuria. Denies urinary frequency or hematuria. Denies musculoskeletal injury or pain. Denies any weakness, numbness or focal neurologic deficit. Denies any skin rash or edema. Reports exacerbation of anxiety with migraine. No easy bruising or bleeding. Denies any polyuria, polydypsia or history of immunocompromise. PAST MEDICAL HISTORY    has a past medical history of Anxiety, Bladder disorder, CMT (Charcot-Melissa-Tooth disease), Depression, GERD (gastroesophageal reflux disease), Hypertension, Migraines, Neuropathy, Neuropathy involving both lower extremities, and Psychiatric problem. SURGICAL HISTORY      has a past surgical history that includes Bladder surgery; Neck surgery (08/2012);  Hysterectomy; DO Donna Pimentel [address] [facility]  [phone]    In 2 days        DISCHARGE MEDICATIONS:  New Prescriptions    No medications on file       (Please note that portions of this note were completed with a voice recognition program.  Efforts were made to edit the dictations but occasionally words are mis-transcribed.)    P[place] MD   Attending Emergency Physician         Mynor Moore MD  07/01/19 5552

## 2019-07-10 ENCOUNTER — HOSPITAL ENCOUNTER (EMERGENCY)
Age: 44
Discharge: HOME | End: 2019-07-10
Attending: EMERGENCY MEDICINE
Payer: MEDICARE

## 2019-07-10 VITALS
HEIGHT: 62 IN | TEMPERATURE: 97.5 F | HEART RATE: 60 BPM | RESPIRATION RATE: 16 BRPM | WEIGHT: 211 LBS | SYSTOLIC BLOOD PRESSURE: 116 MMHG | DIASTOLIC BLOOD PRESSURE: 73 MMHG | BODY MASS INDEX: 38.83 KG/M2 | OXYGEN SATURATION: 95 %

## 2019-07-10 DIAGNOSIS — G89.29 CHRONIC INTRACTABLE HEADACHE, UNSPECIFIED HEADACHE TYPE: Primary | ICD-10-CM

## 2019-07-10 DIAGNOSIS — R51.9 CHRONIC INTRACTABLE HEADACHE, UNSPECIFIED HEADACHE TYPE: Primary | ICD-10-CM

## 2019-07-10 PROCEDURE — 6360000002 HC RX W HCPCS: Performed by: EMERGENCY MEDICINE

## 2019-07-10 PROCEDURE — 2580000003 HC RX 258: Performed by: PHYSICIAN ASSISTANT

## 2019-07-10 PROCEDURE — 96361 HYDRATE IV INFUSION ADD-ON: CPT

## 2019-07-10 PROCEDURE — 6360000002 HC RX W HCPCS: Performed by: PHYSICIAN ASSISTANT

## 2019-07-10 PROCEDURE — 96375 TX/PRO/DX INJ NEW DRUG ADDON: CPT

## 2019-07-10 PROCEDURE — 96374 THER/PROPH/DIAG INJ IV PUSH: CPT

## 2019-07-10 PROCEDURE — 99283 EMERGENCY DEPT VISIT LOW MDM: CPT

## 2019-07-10 PROCEDURE — 96376 TX/PRO/DX INJ SAME DRUG ADON: CPT

## 2019-07-10 RX ORDER — NALBUPHINE HCL 10 MG/ML
10 AMPUL (ML) INJECTION ONCE
Status: COMPLETED | OUTPATIENT
Start: 2019-07-10 | End: 2019-07-10

## 2019-07-10 RX ORDER — DIPHENHYDRAMINE HYDROCHLORIDE 50 MG/ML
25 INJECTION INTRAMUSCULAR; INTRAVENOUS ONCE
Status: COMPLETED | OUTPATIENT
Start: 2019-07-10 | End: 2019-07-10

## 2019-07-10 RX ORDER — TOPIRAMATE 25 MG/1
200 TABLET ORAL 2 TIMES DAILY
COMMUNITY

## 2019-07-10 RX ORDER — KETOROLAC TROMETHAMINE 15 MG/ML
30 INJECTION, SOLUTION INTRAMUSCULAR; INTRAVENOUS ONCE
Status: COMPLETED | OUTPATIENT
Start: 2019-07-10 | End: 2019-07-10

## 2019-07-10 RX ORDER — DEXAMETHASONE SODIUM PHOSPHATE 10 MG/ML
10 INJECTION INTRAMUSCULAR; INTRAVENOUS ONCE
Status: COMPLETED | OUTPATIENT
Start: 2019-07-10 | End: 2019-07-10

## 2019-07-10 RX ORDER — 0.9 % SODIUM CHLORIDE 0.9 %
1000 INTRAVENOUS SOLUTION INTRAVENOUS ONCE
Status: COMPLETED | OUTPATIENT
Start: 2019-07-10 | End: 2019-07-10

## 2019-07-10 RX ORDER — PROMETHAZINE HYDROCHLORIDE 25 MG/ML
12.5 INJECTION, SOLUTION INTRAMUSCULAR; INTRAVENOUS ONCE
Status: COMPLETED | OUTPATIENT
Start: 2019-07-10 | End: 2019-07-10

## 2019-07-10 RX ADMIN — NALBUPHINE HYDROCHLORIDE 10 MG: 10 INJECTION, SOLUTION INTRAMUSCULAR; INTRAVENOUS; SUBCUTANEOUS at 21:18

## 2019-07-10 RX ADMIN — DEXAMETHASONE SODIUM PHOSPHATE 10 MG: 10 INJECTION INTRAMUSCULAR; INTRAVENOUS at 20:26

## 2019-07-10 RX ADMIN — PROMETHAZINE HYDROCHLORIDE 12.5 MG: 25 INJECTION INTRAMUSCULAR; INTRAVENOUS at 20:26

## 2019-07-10 RX ADMIN — KETOROLAC TROMETHAMINE 30 MG: 15 INJECTION, SOLUTION INTRAMUSCULAR; INTRAVENOUS at 20:27

## 2019-07-10 RX ADMIN — SODIUM CHLORIDE 1000 ML: 9 INJECTION, SOLUTION INTRAVENOUS at 20:26

## 2019-07-10 RX ADMIN — NALBUPHINE HYDROCHLORIDE 10 MG: 10 INJECTION, SOLUTION INTRAMUSCULAR; INTRAVENOUS; SUBCUTANEOUS at 22:34

## 2019-07-10 RX ADMIN — DIPHENHYDRAMINE HYDROCHLORIDE 25 MG: 50 INJECTION, SOLUTION INTRAMUSCULAR; INTRAVENOUS at 20:27

## 2019-07-10 ASSESSMENT — PAIN DESCRIPTION - PAIN TYPE: TYPE: ACUTE PAIN

## 2019-07-10 ASSESSMENT — PAIN SCALES - GENERAL
PAINLEVEL_OUTOF10: 9
PAINLEVEL_OUTOF10: 8
PAINLEVEL_OUTOF10: 9
PAINLEVEL_OUTOF10: 8
PAINLEVEL_OUTOF10: 9
PAINLEVEL_OUTOF10: 9

## 2019-07-10 ASSESSMENT — PAIN DESCRIPTION - PROGRESSION: CLINICAL_PROGRESSION: GRADUALLY IMPROVING

## 2019-07-10 ASSESSMENT — PAIN DESCRIPTION - LOCATION: LOCATION: HEAD

## 2019-07-11 NOTE — ED PROVIDER NOTES
program.  Efforts were made to edit the dictations but occasionally words are mis-transcribed.)        Chago Glass PA-C  07/12/19 6775

## 2019-07-11 NOTE — ED NOTES
Room darkened, call light in reach,  remains at bedside, continuous pulse ox placed.       Maldonado Adame RN  07/10/19 2049

## 2019-07-14 ENCOUNTER — HOSPITAL ENCOUNTER (EMERGENCY)
Age: 44
Discharge: HOME | End: 2019-07-14
Attending: EMERGENCY MEDICINE
Payer: MEDICARE

## 2019-07-14 VITALS
HEIGHT: 62 IN | RESPIRATION RATE: 20 BRPM | SYSTOLIC BLOOD PRESSURE: 107 MMHG | HEART RATE: 77 BPM | BODY MASS INDEX: 38.83 KG/M2 | OXYGEN SATURATION: 96 % | WEIGHT: 211 LBS | TEMPERATURE: 97.9 F | DIASTOLIC BLOOD PRESSURE: 79 MMHG

## 2019-07-14 DIAGNOSIS — G43.909 MIGRAINE WITHOUT STATUS MIGRAINOSUS, NOT INTRACTABLE, UNSPECIFIED MIGRAINE TYPE: Primary | ICD-10-CM

## 2019-07-14 PROCEDURE — 96375 TX/PRO/DX INJ NEW DRUG ADDON: CPT

## 2019-07-14 PROCEDURE — 96374 THER/PROPH/DIAG INJ IV PUSH: CPT

## 2019-07-14 PROCEDURE — 6360000002 HC RX W HCPCS: Performed by: NURSE PRACTITIONER

## 2019-07-14 PROCEDURE — 99282 EMERGENCY DEPT VISIT SF MDM: CPT

## 2019-07-14 PROCEDURE — 2580000003 HC RX 258: Performed by: NURSE PRACTITIONER

## 2019-07-14 RX ORDER — METOCLOPRAMIDE HYDROCHLORIDE 5 MG/ML
10 INJECTION INTRAMUSCULAR; INTRAVENOUS ONCE
Status: COMPLETED | OUTPATIENT
Start: 2019-07-14 | End: 2019-07-14

## 2019-07-14 RX ORDER — DEXAMETHASONE SODIUM PHOSPHATE 10 MG/ML
10 INJECTION INTRAMUSCULAR; INTRAVENOUS ONCE
Status: COMPLETED | OUTPATIENT
Start: 2019-07-14 | End: 2019-07-14

## 2019-07-14 RX ORDER — 0.9 % SODIUM CHLORIDE 0.9 %
1000 INTRAVENOUS SOLUTION INTRAVENOUS ONCE
Status: COMPLETED | OUTPATIENT
Start: 2019-07-14 | End: 2019-07-14

## 2019-07-14 RX ORDER — DIPHENHYDRAMINE HYDROCHLORIDE 50 MG/ML
25 INJECTION INTRAMUSCULAR; INTRAVENOUS ONCE
Status: COMPLETED | OUTPATIENT
Start: 2019-07-14 | End: 2019-07-14

## 2019-07-14 RX ADMIN — DIPHENHYDRAMINE HYDROCHLORIDE 25 MG: 50 INJECTION, SOLUTION INTRAMUSCULAR; INTRAVENOUS at 14:25

## 2019-07-14 RX ADMIN — SODIUM CHLORIDE 1000 ML: 9 INJECTION, SOLUTION INTRAVENOUS at 14:24

## 2019-07-14 RX ADMIN — METOCLOPRAMIDE 10 MG: 5 INJECTION, SOLUTION INTRAMUSCULAR; INTRAVENOUS at 14:25

## 2019-07-14 RX ADMIN — DEXAMETHASONE SODIUM PHOSPHATE 10 MG: 10 INJECTION INTRAMUSCULAR; INTRAVENOUS at 14:26

## 2019-07-14 ASSESSMENT — PAIN DESCRIPTION - LOCATION: LOCATION: HEAD

## 2019-07-14 ASSESSMENT — PAIN DESCRIPTION - PAIN TYPE: TYPE: CHRONIC PAIN

## 2019-07-14 ASSESSMENT — PAIN DESCRIPTION - FREQUENCY: FREQUENCY: CONTINUOUS

## 2019-07-14 ASSESSMENT — PAIN DESCRIPTION - PROGRESSION: CLINICAL_PROGRESSION: NOT CHANGED

## 2019-07-14 ASSESSMENT — PAIN DESCRIPTION - ONSET: ONSET: ON-GOING

## 2019-07-14 ASSESSMENT — ENCOUNTER SYMPTOMS
NAUSEA: 1
VOMITING: 1
PHOTOPHOBIA: 1

## 2019-07-14 ASSESSMENT — PAIN DESCRIPTION - DESCRIPTORS: DESCRIPTORS: SHARP;SHOOTING

## 2019-07-14 ASSESSMENT — PAIN SCALES - GENERAL: PAINLEVEL_OUTOF10: 10

## 2019-07-14 NOTE — ED NOTES
Pt arrived to the ED with a migraine on the R side of her head radiating down her neck to her R arm. Pt states her pain has been continuous for around a week. Pt states her arm tingles occasionally. Pt states she has been vomiting, dizzy, and had blurry vision for around a week. Pt states she has been hospitalized prior for pain control. Pt rates pain 10/10. Pt alert and oriented x4. Respirations even and unlabored. Will continue to monitor.       Faviola Rodriguez RN  07/14/19 [id number]

## 2019-07-14 NOTE — ED PROVIDER NOTES
Whenever words are used in this note in any gender, they shall be construed as though they were used in the gender appropriate to the circumstances; and whenever words are used in this note in the singular or plural form, they shall be construed as though they were used in the form appropriate to the circumstances.)    Cheikh Anderson MD [facility]  Attending Emergency Medicine Physician              Braulio Cerrato MD  07/14/19 1478

## 2019-11-25 ENCOUNTER — HOSPITAL ENCOUNTER (EMERGENCY)
Age: 44
Discharge: HOME | End: 2019-11-25
Attending: EMERGENCY MEDICINE
Payer: COMMERCIAL

## 2019-11-25 VITALS
HEIGHT: 62 IN | BODY MASS INDEX: 33.31 KG/M2 | OXYGEN SATURATION: 98 % | DIASTOLIC BLOOD PRESSURE: 100 MMHG | TEMPERATURE: 98.2 F | WEIGHT: 181 LBS | SYSTOLIC BLOOD PRESSURE: 146 MMHG | HEART RATE: 78 BPM | RESPIRATION RATE: 15 BRPM

## 2019-11-25 DIAGNOSIS — G43.919 INTRACTABLE MIGRAINE WITHOUT STATUS MIGRAINOSUS, UNSPECIFIED MIGRAINE TYPE: Primary | ICD-10-CM

## 2019-11-25 PROCEDURE — 2580000003 HC RX 258: Performed by: EMERGENCY MEDICINE

## 2019-11-25 PROCEDURE — 6360000002 HC RX W HCPCS: Performed by: EMERGENCY MEDICINE

## 2019-11-25 PROCEDURE — 96375 TX/PRO/DX INJ NEW DRUG ADDON: CPT

## 2019-11-25 PROCEDURE — 96374 THER/PROPH/DIAG INJ IV PUSH: CPT

## 2019-11-25 PROCEDURE — 99284 EMERGENCY DEPT VISIT MOD MDM: CPT

## 2019-11-25 RX ORDER — DIPHENHYDRAMINE HYDROCHLORIDE 50 MG/ML
25 INJECTION INTRAMUSCULAR; INTRAVENOUS ONCE
Status: COMPLETED | OUTPATIENT
Start: 2019-11-25 | End: 2019-11-25

## 2019-11-25 RX ORDER — MORPHINE SULFATE 4 MG/ML
4 INJECTION, SOLUTION INTRAMUSCULAR; INTRAVENOUS ONCE
Status: COMPLETED | OUTPATIENT
Start: 2019-11-25 | End: 2019-11-25

## 2019-11-25 RX ORDER — DEXAMETHASONE SODIUM PHOSPHATE 4 MG/ML
4 INJECTION, SOLUTION INTRA-ARTICULAR; INTRALESIONAL; INTRAMUSCULAR; INTRAVENOUS; SOFT TISSUE ONCE
Status: COMPLETED | OUTPATIENT
Start: 2019-11-25 | End: 2019-11-25

## 2019-11-25 RX ORDER — 0.9 % SODIUM CHLORIDE 0.9 %
1000 INTRAVENOUS SOLUTION INTRAVENOUS ONCE
Status: COMPLETED | OUTPATIENT
Start: 2019-11-25 | End: 2019-11-25

## 2019-11-25 RX ORDER — PROMETHAZINE HYDROCHLORIDE 25 MG/ML
12.5 INJECTION, SOLUTION INTRAMUSCULAR; INTRAVENOUS ONCE
Status: COMPLETED | OUTPATIENT
Start: 2019-11-25 | End: 2019-11-25

## 2019-11-25 RX ORDER — KETOROLAC TROMETHAMINE 15 MG/ML
15 INJECTION, SOLUTION INTRAMUSCULAR; INTRAVENOUS ONCE
Status: COMPLETED | OUTPATIENT
Start: 2019-11-25 | End: 2019-11-25

## 2019-11-25 RX ORDER — NALBUPHINE HCL 10 MG/ML
10 AMPUL (ML) INJECTION ONCE
Status: DISCONTINUED | OUTPATIENT
Start: 2019-11-25 | End: 2019-11-25 | Stop reason: HOSPADM

## 2019-11-25 RX ADMIN — PROMETHAZINE HYDROCHLORIDE 12.5 MG: 25 INJECTION INTRAMUSCULAR; INTRAVENOUS at 07:33

## 2019-11-25 RX ADMIN — MORPHINE SULFATE 4 MG: 4 INJECTION INTRAVENOUS at 08:38

## 2019-11-25 RX ADMIN — SODIUM CHLORIDE 1000 ML: 9 INJECTION, SOLUTION INTRAVENOUS at 07:33

## 2019-11-25 RX ADMIN — KETOROLAC TROMETHAMINE 15 MG: 15 INJECTION, SOLUTION INTRAMUSCULAR; INTRAVENOUS at 07:34

## 2019-11-25 RX ADMIN — DIPHENHYDRAMINE HYDROCHLORIDE 25 MG: 50 INJECTION, SOLUTION INTRAMUSCULAR; INTRAVENOUS at 07:34

## 2019-11-25 RX ADMIN — DEXAMETHASONE SODIUM PHOSPHATE 4 MG: 4 INJECTION, SOLUTION INTRAMUSCULAR; INTRAVENOUS at 07:33

## 2019-11-25 ASSESSMENT — PAIN DESCRIPTION - ONSET
ONSET: GRADUAL
ONSET: GRADUAL

## 2019-11-25 ASSESSMENT — PAIN SCALES - GENERAL
PAINLEVEL_OUTOF10: 9
PAINLEVEL_OUTOF10: 8
PAINLEVEL_OUTOF10: 9

## 2019-11-25 ASSESSMENT — PAIN DESCRIPTION - LOCATION
LOCATION: HEAD
LOCATION: HEAD

## 2019-11-25 ASSESSMENT — PAIN DESCRIPTION - FREQUENCY
FREQUENCY: CONTINUOUS
FREQUENCY: CONTINUOUS

## 2019-11-25 ASSESSMENT — PAIN DESCRIPTION - PROGRESSION
CLINICAL_PROGRESSION: GRADUALLY IMPROVING
CLINICAL_PROGRESSION: GRADUALLY WORSENING
CLINICAL_PROGRESSION: NOT CHANGED

## 2019-11-25 ASSESSMENT — PAIN DESCRIPTION - PAIN TYPE
TYPE: ACUTE PAIN

## 2019-11-25 ASSESSMENT — PAIN - FUNCTIONAL ASSESSMENT
PAIN_FUNCTIONAL_ASSESSMENT: PREVENTS OR INTERFERES WITH ALL ACTIVE AND SOME PASSIVE ACTIVITIES
PAIN_FUNCTIONAL_ASSESSMENT: PREVENTS OR INTERFERES SOME ACTIVE ACTIVITIES AND ADLS
PAIN_FUNCTIONAL_ASSESSMENT: PREVENTS OR INTERFERES WITH ALL ACTIVE AND SOME PASSIVE ACTIVITIES

## 2019-11-25 ASSESSMENT — PAIN DESCRIPTION - DESCRIPTORS: DESCRIPTORS: ACHING;HEADACHE

## 2019-11-25 ASSESSMENT — PAIN DESCRIPTION - ORIENTATION: ORIENTATION: RIGHT

## 2020-01-22 ENCOUNTER — APPOINTMENT (OUTPATIENT)
Dept: GENERAL RADIOLOGY | Age: 45
End: 2020-01-22
Payer: COMMERCIAL

## 2020-01-22 ENCOUNTER — HOSPITAL ENCOUNTER (EMERGENCY)
Age: 45
Discharge: HOME | End: 2020-01-23
Attending: EMERGENCY MEDICINE
Payer: COMMERCIAL

## 2020-01-22 VITALS
TEMPERATURE: 98.4 F | SYSTOLIC BLOOD PRESSURE: 138 MMHG | WEIGHT: 194 LBS | RESPIRATION RATE: 14 BRPM | HEART RATE: 88 BPM | DIASTOLIC BLOOD PRESSURE: 108 MMHG | HEIGHT: 62 IN | OXYGEN SATURATION: 97 % | BODY MASS INDEX: 35.7 KG/M2

## 2020-01-22 PROCEDURE — 6360000002 HC RX W HCPCS: Performed by: EMERGENCY MEDICINE

## 2020-01-22 PROCEDURE — 99284 EMERGENCY DEPT VISIT MOD MDM: CPT

## 2020-01-22 PROCEDURE — 96372 THER/PROPH/DIAG INJ SC/IM: CPT

## 2020-01-22 PROCEDURE — 2580000003 HC RX 258: Performed by: PHYSICIAN ASSISTANT

## 2020-01-22 PROCEDURE — 96375 TX/PRO/DX INJ NEW DRUG ADDON: CPT

## 2020-01-22 PROCEDURE — 96361 HYDRATE IV INFUSION ADD-ON: CPT

## 2020-01-22 PROCEDURE — 72040 X-RAY EXAM NECK SPINE 2-3 VW: CPT

## 2020-01-22 PROCEDURE — 96374 THER/PROPH/DIAG INJ IV PUSH: CPT

## 2020-01-22 PROCEDURE — 6360000002 HC RX W HCPCS: Performed by: PHYSICIAN ASSISTANT

## 2020-01-22 RX ORDER — DIPHENHYDRAMINE HYDROCHLORIDE 50 MG/ML
25 INJECTION INTRAMUSCULAR; INTRAVENOUS ONCE
Status: COMPLETED | OUTPATIENT
Start: 2020-01-22 | End: 2020-01-22

## 2020-01-22 RX ORDER — KETOROLAC TROMETHAMINE 15 MG/ML
15 INJECTION, SOLUTION INTRAMUSCULAR; INTRAVENOUS ONCE
Status: COMPLETED | OUTPATIENT
Start: 2020-01-22 | End: 2020-01-22

## 2020-01-22 RX ORDER — NALBUPHINE HCL 10 MG/ML
10 AMPUL (ML) INJECTION ONCE
Status: DISCONTINUED | OUTPATIENT
Start: 2020-01-22 | End: 2020-01-22

## 2020-01-22 RX ORDER — PROMETHAZINE HYDROCHLORIDE 25 MG/1
25 SUPPOSITORY RECTAL EVERY 6 HOURS PRN
Qty: 20 SUPPOSITORY | Refills: 0 | Status: SHIPPED | OUTPATIENT
Start: 2020-01-22 | End: 2020-01-29

## 2020-01-22 RX ORDER — ORPHENADRINE CITRATE 30 MG/ML
60 INJECTION INTRAMUSCULAR; INTRAVENOUS ONCE
Status: DISCONTINUED | OUTPATIENT
Start: 2020-01-22 | End: 2020-01-22

## 2020-01-22 RX ORDER — DEXAMETHASONE SODIUM PHOSPHATE 10 MG/ML
10 INJECTION INTRAMUSCULAR; INTRAVENOUS ONCE
Status: COMPLETED | OUTPATIENT
Start: 2020-01-22 | End: 2020-01-22

## 2020-01-22 RX ORDER — ORPHENADRINE CITRATE 30 MG/ML
60 INJECTION INTRAMUSCULAR; INTRAVENOUS ONCE
Status: COMPLETED | OUTPATIENT
Start: 2020-01-22 | End: 2020-01-22

## 2020-01-22 RX ORDER — 0.9 % SODIUM CHLORIDE 0.9 %
1000 INTRAVENOUS SOLUTION INTRAVENOUS ONCE
Status: COMPLETED | OUTPATIENT
Start: 2020-01-22 | End: 2020-01-22

## 2020-01-22 RX ORDER — METOCLOPRAMIDE HYDROCHLORIDE 5 MG/ML
10 INJECTION INTRAMUSCULAR; INTRAVENOUS ONCE
Status: COMPLETED | OUTPATIENT
Start: 2020-01-22 | End: 2020-01-22

## 2020-01-22 RX ORDER — HALOPERIDOL 5 MG/ML
5 INJECTION INTRAMUSCULAR ONCE
Status: COMPLETED | OUTPATIENT
Start: 2020-01-22 | End: 2020-01-22

## 2020-01-22 RX ADMIN — SODIUM CHLORIDE 1000 ML: 9 INJECTION, SOLUTION INTRAVENOUS at 20:58

## 2020-01-22 RX ADMIN — DIPHENHYDRAMINE HYDROCHLORIDE 25 MG: 50 INJECTION, SOLUTION INTRAMUSCULAR; INTRAVENOUS at 23:22

## 2020-01-22 RX ADMIN — KETOROLAC TROMETHAMINE 15 MG: 15 INJECTION, SOLUTION INTRAMUSCULAR; INTRAVENOUS at 20:58

## 2020-01-22 RX ADMIN — ORPHENADRINE CITRATE 60 MG: 30 INJECTION INTRAMUSCULAR; INTRAVENOUS at 20:59

## 2020-01-22 RX ADMIN — HALOPERIDOL LACTATE 5 MG: 5 INJECTION, SOLUTION INTRAMUSCULAR at 23:22

## 2020-01-22 RX ADMIN — DEXAMETHASONE SODIUM PHOSPHATE 10 MG: 10 INJECTION INTRAMUSCULAR; INTRAVENOUS at 20:58

## 2020-01-22 RX ADMIN — METOCLOPRAMIDE 10 MG: 5 INJECTION, SOLUTION INTRAMUSCULAR; INTRAVENOUS at 20:58

## 2020-01-22 ASSESSMENT — PAIN DESCRIPTION - LOCATION: LOCATION: HEAD

## 2020-01-22 ASSESSMENT — PAIN DESCRIPTION - PROGRESSION
CLINICAL_PROGRESSION: GRADUALLY IMPROVING
CLINICAL_PROGRESSION: GRADUALLY IMPROVING

## 2020-01-22 ASSESSMENT — PAIN SCALES - GENERAL
PAINLEVEL_OUTOF10: 2
PAINLEVEL_OUTOF10: 9
PAINLEVEL_OUTOF10: 9
PAINLEVEL_OUTOF10: 5

## 2020-01-22 ASSESSMENT — PAIN DESCRIPTION - PAIN TYPE: TYPE: ACUTE PAIN

## 2020-01-23 NOTE — ED PROVIDER NOTES
home medications that are prescribed to her by her neurologist.  She tried getting in with her PCP and neurologist but could not get an appointment for another few days. The patient does not list any alleviating factors for her symptoms. She denies fever, chills, vision changes, chest pain, shortness of breath, abdominal pain, urinary/bowel symptoms, focal weakness, numbness, tingling, recent injury or illness. PAST MEDICAL HISTORY    has a past medical history of Anxiety, Bladder disorder, CMT (Charcot-Melissa-Tooth disease), Depression, GERD (gastroesophageal reflux disease), Hypertension, Migraines, Neuropathy, Neuropathy involving both lower extremities, and Psychiatric problem. SURGICAL HISTORY      has a past surgical history that includes Bladder surgery; Neck surgery (2012); Hysterectomy; Cholecystectomy; Knee arthroscopy (Bilateral); back surgery; Colonoscopy; Endoscopy, colon, diagnostic; Dilatation, esophagus; Cervical spine surgery; and Spine surgery. CURRENT MEDICATIONS       Discharge Medication List as of 2020 11:54 PM      CONTINUE these medications which have NOT CHANGED    Details   topiramate (TOPAMAX) 25 MG tablet Take 200 mg by mouth 2 times daily Historical Med      tiZANidine (ZANAFLEX) 4 MG tablet Take 2 mg by mouth every 6 hours as needed (2 mg in AM, 2mg at noon and 4mg at bedtime) Historical Med      rizatriptan (MAXALT) 10 MG tablet Historical Med      promethazine (PHENERGAN) 12.5 MG tablet Take 25 mg by mouth Historical Med             ALLERGIES     is allergic to latex; azithromycin; lamictal [lamotrigine]; pcn [penicillins]; compazine [prochlorperazine maleate]; lexapro [escitalopram oxalate]; buspar [buspirone]; and zofran [ondansetron hcl]. FAMILY HISTORY     She indicated that her mother is alive. She indicated that her father is . She indicated that her brother is alive. She reported the following about her maternal grandmother: has CMT syndrome.  She open-mouth odontoid views intact. Stimulator leads are noted in the posterior cervical region on the right. No acute osseous abnormality of the cervical spine. Degenerative disc disease at C6-7. Previous fusion at C4-5 with no hardware complication. LABS:  No results found for this visit on 01/22/20. EMERGENCY DEPARTMENT COURSE:   Vitals:    Vitals:    01/22/20 1944 01/22/20 1945   BP:  (!) 138/108   Pulse:  88   Resp: 14 14   Temp:  98.4 °F (36.9 °C)   TempSrc: Oral Oral   SpO2:  97%   Weight: 88 kg (194 lb)    Height: 5' 2\" (1.575 m)      -------------------------  BP: (!) 138/108, Temp: 98.4 °F (36.9 °C), Pulse: 88, Resp: 14      PERTINENT ATTENDING PHYSICIAN COMMENTS:    Patient is a 61-year-old female who presents for evaluation of a migraine with paresthesias to her hands. Vital signs are stable. X-ray of the neck shows some degenerative changes and postsurgical changes but no acute process. She was treated with IV fluids, Decadron, Benadryl, Haldol, Toradol, Reglan and Norflex with improvement in her pain. I suspect her symptoms are secondary to her migraine headaches. She may have some radiculopathy but I have low suspicion for stroke or spinal cord injury. I instructed the patient to take her home medications as prescribed and to follow-up with her neurologist and PCP in 1 to 2 days. Instructed her to return to the ED for worsening symptoms or any other concern. At this time the patient is without objective evidence of an acute process requiring hospitalization or inpatient management. They have remained hemodynamically stable and are stable for discharge with outpatient follow-up. The patient is agreeable with plan and is requesting discharge. They were given the opportunity to ask questions and all questions were answered. The patient was given both written and verbal discharge instructions. The patient expressed understanding.          (Please note that portions of this note were completed with a voice recognition program.  Efforts were made to edit the dictations but occasionally words are mis-transcribed.)    Audrey Patterson DO  Attending Emergency Medicine Physician        Audrey Patterson DO  01/23/20 9698

## 2020-01-23 NOTE — ED PROVIDER NOTES
[id number] [facility] ED  [address] [facility] [address]  Phone: [phone]  Fax: [address]      Pt Name: Maurilio More  MRN: [id number]  [place] 1975  Date of evaluation: 1/22/20      CHIEF COMPLAINT:   Chief Complaint   Patient presents with    Headache     since friday, pt has hx of migraines and has taken max amount of med    Emesis    Extremity Weakness     left arm since friday     HISTORY OF PRESENT ILLNESS    Maurilio More is a 45 y.o. female who presents with a CEPHALGIA complaint:     \"Worst headache of your life? \"     NO  Typical in location/character to previous headaches? YES- head, NO- arm  Gradually worsening? YES   Nausea? YES    Vomiting? NO  Photophobia? YES  Vision changes? NO  Significant URI/sinusitis? NO  Neck pain? YES   Fevers? NO  Timeframe/context? Pt here with HA since Friday, has maxed out her home meds. Called PCP and Neurologist but couldn't get seen. Some left neck pain and left finger/hand paresthesias which is new for her. Hx of chronic HA. No injury/trauma/f/c/n/v/chest/resp/other associated symptoms. She has pain stimulator and thinks this might be due to this with the neck/finger symptoms. No neuro deficits reported by patient or . Modifying factors?  movement         Nursing Notes were reviewed. REVIEW OF SYSTEMS     Constitutional: per HPI  Eyes: per HPI   Neck: No neck pain. Respiratory: Denies recent shortness of breath. Cardiac:  Denies recent chest pain. GI:  Per HPI  : Denies dysuria. Musculoskeletal: per HPI  Neurologic:  Per HPI  Skin:  Denies any rash. Negative in 10 essential Systems except as mentioned above and in the HPI.       PAST MEDICAL HISTORY   PMH:  has a past medical history of Anxiety, Bladder disorder, CMT (Charcot-Melissa-Tooth disease), Depression, GERD (gastroesophageal reflux disease), Hypertension, Migraines, Neuropathy, Neuropathy involving both lower extremities, and Psychiatric problem. Surgical History:  has a past surgical history that includes Bladder surgery; Neck surgery (08/2012); Hysterectomy; Cholecystectomy; Knee arthroscopy (Bilateral); back surgery; Colonoscopy; Endoscopy, colon, diagnostic; Dilatation, esophagus; Cervical spine surgery; and Spine surgery. Social History:  reports that she has never smoked. She has never used smokeless tobacco. She reports that she does not drink alcohol or use drugs. Family History: Noncontributory   Psychiatric History: Noncontributory     Allergies:is allergic to latex; azithromycin; lamictal [lamotrigine]; pcn [penicillins]; compazine [prochlorperazine maleate]; lexapro [escitalopram oxalate]; buspar [buspirone]; and zofran [ondansetron hcl]. PHYSICAL EXAM     INITIAL VITALS: BP (!) 138/108   Pulse 88   Temp 98.4 °F (36.9 °C) (Oral)   Resp 14   Ht 5' 2\" (1.575 m)   Wt 88 kg (194 lb)   SpO2 97%   BMI 35.48 kg/m²     Constitutional:  Well developed, A&O x 3  Eyes:  Pupils equal and readily reactive to light at 3 mm bilaterally  HENT:  Atraumatic, external ears normal, nose normal, oropharynx moist.  No tenderness to palpation of the temporal arteries. Neck:  No midline pain. Full range of motion. Supple with no meningismus. Subjective left paraspinal pain as well as upper cervical generalized pain. Moving neck freely and w/o much caution. Respiratory:  Clear to auscultation bilaterally with good air exchange, no W/R/R  Cardiovascular:  RRR with normal S1 and S2  Musculoskeletal:  No edema, no tenderness, no deformities, full ROM x 4 and NV intact distally. Full ROM of LUE. Reported paresthesias of fingers/hand but remainder of LUE wnl.  2+ distal radius pulse. Remainder of MS exam wnl. Back:  No midline tenderness or pain with range of motion. No CVA tenderness. Normal to inspection. Integument:  No rash.     Neuro examination:    Answering questions appropriately. No gaze deficit. EMERGENCY DEPARTMENT COURSE:     Orders Placed This Encounter   Medications    ketorolac (TORADOL) injection 15 mg    orphenadrine (NORFLEX) injection 60 mg    metoclopramide (REGLAN) injection 10 mg    dexamethasone (DECADRON) injection 10 mg    0.9 % sodium chloride bolus    DISCONTD: nalbuphine (NUBAIN) injection 10 mg    haloperidol lactate (HALDOL) injection 5 mg    DISCONTD: orphenadrine (NORFLEX) injection 60 mg    diphenhydrAMINE (BENADRYL) injection 25 mg    promethazine (PHENERGAN) 25 MG suppository     Sig: Place 1 suppository rectally every 6 hours as needed for Nausea WARNING:  May cause drowsiness. May impair ability to operate vehicles or machinery. Do not use in combination with alcohol. Dispense:  20 suppository     Refill:  0     2051  Hx of chronic HA. She reports she thinks this might be front her stimulator implant with her left neck pain/left finger paresthesias. No midline neck pain or any other acute clinical findings. Cervical XR and non-narcotic cocktail with Norflex. 2125  Attending to complete all remaining care, diagnosis and disposition for this patient. We discussed this patient prior to my departure. My note will be refreshed to reflect these details, though I was not actively involved in this patient's care following the time stamp above.      DIAGNOSTIC RESULTS     EKG: All EKG's are interpreted by the Emergency Department Physician who either signs or Co-signs this chart in the absence of a cardiologist.  Not indicated    RADIOLOGY:   Non-plain film images such as CT, Ultrasound and MRI are read by the radiologist. Plain radiographic images are visualized and preliminarily interpreted by the emergency physician with the below findings:    Not indicated    Interpretation per the Radiologist below, if available at the time of this note:    XR CERVICAL SPINE (2-3 VIEWS)   Final Result   No acute osseous abnormality of the

## 2021-12-23 ENCOUNTER — APPOINTMENT (OUTPATIENT)
Dept: CT IMAGING | Age: 46
End: 2021-12-23
Payer: COMMERCIAL

## 2021-12-23 ENCOUNTER — APPOINTMENT (OUTPATIENT)
Dept: GENERAL RADIOLOGY | Age: 46
End: 2021-12-23
Payer: COMMERCIAL

## 2021-12-23 ENCOUNTER — HOSPITAL ENCOUNTER (OUTPATIENT)
Age: 46
Setting detail: OBSERVATION
Discharge: HOME | End: 2021-12-24
Attending: EMERGENCY MEDICINE
Payer: COMMERCIAL

## 2021-12-23 DIAGNOSIS — R77.8 ELEVATED TROPONIN: ICD-10-CM

## 2021-12-23 DIAGNOSIS — R07.9 CHEST PAIN, UNSPECIFIED TYPE: Primary | ICD-10-CM

## 2021-12-23 LAB
ABSOLUTE EOS #: 0.1 K/UL (ref 0–0.4)
ABSOLUTE IMMATURE GRANULOCYTE: ABNORMAL K/UL (ref 0–0.3)
ABSOLUTE LYMPH #: 0.7 K/UL (ref 1–4.8)
ABSOLUTE MONO #: 0.4 K/UL (ref 0.1–1.2)
ALBUMIN SERPL-MCNC: 4.9 G/DL (ref 3.5–5.2)
ALBUMIN/GLOBULIN RATIO: 1.6 (ref 1–2.5)
ALP BLD-CCNC: 77 U/L (ref 35–104)
ALT SERPL-CCNC: 29 U/L (ref 5–33)
ANION GAP SERPL CALCULATED.3IONS-SCNC: 11 MMOL/L (ref 9–17)
AST SERPL-CCNC: 24 U/L
BASOPHILS # BLD: 0 % (ref 0–2)
BASOPHILS ABSOLUTE: 0 K/UL (ref 0–0.2)
BILIRUB SERPL-MCNC: 0.35 MG/DL (ref 0.3–1.2)
BILIRUBIN DIRECT: 0.1 MG/DL
BILIRUBIN, INDIRECT: 0.25 MG/DL (ref 0–1)
BUN BLDV-MCNC: 16 MG/DL (ref 6–20)
BUN/CREAT BLD: ABNORMAL (ref 9–20)
CALCIUM SERPL-MCNC: 9.1 MG/DL (ref 8.6–10.4)
CHLORIDE BLD-SCNC: 109 MMOL/L (ref 98–107)
CO2: 20 MMOL/L (ref 20–31)
CREAT SERPL-MCNC: 0.65 MG/DL (ref 0.5–0.9)
DIFFERENTIAL TYPE: ABNORMAL
EOSINOPHILS RELATIVE PERCENT: 1 % (ref 1–4)
GFR AFRICAN AMERICAN: >60 ML/MIN
GFR NON-AFRICAN AMERICAN: >60 ML/MIN
GFR SERPL CREATININE-BSD FRML MDRD: ABNORMAL ML/MIN/{1.73_M2}
GFR SERPL CREATININE-BSD FRML MDRD: ABNORMAL ML/MIN/{1.73_M2}
GLOBULIN: NORMAL G/DL (ref 1.5–3.8)
GLUCOSE BLD-MCNC: 96 MG/DL (ref 70–99)
HCT VFR BLD CALC: 44.6 % (ref 36–46)
HEMOGLOBIN: 14.7 G/DL (ref 12–16)
IMMATURE GRANULOCYTES: ABNORMAL %
LIPASE: 38 U/L (ref 13–60)
LYMPHOCYTES # BLD: 9 % (ref 24–44)
MCH RBC QN AUTO: 30.5 PG (ref 26–34)
MCHC RBC AUTO-ENTMCNC: 33 G/DL (ref 31–37)
MCV RBC AUTO: 92.3 FL (ref 80–100)
MONOCYTES # BLD: 5 % (ref 2–11)
NRBC AUTOMATED: ABNORMAL PER 100 WBC
PDW BLD-RTO: 13.8 % (ref 12.5–15.4)
PLATELET # BLD: 243 K/UL (ref 140–450)
PLATELET ESTIMATE: ABNORMAL
PMV BLD AUTO: 7.7 FL (ref 6–12)
POTASSIUM SERPL-SCNC: 4.1 MMOL/L (ref 3.7–5.3)
RBC # BLD: 4.83 M/UL (ref 4–5.2)
RBC # BLD: ABNORMAL 10*6/UL
SARS-COV-2, RAPID: NOT DETECTED
SEG NEUTROPHILS: 85 % (ref 36–66)
SEGMENTED NEUTROPHILS ABSOLUTE COUNT: 7.1 K/UL (ref 1.8–7.7)
SODIUM BLD-SCNC: 140 MMOL/L (ref 135–144)
SPECIMEN DESCRIPTION: NORMAL
TOTAL PROTEIN: 7.9 G/DL (ref 6.4–8.3)
TROPONIN INTERP: ABNORMAL
TROPONIN T: ABNORMAL NG/ML
TROPONIN, HIGH SENSITIVITY: 24 NG/L (ref 0–14)
TROPONIN, HIGH SENSITIVITY: 25 NG/L (ref 0–14)
TROPONIN, HIGH SENSITIVITY: 26 NG/L (ref 0–14)
TROPONIN, HIGH SENSITIVITY: 28 NG/L (ref 0–14)
WBC # BLD: 8.3 K/UL (ref 3.5–11)
WBC # BLD: ABNORMAL 10*3/UL

## 2021-12-23 PROCEDURE — 93005 ELECTROCARDIOGRAM TRACING: CPT | Performed by: EMERGENCY MEDICINE

## 2021-12-23 PROCEDURE — G0378 HOSPITAL OBSERVATION PER HR: HCPCS

## 2021-12-23 PROCEDURE — 96375 TX/PRO/DX INJ NEW DRUG ADDON: CPT

## 2021-12-23 PROCEDURE — 96374 THER/PROPH/DIAG INJ IV PUSH: CPT

## 2021-12-23 PROCEDURE — 6360000004 HC RX CONTRAST MEDICATION: Performed by: EMERGENCY MEDICINE

## 2021-12-23 PROCEDURE — 85025 COMPLETE CBC W/AUTO DIFF WBC: CPT

## 2021-12-23 PROCEDURE — 36415 COLL VENOUS BLD VENIPUNCTURE: CPT

## 2021-12-23 PROCEDURE — 83690 ASSAY OF LIPASE: CPT

## 2021-12-23 PROCEDURE — 84484 ASSAY OF TROPONIN QUANT: CPT

## 2021-12-23 PROCEDURE — 2580000003 HC RX 258: Performed by: EMERGENCY MEDICINE

## 2021-12-23 PROCEDURE — 6370000000 HC RX 637 (ALT 250 FOR IP): Performed by: NURSE PRACTITIONER

## 2021-12-23 PROCEDURE — 87635 SARS-COV-2 COVID-19 AMP PRB: CPT

## 2021-12-23 PROCEDURE — 74174 CTA ABD&PLVS W/CONTRAST: CPT

## 2021-12-23 PROCEDURE — 80048 BASIC METABOLIC PNL TOTAL CA: CPT

## 2021-12-23 PROCEDURE — 2580000003 HC RX 258: Performed by: NURSE PRACTITIONER

## 2021-12-23 PROCEDURE — 6360000002 HC RX W HCPCS: Performed by: EMERGENCY MEDICINE

## 2021-12-23 PROCEDURE — 99285 EMERGENCY DEPT VISIT HI MDM: CPT

## 2021-12-23 PROCEDURE — 80076 HEPATIC FUNCTION PANEL: CPT

## 2021-12-23 PROCEDURE — 96376 TX/PRO/DX INJ SAME DRUG ADON: CPT

## 2021-12-23 RX ORDER — ATORVASTATIN CALCIUM 10 MG/1
10 TABLET, FILM COATED ORAL DAILY
COMMUNITY
Start: 2021-06-25

## 2021-12-23 RX ORDER — GALCANEZUMAB 120 MG/ML
INJECTION, SOLUTION SUBCUTANEOUS
COMMUNITY

## 2021-12-23 RX ORDER — ATORVASTATIN CALCIUM 10 MG/1
10 TABLET, FILM COATED ORAL DAILY
Status: DISCONTINUED | OUTPATIENT
Start: 2021-12-24 | End: 2021-12-24 | Stop reason: HOSPADM

## 2021-12-23 RX ORDER — POTASSIUM CHLORIDE 7.45 MG/ML
10 INJECTION INTRAVENOUS PRN
Status: DISCONTINUED | OUTPATIENT
Start: 2021-12-23 | End: 2021-12-24 | Stop reason: HOSPADM

## 2021-12-23 RX ORDER — SODIUM CHLORIDE 9 MG/ML
25 INJECTION, SOLUTION INTRAVENOUS PRN
Status: DISCONTINUED | OUTPATIENT
Start: 2021-12-23 | End: 2021-12-24 | Stop reason: HOSPADM

## 2021-12-23 RX ORDER — NITROGLYCERIN 0.4 MG/1
0.4 TABLET SUBLINGUAL EVERY 5 MIN PRN
Status: DISCONTINUED | OUTPATIENT
Start: 2021-12-23 | End: 2021-12-24 | Stop reason: HOSPADM

## 2021-12-23 RX ORDER — SODIUM CHLORIDE 0.9 % (FLUSH) 0.9 %
5-40 SYRINGE (ML) INJECTION EVERY 12 HOURS SCHEDULED
Status: DISCONTINUED | OUTPATIENT
Start: 2021-12-23 | End: 2021-12-24 | Stop reason: HOSPADM

## 2021-12-23 RX ORDER — ALPRAZOLAM 0.5 MG/1
0.5 TABLET ORAL 3 TIMES DAILY PRN
COMMUNITY

## 2021-12-23 RX ORDER — TOPIRAMATE 100 MG/1
200 TABLET, FILM COATED ORAL 2 TIMES DAILY
Status: DISCONTINUED | OUTPATIENT
Start: 2021-12-23 | End: 2021-12-24 | Stop reason: HOSPADM

## 2021-12-23 RX ORDER — ACETAMINOPHEN 650 MG/1
650 SUPPOSITORY RECTAL EVERY 6 HOURS PRN
Status: DISCONTINUED | OUTPATIENT
Start: 2021-12-23 | End: 2021-12-24 | Stop reason: HOSPADM

## 2021-12-23 RX ORDER — SERTRALINE HYDROCHLORIDE 25 MG/1
25 TABLET, FILM COATED ORAL DAILY
Status: DISCONTINUED | OUTPATIENT
Start: 2021-12-24 | End: 2021-12-24 | Stop reason: HOSPADM

## 2021-12-23 RX ORDER — SERTRALINE HYDROCHLORIDE 25 MG/1
25 TABLET, FILM COATED ORAL DAILY
COMMUNITY

## 2021-12-23 RX ORDER — SODIUM CHLORIDE 9 MG/ML
INJECTION, SOLUTION INTRAVENOUS CONTINUOUS
Status: DISCONTINUED | OUTPATIENT
Start: 2021-12-23 | End: 2021-12-24

## 2021-12-23 RX ORDER — ALPRAZOLAM 0.5 MG/1
0.5 TABLET ORAL 3 TIMES DAILY PRN
Status: DISCONTINUED | OUTPATIENT
Start: 2021-12-23 | End: 2021-12-24 | Stop reason: HOSPADM

## 2021-12-23 RX ORDER — ACETAMINOPHEN 325 MG/1
650 TABLET ORAL EVERY 6 HOURS PRN
Status: DISCONTINUED | OUTPATIENT
Start: 2021-12-23 | End: 2021-12-24 | Stop reason: HOSPADM

## 2021-12-23 RX ORDER — MORPHINE SULFATE 2 MG/ML
2 INJECTION, SOLUTION INTRAMUSCULAR; INTRAVENOUS ONCE
Status: COMPLETED | OUTPATIENT
Start: 2021-12-23 | End: 2021-12-23

## 2021-12-23 RX ORDER — POTASSIUM CHLORIDE 20 MEQ/1
40 TABLET, EXTENDED RELEASE ORAL PRN
Status: DISCONTINUED | OUTPATIENT
Start: 2021-12-23 | End: 2021-12-24 | Stop reason: HOSPADM

## 2021-12-23 RX ORDER — 0.9 % SODIUM CHLORIDE 0.9 %
80 INTRAVENOUS SOLUTION INTRAVENOUS ONCE
Status: COMPLETED | OUTPATIENT
Start: 2021-12-23 | End: 2021-12-23

## 2021-12-23 RX ORDER — 0.9 % SODIUM CHLORIDE 0.9 %
1000 INTRAVENOUS SOLUTION INTRAVENOUS ONCE
Status: COMPLETED | OUTPATIENT
Start: 2021-12-23 | End: 2021-12-23

## 2021-12-23 RX ORDER — SODIUM CHLORIDE 0.9 % (FLUSH) 0.9 %
10 SYRINGE (ML) INJECTION PRN
Status: DISCONTINUED | OUTPATIENT
Start: 2021-12-23 | End: 2021-12-24 | Stop reason: HOSPADM

## 2021-12-23 RX ORDER — MAGNESIUM SULFATE 1 G/100ML
1000 INJECTION INTRAVENOUS PRN
Status: DISCONTINUED | OUTPATIENT
Start: 2021-12-23 | End: 2021-12-24 | Stop reason: HOSPADM

## 2021-12-23 RX ADMIN — SODIUM CHLORIDE: 9 INJECTION, SOLUTION INTRAVENOUS at 22:45

## 2021-12-23 RX ADMIN — IOPAMIDOL 100 ML: 755 INJECTION, SOLUTION INTRAVENOUS at 14:06

## 2021-12-23 RX ADMIN — ACETAMINOPHEN 650 MG: 325 TABLET ORAL at 21:26

## 2021-12-23 RX ADMIN — SODIUM CHLORIDE 1000 ML: 9 INJECTION, SOLUTION INTRAVENOUS at 16:11

## 2021-12-23 RX ADMIN — MORPHINE SULFATE 2 MG: 2 INJECTION, SOLUTION INTRAMUSCULAR; INTRAVENOUS at 16:11

## 2021-12-23 RX ADMIN — SODIUM CHLORIDE 1000 ML: 9 INJECTION, SOLUTION INTRAVENOUS at 13:41

## 2021-12-23 RX ADMIN — TOPIRAMATE 200 MG: 100 TABLET, FILM COATED ORAL at 22:41

## 2021-12-23 RX ADMIN — ASPIRIN 325 MG: 325 TABLET, COATED ORAL at 22:41

## 2021-12-23 RX ADMIN — HYDROMORPHONE HYDROCHLORIDE 1 MG: 1 INJECTION, SOLUTION INTRAMUSCULAR; INTRAVENOUS; SUBCUTANEOUS at 18:22

## 2021-12-23 RX ADMIN — SODIUM CHLORIDE, PRESERVATIVE FREE 10 ML: 5 INJECTION INTRAVENOUS at 14:06

## 2021-12-23 RX ADMIN — ALPRAZOLAM 0.5 MG: 0.5 TABLET ORAL at 22:41

## 2021-12-23 RX ADMIN — MORPHINE SULFATE 2 MG: 2 INJECTION, SOLUTION INTRAMUSCULAR; INTRAVENOUS at 15:20

## 2021-12-23 RX ADMIN — SODIUM CHLORIDE, PRESERVATIVE FREE 10 ML: 5 INJECTION INTRAVENOUS at 22:41

## 2021-12-23 RX ADMIN — SODIUM CHLORIDE 80 ML: 9 INJECTION, SOLUTION INTRAVENOUS at 14:05

## 2021-12-23 ASSESSMENT — ENCOUNTER SYMPTOMS: ABDOMINAL PAIN: 1

## 2021-12-23 ASSESSMENT — PAIN DESCRIPTION - LOCATION
LOCATION: HEAD
LOCATION_2: CHEST
LOCATION: CHEST

## 2021-12-23 ASSESSMENT — PAIN SCALES - GENERAL
PAINLEVEL_OUTOF10: 8
PAINLEVEL_OUTOF10: 5
PAINLEVEL_OUTOF10: 9
PAINLEVEL_OUTOF10: 8
PAINLEVEL_OUTOF10: 3
PAINLEVEL_OUTOF10: 9
PAINLEVEL_OUTOF10: 9

## 2021-12-23 ASSESSMENT — PAIN DESCRIPTION - INTENSITY: RATING_2: 9

## 2021-12-23 ASSESSMENT — PAIN DESCRIPTION - PAIN TYPE
TYPE: ACUTE PAIN

## 2021-12-23 NOTE — ED PROVIDER NOTES
[address] [facility]  [address] [facility] [address] [facility] [address]  Phone: [phone]  Fax: [phone]        Pt Name: George Diamond  MRN: [id number]  [place] 1975  Date of evaluation: 12/23/21      CHIEF COMPLAINT     Chief Complaint   Patient presents with    Chest Pain    Headache    Nausea         HISTORY OF PRESENT ILLNESS  (Location/Symptom, Timing/Onset, Context/Setting, Quality, Duration, Modifying Factors, Severity.)    George Diamond is a 42 y.o. female who presents with chest pain. The patient states that she has had chest pain for the last couple of days she also has some epigastric abdominal pain some nausea headache denies any fever or chills no blood in the urine or stool no trauma nothing she does makes her symptoms better or worse      REVIEW OF SYSTEMS    (2-9 systems for level 4, 10 or more for level 5)     Review of Systems   Cardiovascular: Positive for chest pain. Gastrointestinal: Positive for abdominal pain. Neurological: Positive for headaches. All other systems reviewed and are negative. PAST MEDICAL HISTORY    has a past medical history of Anxiety, Bladder disorder, CMT (Charcot-Melissa-Tooth disease), Depression, GERD (gastroesophageal reflux disease), Hypertension, Migraines, Neuropathy, Neuropathy involving both lower extremities, and Psychiatric problem. SURGICAL HISTORY      has a past surgical history that includes Bladder surgery; Neck surgery (08/2012); Hysterectomy; Cholecystectomy; Knee arthroscopy (Bilateral); back surgery; Colonoscopy; Endoscopy, colon, diagnostic; Dilatation, esophagus; Cervical spine surgery; and Spine surgery. CURRENTMEDICATIONS       Previous Medications    ALPRAZOLAM (XANAX) 0.5 MG TABLET    Take 0.5 mg by mouth 3 times daily as needed.     ATORVASTATIN (LIPITOR) 10 MG TABLET    Take 10 mg by mouth daily    GALCANEZUMAB-GNLM (EMGALITY) 120 MG/ML SOAJ    every 30 days Emgality Pen 120 mg/mL subcutaneous pen injector  inject 1 milliliter ( 120 milligrams ) subcutaneously and INTO TH. ..  (REFER TO PRESCRIPTION    PROMETHAZINE (PHENERGAN) 12.5 MG TABLET    Take 25 mg by mouth     RIZATRIPTAN (MAXALT) 10 MG TABLET        SERTRALINE (ZOLOFT) 25 MG TABLET    Take 25 mg by mouth daily    TIZANIDINE (ZANAFLEX) 4 MG TABLET    Take 2 mg by mouth every 6 hours as needed (2 mg in AM, 2mg at noon and 4mg at bedtime)     TOPIRAMATE (TOPAMAX) 25 MG TABLET    Take 200 mg by mouth 2 times daily        ALLERGIES     is allergic to latex, azithromycin, lamictal [lamotrigine], pcn [penicillins], compazine [prochlorperazine maleate], lexapro [escitalopram oxalate], buspar [buspirone], and zofran [ondansetron hcl]. FAMILY HISTORY     She indicated that her mother is alive. She indicated that her father is . She indicated that her brother is alive. She indicated that the status of her maternal grandmother is unknown and reported the following: has CMT syndrome. She indicated that the status of her other is unknown.     family history includes Cancer in her father; Coronary Art Dis in her father; Mental Illness in her father; Parkinsonism in an other family member; Stroke in her father. SOCIAL HISTORY      reports that she has never smoked. She has never used smokeless tobacco. She reports that she does not drink alcohol and does not use drugs. PHYSICAL EXAM    (up to 7 for level 4, 8 or more for level 5)   INITIAL VITALS:  height is 5' 2\" (1.575 m) and weight is 60.8 kg (134 lb). Her oral temperature is 97.8 °F (36.6 °C). Her blood pressure is 117/81 and her pulse is 76. Her respiration is 16 and oxygen saturation is 99%. Physical Exam  Vitals and nursing note reviewed. Constitutional:       Appearance: Normal appearance. HENT:      Head: Normocephalic and atraumatic. Eyes:      Conjunctiva/sclera: Conjunctivae normal.   Cardiovascular:      Rate and Rhythm: Normal rate and regular rhythm.       Pulses: Normal pulses. Heart sounds: Normal heart sounds. Pulmonary:      Effort: Pulmonary effort is normal. No respiratory distress. Breath sounds: Normal breath sounds. No stridor. No wheezing, rhonchi or rales. Abdominal:      General: Bowel sounds are normal. There is no distension. Palpations: Abdomen is soft. There is no mass. Tenderness: There is abdominal tenderness. There is no right CVA tenderness, left CVA tenderness, guarding or rebound. Comments: Tenderness to palpation the epigastric region   Musculoskeletal:         General: No swelling or tenderness. Normal range of motion. Cervical back: Normal range of motion and neck supple. Skin:     General: Skin is warm and dry. Findings: No rash. Neurological:      General: No focal deficit present. Mental Status: She is alert. DIFFERENTIAL DIAGNOSIS/ MDM:     IV will be established I will get labs EKG and imaging    DIAGNOSTIC RESULTS     EKG: All EKG's are interpreted by the Emergency Department Physician who either signs or Co-signs this chart in the absence of a cardiologist.      Interpreted by Gareth Valdovinos MD     Rhythm: normal sinus   Rate: 83  Axis: 63  Ectopy: none  Conduction: normal  ST Segments: no acute change  T Waves: no acute change  Q Waves: none    Clinical Impression: normal sinus rhythm with no acute changes/normal EKG. No acute infarction/ischemia noted. Interpreted by Gareth Valdovinos MD     Rhythm: normal sinus   Rate: 79  Axis: 54  Ectopy: none  Conduction: normal  ST Segments: no acute change  T Waves: no acute change  Q Waves: none    Clinical Impression: normal sinus rhythm with no acute changes/normal EKG. No acute infarction/ischemia noted.       Interpreted by Gareth Valdovinos MD     Rhythm: normal sinus   Rate: 74  Axis: 44  Ectopy: none  Conduction: normal  ST Segments: no acute change  T Waves: no acute change  Q Waves: none    Clinical Impression: normal sinus rhythm with no acute changes/normal EKG. No acute infarction/ischemia noted. RADIOLOGY:        Interpretation per the Radiologist below, if available at the time of this note:    CTA CHEST [address] (Final result)  Result time 12/23/21 14:32:40  Procedure changed from CT Chest Dissect Abdomen Pelvis W Contrast  Final result by Karol Jerez MD (12/23/21 14:32:40)                Impression:    1. No acute vascular injury. 2. No evidence for aneurysm or dissection. 3. No direct evidence for acute infective or inflammatory process. 4. Liquid stool in the descending colon down to the rectum suggestive of   diarrheal disease. RECOMMENDATIONS:   Unavailable             Narrative:    EXAMINATION:   CTA OF THE CHEST, ABDOMEN AND PELVIS WITH CONTRAST     12/23/2021 2:04 pm:     TECHNIQUE:   CTA of the chest, abdomen and pelvis was performed after the administration   of intravenous contrast.  Multiplanar reformatted images are provided for   review.  MIP images are provided for review. Dose modulation, iterative   reconstruction, and/or weight based adjustment of the mA/kV was utilized to   reduce the radiation dose to as low as reasonably achievable. COMPARISON:   None. HISTORY:   ORDERING SYSTEM PROVIDED HISTORY: chest pain / abdominal pain   TECHNOLOGIST PROVIDED HISTORY:   chest pain / abdominal pain   Decision Support Exception - unselect if not a suspected or confirmed   emergency medical condition->Emergency Medical Condition (MA)   Reason for Exam: chest and back pain   Additional signs and symptoms: nausea     FINDINGS:     CTA CHEST:     Thoracic aorta: No evidence of thoracic aortic aneurysm or dissection.  No   acute abnormality of the aorta. Mediastinum:  Normal cardiac size.    The main pulmonary arteries are grossly   normal.  No significant mediastinal, hilar or axillary lymphadenopathy. Thyroid gland shows no significant abnormalities.  Esophagus shows no significant abnormalities. Lungs/Pleura: There are no significant nodules or masses.  No focal   consolidation.   There is no pleural effusion or pneumothorax.  Normal   tracheobronchial tree. Soft Tissues/Bones: No acute bone or soft tissue abnormality. CTA ABDOMEN:     Abdominal aorta/Branches: Normal size aorta show no aneurysm or dissection. Celiac trunk, SMA, CODY, renal arteries  arteries enhance normally. Organs: The liver, spleen, pancreas, kidneys and adrenal glands show no   significant abnormality.  Cholecystectomy noted. GI/Bowel: There is limited evaluation due to absence of oral contrast.   Stomach grossly normal.  Small bowel shows no obstruction or focal lesions. Liquid stool in the descending colon down to the rectum suggestive of   diarrheal disease. Peritoneum/Retroperitoneum: No free fluid or lymphadenopathy. Bones/Soft Tissues: Right gluteal electrode with lead extending into the   right presacral region.  No acute bony abnormality. CTA PELVIS:     Aorta/Iliacs: No aneurysm or dissection. Other: No fluid collections or suspicious mass. Bones/Soft Tissues: No acute abnormality of the bones.  The superficial soft   tissues show no significant abnormalities.                    LABS:  Results for orders placed or performed during the hospital encounter of 71/33/52   Basic Metabolic Panel   Result Value Ref Range    Glucose 96 70 - 99 mg/dL    BUN 16 6 - 20 mg/dL    CREATININE 0.65 0.50 - 0.90 mg/dL    Bun/Cre Ratio NOT REPORTED 9 - 20    Calcium 9.1 8.6 - 10.4 mg/dL    Sodium 140 135 - 144 mmol/L    Potassium 4.1 3.7 - 5.3 mmol/L    Chloride 109 (H) 98 - 107 mmol/L    CO2 20 20 - 31 mmol/L    Anion Gap 11 9 - 17 mmol/L    GFR Non-African American >60 >60 mL/min    GFR African American >60 >60 mL/min    GFR Comment          GFR Staging NOT REPORTED    CBC Auto Differential   Result Value Ref Range    WBC 8.3 3.5 - 11.0 k/uL    RBC 4.83 4.0 - 5.2 m/uL Hemoglobin 14.7 12.0 - 16.0 g/dL    Hematocrit 44.6 36 - 46 %    MCV 92.3 80 - 100 fL    MCH 30.5 26 - 34 pg    MCHC 33.0 31 - 37 g/dL    RDW 13.8 12.5 - 15.4 %    Platelets 654 376 - 127 k/uL    MPV 7.7 6.0 - 12.0 fL    NRBC Automated NOT REPORTED per 100 WBC    Differential Type NOT REPORTED     Seg Neutrophils 85 (H) 36 - 66 %    Lymphocytes 9 (L) 24 - 44 %    Monocytes 5 2 - 11 %    Eosinophils % 1 1 - 4 %    Basophils 0 0 - 2 %    Immature Granulocytes NOT REPORTED 0 %    Segs Absolute 7.10 1.8 - 7.7 k/uL    Absolute Lymph # 0.70 (L) 1.0 - 4.8 k/uL    Absolute Mono # 0.40 0.1 - 1.2 k/uL    Absolute Eos # 0.10 0.0 - 0.4 k/uL    Basophils Absolute 0.00 0.0 - 0.2 k/uL    Absolute Immature Granulocyte NOT REPORTED 0.00 - 0.30 k/uL    WBC Morphology NOT REPORTED     RBC Morphology NOT REPORTED     Platelet Estimate NOT REPORTED    Hepatic Function Panel   Result Value Ref Range    Albumin 4.9 3.5 - 5.2 g/dL    Alkaline Phosphatase 77 35 - 104 U/L    ALT 29 5 - 33 U/L    AST 24 <32 U/L    Total Bilirubin 0.35 0.3 - 1.2 mg/dL    Bilirubin, Direct 0.10 <0.31 mg/dL    Bilirubin, Indirect 0.25 0.00 - 1.00 mg/dL    Total Protein 7.9 6.4 - 8.3 g/dL    Globulin NOT REPORTED 1.5 - 3.8 g/dL    Albumin/Globulin Ratio 1.6 1.0 - 2.5   Lipase   Result Value Ref Range    Lipase 38 13 - 60 U/L   Troponin   Result Value Ref Range    Troponin, High Sensitivity 25 (H) 0 - 14 ng/L    Troponin T NOT REPORTED <0.03 ng/mL    Troponin Interp NOT REPORTED    Troponin   Result Value Ref Range    Troponin, High Sensitivity 26 (H) 0 - 14 ng/L    Troponin T NOT REPORTED <0.03 ng/mL    Troponin Interp NOT REPORTED    Troponin   Result Value Ref Range    Troponin, High Sensitivity 28 (H) 0 - 14 ng/L    Troponin T NOT REPORTED <0.03 ng/mL    Troponin Interp NOT REPORTED    EKG 12 Lead   Result Value Ref Range    Ventricular Rate 83 BPM    Atrial Rate 83 BPM    P-R Interval 130 ms    QRS Duration 92 ms    Q-T Interval 356 ms    QTc Calculation (Arielle) 418 ms    P Axis 67 degrees    R Axis 63 degrees    T Axis 46 degrees           EMERGENCY DEPARTMENT COURSE:   Vitals:    Vitals:    12/23/21 1257 12/23/21 1612   BP: 125/87 117/81   Pulse: 81 76   Resp: 16 16   Temp: 97.8 °F (36.6 °C)    TempSrc: Oral    SpO2: 100% 99%   Weight: 60.8 kg (134 lb)    Height: 5' 2\" (1.575 m)      -------------------------  BP: 117/81, Temp: 97.8 °F (36.6 °C), Pulse: 76, Resp: 16      RE-EVALUATION:  The patient's initial troponin is slightly elevated I did give the patient some morphine I offered her aspirin which she refuses states she cannot take aspirin states that she is allergic to it although it is not listed as an allergy I did get a delta set of cardiac enzymes and again her high-sensitivity troponin is slightly elevated at 26 her EKG showed no acute changes she states she has had this pain constant for the last couple of days I did offer her admission to the hospital which the patient states she would prefer to simply try 1 more set of cardiac enzymes so we will give her some additional morphine we will go ahead and hold her for a third set of cardiac enzymes    CONSULTS:  Dr. Perry Cotton will take over care of the patient per shift change    PROCEDURES:  None    FINAL IMPRESSION      1. Chest pain, unspecified type    2. Elevated troponin          DISPOSITION/PLAN   DISPOSITION        CONDITION ON DISPOSITION:   Stable    PATIENT REFERRED TO:  No follow-up provider specified. DISCHARGE MEDICATIONS:  New Prescriptions    No medications on file       (Please note that portions of this note were completed with a voicerecognition program.  Efforts were made to edit the dictations but occasionally words are mis-transcribed.)    Booker Morin MD,, MD, F.A.C.E.P.   Attending Emergency Medicine Physician       Booker Morin MD  12/23/21 4225

## 2021-12-24 ENCOUNTER — APPOINTMENT (OUTPATIENT)
Dept: NUCLEAR MEDICINE | Age: 46
End: 2021-12-24
Payer: COMMERCIAL

## 2021-12-24 VITALS
OXYGEN SATURATION: 99 % | HEART RATE: 73 BPM | BODY MASS INDEX: 25.48 KG/M2 | RESPIRATION RATE: 18 BRPM | TEMPERATURE: 97.5 F | WEIGHT: 138.45 LBS | SYSTOLIC BLOOD PRESSURE: 113 MMHG | HEIGHT: 62 IN | DIASTOLIC BLOOD PRESSURE: 78 MMHG

## 2021-12-24 PROBLEM — R07.89 MUSCULOSKELETAL CHEST PAIN: Status: ACTIVE | Noted: 2021-12-23

## 2021-12-24 PROBLEM — Z87.820 HISTORY OF CLOSED HEAD INJURY: Status: ACTIVE | Noted: 2021-12-24

## 2021-12-24 LAB
ALBUMIN SERPL-MCNC: 3.6 G/DL (ref 3.5–5.2)
ALBUMIN/GLOBULIN RATIO: 1.9 (ref 1–2.5)
ALP BLD-CCNC: 52 U/L (ref 35–104)
ALT SERPL-CCNC: 21 U/L (ref 5–33)
ANION GAP SERPL CALCULATED.3IONS-SCNC: 9 MMOL/L (ref 9–17)
AST SERPL-CCNC: 16 U/L
BILIRUB SERPL-MCNC: 0.35 MG/DL (ref 0.3–1.2)
BUN BLDV-MCNC: 8 MG/DL (ref 6–20)
BUN/CREAT BLD: ABNORMAL (ref 9–20)
CALCIUM SERPL-MCNC: 7.9 MG/DL (ref 8.6–10.4)
CHLORIDE BLD-SCNC: 112 MMOL/L (ref 98–107)
CO2: 17 MMOL/L (ref 20–31)
CREAT SERPL-MCNC: 0.43 MG/DL (ref 0.5–0.9)
EKG ATRIAL RATE: 71 BPM
EKG ATRIAL RATE: 74 BPM
EKG ATRIAL RATE: 79 BPM
EKG ATRIAL RATE: 83 BPM
EKG P AXIS: 55 DEGREES
EKG P AXIS: 67 DEGREES
EKG P AXIS: 68 DEGREES
EKG P AXIS: 70 DEGREES
EKG P-R INTERVAL: 130 MS
EKG P-R INTERVAL: 136 MS
EKG P-R INTERVAL: 138 MS
EKG P-R INTERVAL: 144 MS
EKG Q-T INTERVAL: 356 MS
EKG Q-T INTERVAL: 374 MS
EKG Q-T INTERVAL: 374 MS
EKG Q-T INTERVAL: 388 MS
EKG QRS DURATION: 82 MS
EKG QRS DURATION: 86 MS
EKG QRS DURATION: 92 MS
EKG QRS DURATION: 92 MS
EKG QTC CALCULATION (BAZETT): 415 MS
EKG QTC CALCULATION (BAZETT): 418 MS
EKG QTC CALCULATION (BAZETT): 421 MS
EKG QTC CALCULATION (BAZETT): 428 MS
EKG R AXIS: 44 DEGREES
EKG R AXIS: 51 DEGREES
EKG R AXIS: 54 DEGREES
EKG R AXIS: 63 DEGREES
EKG T AXIS: 22 DEGREES
EKG T AXIS: 40 DEGREES
EKG T AXIS: 45 DEGREES
EKG T AXIS: 46 DEGREES
EKG VENTRICULAR RATE: 71 BPM
EKG VENTRICULAR RATE: 74 BPM
EKG VENTRICULAR RATE: 79 BPM
EKG VENTRICULAR RATE: 83 BPM
GFR AFRICAN AMERICAN: >60 ML/MIN
GFR NON-AFRICAN AMERICAN: >60 ML/MIN
GFR SERPL CREATININE-BSD FRML MDRD: ABNORMAL ML/MIN/{1.73_M2}
GFR SERPL CREATININE-BSD FRML MDRD: ABNORMAL ML/MIN/{1.73_M2}
GLUCOSE BLD-MCNC: 89 MG/DL (ref 70–99)
HCG QUALITATIVE: NEGATIVE
HCT VFR BLD CALC: 35.5 % (ref 36–46)
HEMOGLOBIN: 11.7 G/DL (ref 12–16)
LV EF: 67 %
LVEF MODALITY: NORMAL
MAGNESIUM: 1.8 MG/DL (ref 1.6–2.6)
MCH RBC QN AUTO: 30.6 PG (ref 26–34)
MCHC RBC AUTO-ENTMCNC: 33 G/DL (ref 31–37)
MCV RBC AUTO: 92.5 FL (ref 80–100)
NRBC AUTOMATED: ABNORMAL PER 100 WBC
PDW BLD-RTO: 14.1 % (ref 12.5–15.4)
PLATELET # BLD: 187 K/UL (ref 140–450)
PMV BLD AUTO: 7.7 FL (ref 6–12)
POTASSIUM SERPL-SCNC: 3.2 MMOL/L (ref 3.7–5.3)
RBC # BLD: 3.83 M/UL (ref 4–5.2)
SODIUM BLD-SCNC: 138 MMOL/L (ref 135–144)
TOTAL PROTEIN: 5.5 G/DL (ref 6.4–8.3)
TROPONIN INTERP: ABNORMAL
TROPONIN T: ABNORMAL NG/ML
TROPONIN, HIGH SENSITIVITY: 19 NG/L (ref 0–14)
WBC # BLD: 5.2 K/UL (ref 3.5–11)

## 2021-12-24 PROCEDURE — 80061 LIPID PANEL: CPT

## 2021-12-24 PROCEDURE — 36415 COLL VENOUS BLD VENIPUNCTURE: CPT

## 2021-12-24 PROCEDURE — 6360000002 HC RX W HCPCS: Performed by: NURSE PRACTITIONER

## 2021-12-24 PROCEDURE — 83735 ASSAY OF MAGNESIUM: CPT

## 2021-12-24 PROCEDURE — G0378 HOSPITAL OBSERVATION PER HR: HCPCS

## 2021-12-24 PROCEDURE — 2580000003 HC RX 258: Performed by: NURSE PRACTITIONER

## 2021-12-24 PROCEDURE — 84703 CHORIONIC GONADOTROPIN ASSAY: CPT

## 2021-12-24 PROCEDURE — 93005 ELECTROCARDIOGRAM TRACING: CPT | Performed by: NURSE PRACTITIONER

## 2021-12-24 PROCEDURE — A9500 TC99M SESTAMIBI: HCPCS | Performed by: INTERNAL MEDICINE

## 2021-12-24 PROCEDURE — 3430000000 HC RX DIAGNOSTIC RADIOPHARMACEUTICAL: Performed by: INTERNAL MEDICINE

## 2021-12-24 PROCEDURE — 6370000000 HC RX 637 (ALT 250 FOR IP): Performed by: NURSE PRACTITIONER

## 2021-12-24 PROCEDURE — 2580000003 HC RX 258: Performed by: INTERNAL MEDICINE

## 2021-12-24 PROCEDURE — 6360000002 HC RX W HCPCS: Performed by: INTERNAL MEDICINE

## 2021-12-24 PROCEDURE — 99219 PR INITIAL OBSERVATION CARE/DAY 50 MINUTES: CPT | Performed by: INTERNAL MEDICINE

## 2021-12-24 PROCEDURE — 85027 COMPLETE CBC AUTOMATED: CPT

## 2021-12-24 PROCEDURE — 84484 ASSAY OF TROPONIN QUANT: CPT

## 2021-12-24 PROCEDURE — 78452 HT MUSCLE IMAGE SPECT MULT: CPT

## 2021-12-24 PROCEDURE — 93017 CV STRESS TEST TRACING ONLY: CPT

## 2021-12-24 PROCEDURE — 96372 THER/PROPH/DIAG INJ SC/IM: CPT

## 2021-12-24 PROCEDURE — 6370000000 HC RX 637 (ALT 250 FOR IP): Performed by: INTERNAL MEDICINE

## 2021-12-24 PROCEDURE — 80053 COMPREHEN METABOLIC PANEL: CPT

## 2021-12-24 RX ORDER — METOPROLOL TARTRATE 5 MG/5ML
5 INJECTION INTRAVENOUS EVERY 5 MIN PRN
Status: DISCONTINUED | OUTPATIENT
Start: 2021-12-24 | End: 2021-12-24 | Stop reason: ALTCHOICE

## 2021-12-24 RX ORDER — IBUPROFEN 600 MG/1
600 TABLET ORAL EVERY 6 HOURS PRN
Qty: 50 TABLET | Refills: 1 | Status: SHIPPED | OUTPATIENT
Start: 2021-12-24

## 2021-12-24 RX ORDER — ATROPINE SULFATE 0.1 MG/ML
0.5 INJECTION INTRAVENOUS EVERY 5 MIN PRN
Status: DISCONTINUED | OUTPATIENT
Start: 2021-12-24 | End: 2021-12-24 | Stop reason: ALTCHOICE

## 2021-12-24 RX ORDER — IBUPROFEN 600 MG/1
600 TABLET ORAL EVERY 6 HOURS PRN
Status: DISCONTINUED | OUTPATIENT
Start: 2021-12-24 | End: 2021-12-24 | Stop reason: HOSPADM

## 2021-12-24 RX ORDER — TRAMADOL HYDROCHLORIDE 50 MG/1
50 TABLET ORAL EVERY 6 HOURS PRN
Status: DISCONTINUED | OUTPATIENT
Start: 2021-12-24 | End: 2021-12-24 | Stop reason: HOSPADM

## 2021-12-24 RX ORDER — HYDROCODONE BITARTRATE AND ACETAMINOPHEN 5; 325 MG/1; MG/1
1 TABLET ORAL ONCE
Status: COMPLETED | OUTPATIENT
Start: 2021-12-24 | End: 2021-12-24

## 2021-12-24 RX ORDER — TRAMADOL HYDROCHLORIDE 50 MG/1
50 TABLET ORAL EVERY 6 HOURS PRN
Qty: 12 TABLET | Refills: 0 | Status: SHIPPED | OUTPATIENT
Start: 2021-12-24 | End: 2021-12-27

## 2021-12-24 RX ORDER — SODIUM CHLORIDE 9 MG/ML
500 INJECTION, SOLUTION INTRAVENOUS CONTINUOUS PRN
Status: DISCONTINUED | OUTPATIENT
Start: 2021-12-24 | End: 2021-12-24 | Stop reason: ALTCHOICE

## 2021-12-24 RX ORDER — NITROGLYCERIN 0.4 MG/1
0.4 TABLET SUBLINGUAL EVERY 5 MIN PRN
Status: DISCONTINUED | OUTPATIENT
Start: 2021-12-24 | End: 2021-12-24 | Stop reason: ALTCHOICE

## 2021-12-24 RX ORDER — AMINOPHYLLINE DIHYDRATE 25 MG/ML
50 INJECTION, SOLUTION INTRAVENOUS PRN
Status: DISCONTINUED | OUTPATIENT
Start: 2021-12-24 | End: 2021-12-24 | Stop reason: ALTCHOICE

## 2021-12-24 RX ORDER — ALBUTEROL SULFATE 90 UG/1
2 AEROSOL, METERED RESPIRATORY (INHALATION) PRN
Status: DISCONTINUED | OUTPATIENT
Start: 2021-12-24 | End: 2021-12-24 | Stop reason: ALTCHOICE

## 2021-12-24 RX ORDER — SODIUM CHLORIDE 0.9 % (FLUSH) 0.9 %
10 SYRINGE (ML) INJECTION PRN
Status: DISCONTINUED | OUTPATIENT
Start: 2021-12-24 | End: 2021-12-24 | Stop reason: HOSPADM

## 2021-12-24 RX ORDER — SODIUM CHLORIDE 0.9 % (FLUSH) 0.9 %
5-40 SYRINGE (ML) INJECTION PRN
Status: DISCONTINUED | OUTPATIENT
Start: 2021-12-24 | End: 2021-12-24 | Stop reason: ALTCHOICE

## 2021-12-24 RX ADMIN — TRAMADOL HYDROCHLORIDE 50 MG: 50 TABLET, FILM COATED ORAL at 13:53

## 2021-12-24 RX ADMIN — SODIUM CHLORIDE, PRESERVATIVE FREE 10 ML: 5 INJECTION INTRAVENOUS at 09:21

## 2021-12-24 RX ADMIN — TETRAKIS(2-METHOXYISOBUTYLISOCYANIDE)COPPER(I) TETRAFLUOROBORATE 31.56 MILLICURIE: 1 INJECTION, POWDER, LYOPHILIZED, FOR SOLUTION INTRAVENOUS at 11:13

## 2021-12-24 RX ADMIN — SODIUM CHLORIDE, PRESERVATIVE FREE 10 ML: 5 INJECTION INTRAVENOUS at 08:24

## 2021-12-24 RX ADMIN — HYDROCODONE BITARTRATE AND ACETAMINOPHEN 1 TABLET: 5; 325 TABLET ORAL at 01:01

## 2021-12-24 RX ADMIN — TETRAKIS(2-METHOXYISOBUTYLISOCYANIDE)COPPER(I) TETRAFLUOROBORATE 10.9 MILLICURIE: 1 INJECTION, POWDER, LYOPHILIZED, FOR SOLUTION INTRAVENOUS at 09:21

## 2021-12-24 RX ADMIN — SERTRALINE HYDROCHLORIDE 25 MG: 25 TABLET ORAL at 08:22

## 2021-12-24 RX ADMIN — TOPIRAMATE 200 MG: 100 TABLET, FILM COATED ORAL at 08:22

## 2021-12-24 RX ADMIN — SODIUM CHLORIDE, PRESERVATIVE FREE 10 ML: 5 INJECTION INTRAVENOUS at 11:13

## 2021-12-24 RX ADMIN — ATORVASTATIN CALCIUM 10 MG: 10 TABLET, FILM COATED ORAL at 08:22

## 2021-12-24 RX ADMIN — POTASSIUM CHLORIDE 40 MEQ: 1500 TABLET, EXTENDED RELEASE ORAL at 08:45

## 2021-12-24 RX ADMIN — ACETAMINOPHEN 650 MG: 325 TABLET ORAL at 08:22

## 2021-12-24 RX ADMIN — ENOXAPARIN SODIUM 40 MG: 100 INJECTION SUBCUTANEOUS at 08:21

## 2021-12-24 RX ADMIN — SODIUM CHLORIDE, PRESERVATIVE FREE 10 ML: 5 INJECTION INTRAVENOUS at 09:20

## 2021-12-24 RX ADMIN — ASPIRIN 325 MG: 325 TABLET, COATED ORAL at 08:22

## 2021-12-24 RX ADMIN — REGADENOSON 0.4 MG: 0.08 INJECTION, SOLUTION INTRAVENOUS at 09:19

## 2021-12-24 ASSESSMENT — PAIN - FUNCTIONAL ASSESSMENT: PAIN_FUNCTIONAL_ASSESSMENT: PREVENTS OR INTERFERES SOME ACTIVE ACTIVITIES AND ADLS

## 2021-12-24 ASSESSMENT — PAIN SCALES - GENERAL
PAINLEVEL_OUTOF10: 3
PAINLEVEL_OUTOF10: 7
PAINLEVEL_OUTOF10: 0
PAINLEVEL_OUTOF10: 3
PAINLEVEL_OUTOF10: 8

## 2021-12-24 ASSESSMENT — PAIN DESCRIPTION - PAIN TYPE: TYPE: ACUTE PAIN

## 2021-12-24 ASSESSMENT — PAIN DESCRIPTION - ORIENTATION: ORIENTATION: RIGHT;LEFT

## 2021-12-24 ASSESSMENT — PAIN DESCRIPTION - LOCATION: LOCATION: CHEST

## 2021-12-24 ASSESSMENT — PAIN DESCRIPTION - ONSET: ONSET: ON-GOING

## 2021-12-24 ASSESSMENT — PAIN DESCRIPTION - FREQUENCY: FREQUENCY: CONTINUOUS

## 2021-12-24 ASSESSMENT — PAIN DESCRIPTION - PROGRESSION: CLINICAL_PROGRESSION: NOT CHANGED

## 2021-12-24 ASSESSMENT — PAIN DESCRIPTION - DESCRIPTORS: DESCRIPTORS: ACHING

## 2021-12-24 NOTE — CARE COORDINATION
Case Management Initial Discharge Plan  Burnell Snellen,             Met with:patient to discuss discharge plans. Information verified: address, contacts, phone number, , insurance Yes  Insurance Provider: [facility] [place]    Emergency Contact/Next of Kin name & number: Stew Alcala, spouse, [phone] and Joanie Talamantes, child, [phone]  Who are involved in patient's support system? Spouse and child    PCP: Tono Fletcher NP Date of last visit: end     Discharge Planning    Living Arrangements: Home with spouse  Home has 0 stories  0 stairs to climb to get into front door, 0 stairs to climb to reach second floor  Location of bedroom/bathroom in home first floor    Patient able to perform ADL's:Independent    Current Services (outpatient & in home) n/a  DME equipment: n/a  DME provider: n/a    Is patient receiving oral anticoagulation therapy? No    If indicated:   Physician managing anticoagulation treatment: n/a  Where does patient obtain lab work for ATC treatment? n/a      Potential Assistance Needed:       Patient agreeable to home care: n/a  Freedom of choice provided:  n/a    Prior SNF/Rehab Placement and Facility: n/a  Agreeable to SNF/Rehab: No  [place] of choice provided: no     Evaluation: no    Expected Discharge date: 21  Patient expects to be discharged to:  Home independent with spouse  If home: is the family and/or caregiver wiling & able to provide support at home? Yes  Who will be providing this support? spouse    Follow Up Appointment: Best Day/ Time: Morning  Transportation provider: Spouse  Transportation arrangements needed for discharge: No    Readmission Risk              Risk of Unplanned Readmission:  0             Does patient have a readmission risk score greater than 14?: No  If yes, follow-up appointment must be made within 7 days of discharge.      Goals of Care: Monitor Troponin       Educated patient on transitional options, provided freedom of choice and are agreeable with plan      Discharge Plan: Home independent with spouse          Electronically signed by Jameson Coe RN on 12/24/21 at 7:50 AM EST

## 2021-12-24 NOTE — ED NOTES
Perfect Serve to [address] or Facility: [facility] Emergency Department Alejandro Duarte NEW ADMISSION From: Dr Dee Fleming RE: Maite Kevin: [id number] Admit to [address] [place] [address], RN  12/23/21 2046

## 2021-12-24 NOTE — PROGRESS NOTES
Pt anmbulating in room and to and from the bathroom; gait and balance steady; pt refuses SCD's at this time

## 2021-12-24 NOTE — DISCHARGE SUMMARY
[facility]  Office: [address], DO, Tala Din, DO, Christian Acron, DO, Darrius Argueta Blood, DO, Brenna Browning MD, Cedric Concepcion MD, Tawnya Colon MD, Preethi Whittaker MD, Emerald Gaston MD, Manuel Zheng MD, Peggy Fox MD, Darrel Vann, DO, Remi Smart, DO, Verito Sommers MD,  Trell Willoughby, DO, Get Davidson MD, Eliza Monroy MD, Vy Traore MD, Johnathan Metcalf MD, Ketan Dooley MD, Pricila Amato MD, Shea Granado MD, Jackie Santana, [facility], [facility] Richard, CNP, Jessica Farris, CNP, Ashleigh Berg, CNS, Patricia Tafoya, CNP, Adelia Alpers, CNP, Chava Low, CNP, Ayana Smith, CNP, Dona Beebe, CNP, Leanna Berry PA-C, Vanita Durham, [facility], Jose Hogan, [facility], Robert Echavarria, CNP, Мария Garcia, CNP, Leonel Norman, CNP, Lennox Simon, CNP, Silverio Trinidad, [facility], Alondra Rizvi, [facility]    Discharge Summary     Patient ID: Brian Renee  :  1975   MRN: [id number]     ACCOUNT:  [de-identified]   Patient's PCP: Vanessa Acosta DO  Admit Date: 2021   Discharge Date: 2021     Length of Stay: 0  Code Status:  Full Code  Admitting Physician: No admitting provider for patient encounter. Discharge Physician: Chris Martinez DO     Active Discharge Diagnoses:     Hospital Problem Lists:  Principal Problem:    Musculoskeletal chest pain  Active Problems:    Bipolar disorder ([facility] [address]    Charcot-Melissa-Tooth disease    History of closed head injury    Elevated troponin  Resolved Problems:    * No resolved hospital problems.  *      Admission Condition:  fair     Discharged Condition: stable    Hospital Stay:     Hospital Course:  Brian Renee is a 42 y.o. female who was admitted for the management of  Musculoskeletal chest pain , presented to ER with Chest Pain, Headache, and Nausea    Is a 44-year-old female presents with a complaint of left-sided chest pain with radiation into the left arm with associated ACC/AATS/AHA/ASE/ASNC/SCAI/SCCT/STS 2017 Appropriate Use Criteria For Coronary Revascularization in Patients With Stable Ischemic Heart Disease [facility] Volume 69, Issue 17, May 2017 High risk (>3% annual death or MI) 1. Severe resting LV dysfunction (LVEF >35%) not readily explained by non coronary causes 2. Resting perfusion abnormalities greater than 10% of the myocardium in patients without prior history or evidence of MI 3. Stress-induced perfusion abnormalities encumbering greater than or equal to 10% myocardium or stress segmental scores indicating multiple vascular territories with abnormalities 4. Stress-induced LV dilatation (TID ratio greater than 1.19 for exercise and greater than 1.39 for regadenoson) Intermediate risk (1% to 3% annual death or MI) 1. Mild/moderate resting LV dysfunction (LVEF 35% to 49%) not readily explained by non coronary causes. 2.  Resting perfusion abnormalities in 5%-9.9% of the myocardium in patients without a history or prior evidence of MI 3. Stress-induced perfusion abnormality encumbering 5%-9.9% of the myocardium or stress segmental scores indicating 1 vascular territory with abnormalities but without LV dilation 4. Small wall motion abnormality involving 1-2 segments and only 1 coronary bed. Low Risk (Less than 1% annual death or MI) 1. Normal or small myocardial perfusion defect at rest or with stress encumbering less than 5% of the myocardium. RECOMMENDATIONS: Unavailable       Consultations:    Consults:     Final Specialist Recommendations/Findings:   None      The patient was seen and examined on day of discharge and this discharge summary is in conjunction with any daily progress note from day of discharge. Discharge plan:     Disposition: Home    Physician Follow Up:   PCP 1 week  No follow-up provider specified.      Requiring Further Evaluation/Follow Up POST HOSPITALIZATION/Incidental Findings: Labs at discretion of PCP    Diet: cardiac diet    Activity: As tolerated    Instructions to Patient: Take medication as prescribed    Discharge Medications:      Medication List      START taking these medications    ibuprofen 600 MG tablet  Commonly known as: ADVIL;MOTRIN  Take 1 tablet by mouth every 6 hours as needed for Pain     traMADol 50 MG tablet  Commonly known as: ULTRAM  Take 1 tablet by mouth every 6 hours as needed for Pain for up to 3 days. CONTINUE taking these medications    ALPRAZolam 0.5 MG tablet  Commonly known as: XANAX     atorvastatin 10 MG tablet  Commonly known as: LIPITOR     Emgality 120 MG/ML Soaj  Generic drug: Galcanezumab-gnlm     promethazine 12.5 MG tablet  Commonly known as: PHENERGAN     rizatriptan 10 MG tablet  Commonly known as: MAXALT     sertraline 25 MG tablet  Commonly known as: ZOLOFT     tiZANidine 4 MG tablet  Commonly known as: ZANAFLEX     Topamax 25 MG tablet  Generic drug: topiramate           Where to Get Your Medications      These medications were sent to [address] [place] [address] Way [address]    Phone: [phone]   ibuprofen 600 MG tablet  traMADol 50 MG tablet         No discharge procedures on file. Time Spent on discharge is  26 minutes in patient examination, evaluation, counseling as well as medication reconciliation, prescriptions for required medications, discharge plan and follow up. Electronically signed by   Marcial Florez DO  12/24/2021  4:22 PM      Thank you Dr. Taylor Garcia DO for the opportunity to be involved in this patient's care.

## 2021-12-24 NOTE — ED PROVIDER NOTES
ATTENDING  ADDENDUM     Care of this patient was assumed from Dr. Francesco Agrawal. The patient was seen for Chest Pain, Headache, and Nausea  . The patient's initial evaluation and plan have been discussed with the prior provider who initially evaluated the patient. Nursing Notes, Past Medical Hx, Past Surgical Hx, Social Hx, Allergies, and Family Hx were all reviewed. Reevaluation: Patient has remained hemodynamically stable however her troponins have trended up started at 25 and ended up at 28. EKG was nonischemic the CTs that were done were negative. Patient has no PE or any sign of any cough or dissection. However the patient is going to be admitted to the hospital due to trending upwards of her troponins. I spoke with our hospitalist Sis Ren for the admission. DIFFERENTIAL DIAGNOSIS/ MDM:           Follow Exit Care instructions closely. I have reviewed the disposition diagnosis with the patient and or their family/guardian. I have answered their questions and given discharge instructions. They voiced understanding of these instructions and did not have any further questions or complaints. DIAGNOSTIC RESULTS     RADIOLOGY:   Non-plain film images such as CT, Ultrasound and MRI are read by the radiologist. Plain radiographic images are visualized and preliminarily interpreted by the emergency physician with the below findings:  CTA CHEST [address]   Final Result   1. No acute vascular injury. 2. No evidence for aneurysm or dissection. 3. No direct evidence for acute infective or inflammatory process. 4. Liquid stool in the descending colon down to the rectum suggestive of   diarrheal disease. RECOMMENDATIONS:   Unavailable             LABS:  Results for orders placed or performed during the hospital encounter of 12/23/21   COVID-19, Rapid    Specimen: Nasopharyngeal Swab   Result Value Ref Range    Specimen Description . NASOPHARYNGEAL SWAB SARS-CoV-2, Rapid Not Detected Not Detected   Basic Metabolic Panel   Result Value Ref Range    Glucose 96 70 - 99 mg/dL    BUN 16 6 - 20 mg/dL    CREATININE 0.65 0.50 - 0.90 mg/dL    Bun/Cre Ratio NOT REPORTED 9 - 20    Calcium 9.1 8.6 - 10.4 mg/dL    Sodium 140 135 - 144 mmol/L    Potassium 4.1 3.7 - 5.3 mmol/L    Chloride 109 (H) 98 - 107 mmol/L    CO2 20 20 - 31 mmol/L    Anion Gap 11 9 - 17 mmol/L    GFR Non-African American >60 >60 mL/min    GFR African American >60 >60 mL/min    GFR Comment          GFR Staging NOT REPORTED    CBC Auto Differential   Result Value Ref Range    WBC 8.3 3.5 - 11.0 k/uL    RBC 4.83 4.0 - 5.2 m/uL    Hemoglobin 14.7 12.0 - 16.0 g/dL    Hematocrit 44.6 36 - 46 %    MCV 92.3 80 - 100 fL    MCH 30.5 26 - 34 pg    MCHC 33.0 31 - 37 g/dL    RDW 13.8 12.5 - 15.4 %    Platelets 609 021 - 149 k/uL    MPV 7.7 6.0 - 12.0 fL    NRBC Automated NOT REPORTED per 100 WBC    Differential Type NOT REPORTED     Seg Neutrophils 85 (H) 36 - 66 %    Lymphocytes 9 (L) 24 - 44 %    Monocytes 5 2 - 11 %    Eosinophils % 1 1 - 4 %    Basophils 0 0 - 2 %    Immature Granulocytes NOT REPORTED 0 %    Segs Absolute 7.10 1.8 - 7.7 k/uL    Absolute Lymph # 0.70 (L) 1.0 - 4.8 k/uL    Absolute Mono # 0.40 0.1 - 1.2 k/uL    Absolute Eos # 0.10 0.0 - 0.4 k/uL    Basophils Absolute 0.00 0.0 - 0.2 k/uL    Absolute Immature Granulocyte NOT REPORTED 0.00 - 0.30 k/uL    WBC Morphology NOT REPORTED     RBC Morphology NOT REPORTED     Platelet Estimate NOT REPORTED    Hepatic Function Panel   Result Value Ref Range    Albumin 4.9 3.5 - 5.2 g/dL    Alkaline Phosphatase 77 35 - 104 U/L    ALT 29 5 - 33 U/L    AST 24 <32 U/L    Total Bilirubin 0.35 0.3 - 1.2 mg/dL    Bilirubin, Direct 0.10 <0.31 mg/dL    Bilirubin, Indirect 0.25 0.00 - 1.00 mg/dL    Total Protein 7.9 6.4 - 8.3 g/dL    Globulin NOT REPORTED 1.5 - 3.8 g/dL    Albumin/Globulin Ratio 1.6 1.0 - 2.5   Lipase   Result Value Ref Range    Lipase 38 13 - 60 U/L Troponin   Result Value Ref Range    Troponin, High Sensitivity 25 (H) 0 - 14 ng/L    Troponin T NOT REPORTED <0.03 ng/mL    Troponin Interp NOT REPORTED    Troponin   Result Value Ref Range    Troponin, High Sensitivity 26 (H) 0 - 14 ng/L    Troponin T NOT REPORTED <0.03 ng/mL    Troponin Interp NOT REPORTED    Troponin   Result Value Ref Range    Troponin, High Sensitivity 28 (H) 0 - 14 ng/L    Troponin T NOT REPORTED <0.03 ng/mL    Troponin Interp NOT REPORTED    EKG 12 Lead   Result Value Ref Range    Ventricular Rate 83 BPM    Atrial Rate 83 BPM    P-R Interval 130 ms    QRS Duration 92 ms    Q-T Interval 356 ms    QTc Calculation (Bazett) 418 ms    P Axis 67 degrees    R Axis 63 degrees    T Axis 46 degrees   EKG 12 Lead   Result Value Ref Range    Ventricular Rate 74 BPM    Atrial Rate 74 BPM    P-R Interval 138 ms    QRS Duration 82 ms    Q-T Interval 374 ms    QTc Calculation (Bazett) 415 ms    P Axis 68 degrees    R Axis 44 degrees    T Axis 45 degrees       ABNORMAL LABS:  Labs Reviewed   BASIC METABOLIC PANEL - Abnormal; Notable for the following components:       Result Value    Chloride 109 (*)     All other components within normal limits   CBC WITH AUTO DIFFERENTIAL - Abnormal; Notable for the following components:    Seg Neutrophils 85 (*)     Lymphocytes 9 (*)     Absolute Lymph # 0.70 (*)     All other components within normal limits   TROPONIN - Abnormal; Notable for the following components:    Troponin, High Sensitivity 25 (*)     All other components within normal limits   TROPONIN - Abnormal; Notable for the following components:    Troponin, High Sensitivity 26 (*)     All other components within normal limits   TROPONIN - Abnormal; Notable for the following components:    Troponin, High Sensitivity 28 (*)     All other components within normal limits   COVID-19, RAPID   HEPATIC FUNCTION PANEL   LIPASE        EKG:      EMERGENCY DEPARTMENT COURSE:   Vitals:    Vitals:    12/23/21 1257 12/23/21 1612 12/23/21 1855   BP: 125/87 117/81 118/78   Pulse: 81 76 78   Resp: 16 16 16   Temp: 97.8 °F (36.6 °C)     TempSrc: Oral     SpO2: 100% 99% 96%   Weight: 60.8 kg (134 lb)     Height: 5' 2\" (1.575 m)       -------------------------  BP: 118/78, Temp: 97.8 °F (36.6 °C), Pulse: 78, Resp: 16          FINAL IMPRESSION      1. Chest pain, unspecified type    2. Elevated troponin          DISPOSITION/PLAN   DISPOSITION Decision To Admit 12/23/2021 07:11:55 PM    I have reviewed the disposition diagnosis with the patient and or their family/guardian. I have answered their questions and given discharge instructions. They voiced understanding of these instructions and did not have any further questions or complaints. Condition on Disposition    Stable    PATIENT REFERRED TO:  No follow-up provider specified.     DISCHARGE MEDICATIONS:  New Prescriptions    No medications on file       (Please note that portions of this note were completed with a voice recognition program.  Efforts were made to edit the dictations but occasionally words are mis-transcribed.)    Stanley Mckeon MD,, MD  Attending Emergency Physician       Stanley Mckeon MD  12/23/21 2055       Stanley Mckeon MD  12/23/21 2055

## 2021-12-24 NOTE — PROGRESS NOTES
Upon assessment of the nerve stimulator in the pts right upper back, it is extremely tender to palpation

## 2021-12-24 NOTE — PROGRESS NOTES
Patient present for lexiscan stress test. Educated on procedure. All questions/concerns addressed. Allergies and medication reviewed. Patient prepped for procedure. Stress test will be supervised by SUZANNE Eden.

## 2021-12-24 NOTE — PROGRESS NOTES
Lexiscan stress test completed and reviewed by SUZANNE Orozco. Patient experienced feeling flushed/hot, and dizziness. Patient had 8/10 chest pain prior to injection which was essentially unchanged. . PO caffeine provided. Symptoms resolved in recovery other than the chest pain. CNP deferred treatment at this time. IV remained in place as patient is going back to inpatient unit. VS returned to baseline, see flow sheets for documented VS throughout procedure.

## 2021-12-24 NOTE — PROCEDURES
[address]                [address] [facility] [address]                              CARDIAC STRESS TEST    PATIENT NAME: Cherelle Gupta                    :        1975  MED REC NO:   [id number]                             ROOM:       ER11  ACCOUNT NO:   [de-identified]                           ADMIT DATE: 2021  PROVIDER:     Estephanie Negro MD    CARDIOVASCULAR DIAGNOSTIC DEPARTMENT    DATE OF STUDY:  2021    ORDERING PROVIDER:  Jayleen Herbert    PRIMARY CARE PROVIDER:  [facility]:  SYLVIA CALZADA    TEST TYPE: LEXISCAN CARDIOLYTE STRESS TEST  INDICATION: CHEST PAIN    RESTING HEART RATE: 73 bpm  RESTING BLOOD PRESSURE: 119/79 mmHg  PEAK HEART RATE: 116 bpm  PEAK BLOOD PRESSURE: 139/89 mmHg    MEDICATION(S) GIVEN: 0.4 mg IV LEXISCAN. PO CAFFEINE. REASON FOR TERMINATION: MEDICATION INFUSION COMPLETE  SYMPTOMS: FLUSHING AND DIZZINESS POST INJECTION. 8/10 CHEST PAIN PRE  INJECTION. RESOLVED IN RECOVERY. RESTING EKG: NORMAL. STRESS HEART RESPONSE: NORMAL RESPONSE.  BLOOD PRESSURE RESPONSE: APPROPRIATE. STRESS EKGs: NORMAL. CHEST DISCOMFORT: NO PAIN DURING STRESS. ISCHEMIC EKG CHANGES: NONE.    EKG IMPRESSION: NEGATIVE ELECTROCARDIOGRAPHICALLY LEXISCAN STRESS TEST. NUCLEAR SCAN TO FOLLOW.       Joaquin Lynch MD    D: 2021 12:00:26       T: 2021 12:07:30     /JNISSEN  Job#: [id number]     Doc#: Unknown    CC:    (Retain this field even if not dictated or not decipherable)

## 2021-12-24 NOTE — PROGRESS NOTES
Pt to flr awake, alert and oriented x4; no s/s of distress or discomfort observed at this time; no req or concerns verbalized at this time; P's and comfort measures addressed; v/s obtained, stable.

## 2021-12-24 NOTE — PROGRESS NOTES
Pt states that she believes her chest pain is a direct cause of the nerve stimulator in her right upper back; per pt the stimulator has been dislodged and she has been working with her insurance to have the procedure for removal covered

## 2021-12-25 LAB
CHOLESTEROL/HDL RATIO: 2.3
CHOLESTEROL: 107 MG/DL
HDLC SERPL-MCNC: 47 MG/DL
LDL CHOLESTEROL: 42 MG/DL (ref 0–130)
TRIGL SERPL-MCNC: 92 MG/DL
VLDLC SERPL CALC-MCNC: NORMAL MG/DL (ref 1–30)

## 2023-08-03 NOTE — H&P
Assessment/Plan:  Pt is a 6 MO male who presents c/o 5 days of low-grade fever (unknown Tmax) as well as tugging at b/l ears, clear nasal discharge, cough, and symptoms of teething. Physical exam benign with no signs of otitis media. Oral exam demonstrates tooth eruption without erythema of gums. Afebrile throughout day today and at time of assessment in office, with no tylenol administration at or prior to visit. Mother advised of  of potential URI +/- symptoms of teething. Counseled on updated tylenol and motrin dosage for weight and given updated dosing information. Return PRN for same problem or for next scheduled well     No problem-specific Assessment & Plan notes found for this encounter. Diagnoses and all orders for this visit:    Viral URI    Teething infant          Subjective:      Patient ID: Precious Gambino is a 6 m.o. male. Low grade fever for 4 days, responds to tylenol. No tylenol today, also no fevers on day of visit. Tugging at b/l ears for a week or so. Eating/drinking ok, sleeping poorly. Sometimes gas, waking up wanting to nurse sometimes. Wakes 4 times/night, is consolable and able to return to sleep. Is at home with caretaker, not currently in . (+) sick contact in form of sister with bacterial conjunctivitis      The following portions of the patient's history were reviewed and updated as appropriate: allergies, current medications, past family history, past medical history, past social history, past surgical history and problem list.    Review of Systems   Constitutional: Positive for fever. Negative for activity change. HENT: Negative for congestion, nosebleeds and trouble swallowing. Eyes: Negative for visual disturbance. Respiratory: Negative for cough and choking. Cardiovascular: Negative for leg swelling. Gastrointestinal: Negative for constipation, diarrhea and vomiting. Genitourinary: Negative for decreased urine volume.    Musculoskeletal: [facility]  Office: [address], DO, Alen Weiss, DO, Eduardo Aguilar, DO, Debbie [facility], DO, Dona Cleveland MD, Earnest Tran MD, Dorene Bradshaw MD, Rodney Johnson MD, Paulino Alcantara MD, Cristy Hammond MD, Jewel Reyes MD, Yovanny Espana, DO, Erasmo Murillo DO, Ora Rivas MD,  Мария Victor, DO, Mickie Murry MD, Hermilo Hammond MD, Rocío Davidson MD, Bart An MD, Juhi Gibbs MD, Lucia Dowd MD, Matt Puente MD, Griselda Unger, [facility], [facility], CNP, Soumya Tello, CNP, Amy Barry, CNS, Luzma Grimaldo, CNP, Margareth Valdez, CNP, Silva Ellis, CNP, Prudence Steele, CNP, Ilya Manuel, CNP, Jordan Nj PA-C, Kian Caba, [facility], Satnam Lubin, [facility], Raj Perrin, CNP, Christos Melton, CNP, Avery Hernández, CNP, Marika Garcia, CNP, Yuli Walls, CNP, Georges Biggs, [facility]   [address] [facility]    HISTORY AND PHYSICAL EXAMINATION            Date:   12/24/2021  Patient name:  Char Kellogg  Date of admission:  12/23/2021 12:50 PM  MRN:   [id number]  Account:  [de-identified]  YOB: 1975  PCP:    Leana Steinberg DO  Room:   334/334-01  Code Status:    Full Code    Chief Complaint:     Chief Complaint   Patient presents with    Chest Pain    Headache    Nausea       History Obtained From:     patient    History of Present Illness:     Char Kellogg is a 42 y.o. Non- / non  female who presents with Chest Pain, Headache, and Nausea   and is admitted to the hospital for the management of Musculoskeletal chest pain. This is a 43-year-old female that presents with left-sided chest pain that radiates into the left arm with associated nausea has been present for approximately 1 week. She has taken Tylenol for her pain without relief. She denies any exacerbating factors, does not worsen with movement or activity. She had no relief with nitroglycerin here.   She is found to have mildly elevated Negative for extremity weakness. Skin: Negative for rash. Allergic/Immunologic: Negative for food allergies. Neurological: Negative for seizures. Hematological: Negative for adenopathy. Objective:      Temp 98.8 °F (37.1 °C)   Wt 10.3 kg (22 lb 10 oz)          Physical Exam  Constitutional:       General: He is active. He is not in acute distress. Appearance: He is not toxic-appearing. Comments: Mildly fatigued affect    HENT:      Head: Normocephalic and atraumatic. Anterior fontanelle is flat. Right Ear: Tympanic membrane, ear canal and external ear normal.      Left Ear: Tympanic membrane, ear canal and external ear normal.      Nose: Nose normal.      Mouth/Throat:      Mouth: Mucous membranes are moist.      Pharynx: Oropharynx is clear. Eyes:      General:         Right eye: No discharge. Left eye: No discharge. Extraocular Movements: Extraocular movements intact. Conjunctiva/sclera: Conjunctivae normal.   Cardiovascular:      Rate and Rhythm: Normal rate and regular rhythm. Pulses: Normal pulses. Heart sounds: Normal heart sounds. No murmur heard. No friction rub. No gallop. Pulmonary:      Effort: Pulmonary effort is normal. No nasal flaring or retractions. Breath sounds: Normal breath sounds. No stridor. Abdominal:      General: Abdomen is flat. Palpations: Abdomen is soft. There is no mass. Tenderness: There is no abdominal tenderness. There is no guarding. Musculoskeletal:         General: Normal range of motion. Cervical back: Normal range of motion. Right hip: Negative right Ortolani and negative right Lebron. Left hip: Negative left Ortolani and negative left Lebron. Lymphadenopathy:      Cervical: No cervical adenopathy. Skin:     General: Skin is warm. Capillary Refill: Capillary refill takes less than 2 seconds. Turgor: Normal.   Neurological:      General: No focal deficit present. troponins and was admitted for further evaluation. Past Medical History:     Past Medical History:   Diagnosis Date    Anxiety     Bladder disorder     nerve damage, has stimulator    CMT (Charcot-Melissa-Tooth disease)     Depression     GERD (gastroesophageal reflux disease)     constipation    Hypertension     Migraines     Neuropathy     Neuropathy involving both lower extremities     Psychiatric problem         Past Surgical History:     Past Surgical History:   Procedure Laterality Date    BACK SURGERY      Neck pates, rods and screws, spinal stimulator    BLADDER SURGERY      bladder stimulator    CERVICAL SPINE SURGERY      implanted spinal stimulator    CHOLECYSTECTOMY      COLONOSCOPY      DILATATION, ESOPHAGUS      ENDOSCOPY, COLON, DIAGNOSTIC      HYSTERECTOMY      KNEE ARTHROSCOPY Bilateral     NECK SURGERY  08/2012    hardware    SPINE SURGERY      spinal stimulator        Medications Prior to Admission:     Prior to Admission medications    Medication Sig Start Date End Date Taking? Authorizing Provider   traMADol (ULTRAM) 50 MG tablet Take 1 tablet by mouth every 6 hours as needed for Pain for up to 3 days. 12/24/21 12/27/21 Yes Rodney Espinoza DO   ibuprofen (ADVIL;MOTRIN) 600 MG tablet Take 1 tablet by mouth every 6 hours as needed for Pain 12/24/21  Yes Rodney Espinoza DO   atorvastatin (LIPITOR) 10 MG tablet Take 10 mg by mouth daily 6/25/21  Yes Historical Provider, MD   Galcanezumab-gnlm (EMGALITY) 120 MG/ML SOAJ every 30 days Emgality Pen 120 mg/mL subcutaneous pen injector  inject 1 milliliter ( 120 milligrams ) subcutaneously and INTO TH. ..  (REFER TO PRESCRIPTION    Historical Provider, MD   ALPRAZolam (XANAX) 0.5 MG tablet Take 0.5 mg by mouth 3 times daily as needed.     Historical Provider, MD   sertraline (ZOLOFT) 25 MG tablet Take 25 mg by mouth daily    Historical Provider, MD   topiramate (TOPAMAX) 25 MG tablet Take 200 mg by mouth 2 times daily     Historical Mental Status: He is alert. Provider, MD   tiZANidine (ZANAFLEX) 4 MG tablet Take 2 mg by mouth every 6 hours as needed (2 mg in AM, 2mg at noon and 4mg at bedtime)     Historical Provider, MD   rizatriptan (MAXALT) 10 MG tablet  4/18/18   Historical Provider, MD   promethazine (PHENERGAN) 12.5 MG tablet Take 25 mg by mouth     Historical Provider, MD        Allergies:     Latex, Azithromycin, Lamictal [lamotrigine], Pcn [penicillins], Compazine [prochlorperazine maleate], Lexapro [escitalopram oxalate], Buspar [buspirone], and Zofran [ondansetron hcl]    Social History:     Tobacco:    reports that she has never smoked. She has never used smokeless tobacco.  Alcohol:      reports no history of alcohol use. Drug Use:  reports no history of drug use. Family History:     Family History   Problem Relation Age of Onset    Parkinsonism Other         great grandmother    Coronary Art Dis Father     Stroke Father     Mental Illness Father     Cancer Father        Review of Systems:     Positive and Negative as described in HPI.     CONSTITUTIONAL:  negative for fevers, chills, sweats, fatigue, weight loss  HEENT:  negative for vision, hearing changes, runny nose, throat pain  RESPIRATORY:  negative for shortness of breath, cough, congestion, wheezing  CARDIOVASCULAR:  negative for chest pain, palpitations  GASTROINTESTINAL:  negative for nausea, vomiting, diarrhea, constipation, change in bowel habits, abdominal pain   GENITOURINARY:  negative for difficulty of urination, burning with urination, frequency   INTEGUMENT:  negative for rash, skin lesions, easy bruising   HEMATOLOGIC/LYMPHATIC:  negative for swelling/edema   ALLERGIC/IMMUNOLOGIC:  negative for urticaria , itching  ENDOCRINE:  negative increase in drinking, increase in urination, hot or cold intolerance  MUSCULOSKELETAL:  negative joint pains, muscle aches, swelling of joints  NEUROLOGICAL:  negative for headaches, dizziness, lightheadedness, numbness, pain, tingling extremities  BEHAVIOR/PSYCH:  negative for depression, anxiety    Physical Exam:   /78   Pulse 73   Temp 97.5 °F (36.4 °C) (Oral)   Resp 18   Ht 5' 2\" (1.575 m)   Wt 138 lb 7.2 oz (62.8 kg)   SpO2 99%   BMI 25.32 kg/m²   Temp (24hrs), Av.8 °F (36.6 °C), Min:97.5 °F (36.4 °C), Max:98.1 °F (36.7 °C)    No results for input(s): POCGLU in the last 72 hours.   No intake or output data in the 24 hours ending 21 1622    General Appearance:  alert, well appearing, and in no acute distress  Mental status: oriented to person, place, and time  Head:  normocephalic, atraumatic  Eye: no icterus, redness, pupils equal and reactive, extraocular eye movements intact, conjunctiva clear  Ear: normal external ear, no discharge, hearing intact  Nose:  no drainage noted  Mouth: mucous membranes moist  Neck: supple, no carotid bruits, thyroid not palpable  Lungs: Bilateral equal air entry, clear to ausculation, no wheezing, rales or rhonchi, normal effort  Cardiovascular: normal rate, regular rhythm, no murmur, gallop, rub  Abdomen: Soft, nontender, nondistended, normal bowel sounds, no hepatomegaly or splenomegaly  Neurologic: There are no new focal motor or sensory deficits, normal muscle tone and bulk, no abnormal sensation, normal speech, cranial nerves II through XII grossly intact  Skin: No gross lesions, rashes, bruising or bleeding on exposed skin area  Extremities:  peripheral pulses palpable, no pedal edema or calf pain with palpation  Psych: normal affect     Investigations:      Laboratory Testing:  Recent Results (from the past 24 hour(s))   EKG 12 Lead    Collection Time: 21  5:58 PM   Result Value Ref Range    Ventricular Rate 74 BPM    Atrial Rate 74 BPM    P-R Interval 138 ms    QRS Duration 82 ms    Q-T Interval 374 ms    QTc Calculation (Bazett) 415 ms    P Axis 68 degrees    R Axis 44 degrees    T Axis 45 degrees   Troponin    Collection Time: 21  6:28 PM   Result Value Ref Range Troponin, High Sensitivity 28 (H) 0 - 14 ng/L    Troponin T NOT REPORTED <0.03 ng/mL    Troponin Interp NOT REPORTED    COVID-19, Rapid    Collection Time: 12/23/21  7:26 PM    Specimen: Nasopharyngeal Swab   Result Value Ref Range    Specimen Description . NASOPHARYNGEAL SWAB     SARS-CoV-2, Rapid Not Detected Not Detected   Troponin    Collection Time: 12/23/21 11:09 PM   Result Value Ref Range    Troponin, High Sensitivity 24 (H) 0 - 14 ng/L    Troponin T NOT REPORTED <0.03 ng/mL    Troponin Interp NOT REPORTED    Comprehensive Metabolic Panel w/ Reflex to MG    Collection Time: 12/24/21  3:20 AM   Result Value Ref Range    Glucose 89 70 - 99 mg/dL    BUN 8 6 - 20 mg/dL    CREATININE 0.43 (L) 0.50 - 0.90 mg/dL    Bun/Cre Ratio NOT REPORTED 9 - 20    Calcium 7.9 (L) 8.6 - 10.4 mg/dL    Sodium 138 135 - 144 mmol/L    Potassium 3.2 (L) 3.7 - 5.3 mmol/L    Chloride 112 (H) 98 - 107 mmol/L    CO2 17 (L) 20 - 31 mmol/L    Anion Gap 9 9 - 17 mmol/L    Alkaline Phosphatase 52 35 - 104 U/L    ALT 21 5 - 33 U/L    AST 16 <32 U/L    Total Bilirubin 0.35 0.3 - 1.2 mg/dL    Total Protein 5.5 (L) 6.4 - 8.3 g/dL    Albumin 3.6 3.5 - 5.2 g/dL    Albumin/Globulin Ratio 1.9 1.0 - 2.5    GFR Non-African American >60 >60 mL/min    GFR African American >60 >60 mL/min    GFR Comment          GFR Staging NOT REPORTED    Magnesium    Collection Time: 12/24/21  3:20 AM   Result Value Ref Range    Magnesium 1.8 1.6 - 2.6 mg/dL   CBC    Collection Time: 12/24/21  3:20 AM   Result Value Ref Range    WBC 5.2 3.5 - 11.0 k/uL    RBC 3.83 (L) 4.0 - 5.2 m/uL    Hemoglobin 11.7 (L) 12.0 - 16.0 g/dL    Hematocrit 35.5 (L) 36 - 46 %    MCV 92.5 80 - 100 fL    MCH 30.6 26 - 34 pg    MCHC 33.0 31 - 37 g/dL    RDW 14.1 12.5 - 15.4 %    Platelets 675 537 - 728 k/uL    MPV 7.7 6.0 - 12.0 fL    NRBC Automated NOT REPORTED per 100 WBC   Troponin    Collection Time: 12/24/21  3:20 AM   Result Value Ref Range    Troponin, High Sensitivity 19 (H) 0 - 14 ng/L Troponin T NOT REPORTED <0.03 ng/mL    Troponin Interp NOT REPORTED    EKG 12 lead    Collection Time: 12/24/21  5:35 AM   Result Value Ref Range    Ventricular Rate 71 BPM    Atrial Rate 71 BPM    P-R Interval 144 ms    QRS Duration 92 ms    Q-T Interval 388 ms    QTc Calculation (Bazett) 421 ms    P Axis 70 degrees    R Axis 51 degrees    T Axis 22 degrees   HCG Qualitative, Serum    Collection Time: 12/24/21  8:09 AM   Result Value Ref Range    hCG Qual NEGATIVE NEGATIVE       Imaging/Diagnostics:  CTA CHEST ABDOMEN PELVIS W CONTRAST    Result Date: 12/23/2021  1. No acute vascular injury. 2. No evidence for aneurysm or dissection. 3. No direct evidence for acute infective or inflammatory process. 4. Liquid stool in the descending colon down to the rectum suggestive of diarrheal disease. 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 Serial cardiac enzymes  2. Continue home maintenance medications  3. GI DVT prophylaxis  4. Stress test today, further recommendation plans per stress test results.   If negative will discharge home with outpatient follow-up and work-up    Consultations:   None        Harjit Mosqueda DO  12/24/2021  4:22 PM    Copy sent to Dr. Harmony Loving DO

## 2025-06-09 ENCOUNTER — APPOINTMENT (OUTPATIENT)
Dept: GENERAL RADIOLOGY | Age: 50
End: 2025-06-09
Payer: COMMERCIAL

## 2025-06-09 ENCOUNTER — HOSPITAL ENCOUNTER (EMERGENCY)
Age: 50
Discharge: HOME | End: 2025-06-09
Attending: EMERGENCY MEDICINE
Payer: COMMERCIAL

## 2025-06-09 ENCOUNTER — APPOINTMENT (OUTPATIENT)
Dept: VASCULAR LAB | Age: 50
End: 2025-06-09
Payer: COMMERCIAL

## 2025-06-09 VITALS
HEIGHT: 62 IN | HEART RATE: 77 BPM | WEIGHT: 154 LBS | TEMPERATURE: 98.6 F | RESPIRATION RATE: 14 BRPM | OXYGEN SATURATION: 100 % | DIASTOLIC BLOOD PRESSURE: 93 MMHG | SYSTOLIC BLOOD PRESSURE: 135 MMHG | BODY MASS INDEX: 28.34 KG/M2

## 2025-06-09 DIAGNOSIS — S93.401A SPRAIN OF RIGHT ANKLE, UNSPECIFIED LIGAMENT, INITIAL ENCOUNTER: Primary | ICD-10-CM

## 2025-06-09 PROCEDURE — 73630 X-RAY EXAM OF FOOT: CPT

## 2025-06-09 PROCEDURE — 93971 EXTREMITY STUDY: CPT

## 2025-06-09 PROCEDURE — 73610 X-RAY EXAM OF ANKLE: CPT

## 2025-06-09 PROCEDURE — 99284 EMERGENCY DEPT VISIT MOD MDM: CPT

## 2025-06-09 PROCEDURE — 96372 THER/PROPH/DIAG INJ SC/IM: CPT

## 2025-06-09 PROCEDURE — 6360000002 HC RX W HCPCS

## 2025-06-09 PROCEDURE — 6370000000 HC RX 637 (ALT 250 FOR IP)

## 2025-06-09 RX ORDER — KETOROLAC TROMETHAMINE 30 MG/ML
30 INJECTION, SOLUTION INTRAMUSCULAR; INTRAVENOUS ONCE
Status: COMPLETED | OUTPATIENT
Start: 2025-06-09 | End: 2025-06-09

## 2025-06-09 RX ORDER — FAMOTIDINE 20 MG/1
20 TABLET, FILM COATED ORAL ONCE
Status: COMPLETED | OUTPATIENT
Start: 2025-06-09 | End: 2025-06-09

## 2025-06-09 RX ORDER — TRAMADOL HYDROCHLORIDE 50 MG/1
50 TABLET ORAL EVERY 6 HOURS PRN
Qty: 12 TABLET | Refills: 0 | Status: SHIPPED | OUTPATIENT
Start: 2025-06-09 | End: 2025-06-12

## 2025-06-09 RX ADMIN — FAMOTIDINE 20 MG: 20 TABLET, FILM COATED ORAL at 12:37

## 2025-06-09 RX ADMIN — KETOROLAC TROMETHAMINE 30 MG: 30 INJECTION, SOLUTION INTRAMUSCULAR at 12:37

## 2025-06-09 ASSESSMENT — LIFESTYLE VARIABLES
HOW MANY STANDARD DRINKS CONTAINING ALCOHOL DO YOU HAVE ON A TYPICAL DAY: PATIENT DOES NOT DRINK
HOW OFTEN DO YOU HAVE A DRINK CONTAINING ALCOHOL: NEVER

## 2025-06-09 ASSESSMENT — ENCOUNTER SYMPTOMS
ABDOMINAL PAIN: 0
VOMITING: 0
SHORTNESS OF BREATH: 0
COLOR CHANGE: 1
NAUSEA: 1

## 2025-06-09 ASSESSMENT — PAIN DESCRIPTION - PAIN TYPE: TYPE: ACUTE PAIN

## 2025-06-09 ASSESSMENT — PAIN - FUNCTIONAL ASSESSMENT: PAIN_FUNCTIONAL_ASSESSMENT: 0-10

## 2025-06-09 ASSESSMENT — PAIN DESCRIPTION - ORIENTATION: ORIENTATION: RIGHT

## 2025-06-09 ASSESSMENT — PAIN SCALES - GENERAL: PAINLEVEL_OUTOF10: 8

## 2025-06-09 ASSESSMENT — PAIN DESCRIPTION - LOCATION: LOCATION: ANKLE

## 2025-06-09 NOTE — ED PROVIDER NOTES
injector  inject 1 milliliter ( 120 milligrams ) subcutaneously and INTO TH...  (REFER TO PRESCRIPTION    IBUPROFEN (ADVIL;MOTRIN) 600 MG TABLET    Take 1 tablet by mouth every 6 hours as needed for Pain    PROMETHAZINE (PHENERGAN) 12.5 MG TABLET    Take 25 mg by mouth     RIZATRIPTAN (MAXALT) 10 MG TABLET        SERTRALINE (ZOLOFT) 25 MG TABLET    Take 25 mg by mouth daily    TIZANIDINE (ZANAFLEX) 4 MG TABLET    Take 2 mg by mouth every 6 hours as needed (2 mg in AM, 2mg at noon and 4mg at bedtime)     TOPIRAMATE (TOPAMAX) 25 MG TABLET    Take 200 mg by mouth 2 times daily        ALLERGIES     is allergic to latex, azithromycin, lamictal [lamotrigine], pcn [penicillins], compazine [prochlorperazine maleate], lexapro [escitalopram oxalate], buspar [buspirone], and zofran [ondansetron hcl].      (Please note that portions of this note were completed with a voice recognition program.  Efforts were made to edit the dictations but occasionally words are mis-transcribed.)    Hilario Walker DO, DO  Attending Emergency Physician       Hilario Walker DO  06/09/25 [id number]    
VIEWS)  Result Date: 6/9/2025  EXAMINATION: THREE XRAY VIEWS OF THE RIGHT ANKLE; THREE XRAY VIEWS OF THE RIGHT FOOT 6/9/2025 1:10 pm COMPARISON: None. HISTORY: ORDERING SYSTEM PROVIDED HISTORY: injured one week ago. paion and swelling over lat mal, 5th MT TECHNOLOGIST PROVIDED HISTORY: injured one week ago. paion and swelling over lat mal, 5th MT Reason for Exam: Patient states rolled right foot under right ankle 10 days ago, pain 49-year-old female with right ankle and right foot pain FINDINGS: Right ankle: Ankle mortise is intact.  Osseous alignment is normal.  Joint spaces are well maintained.  No acute fracture or gross dislocation is seen.  Remote well corticated avulsion fracture fragment of the lateral malleolus.  Minimal plantar calcaneal spur. No significant soft tissue swelling is seen.  No tibiotalar joint effusion is identified.  Boehler's angle is maintained. Right foot: Osseous alignment is normal.  Joint spaces are well maintained.  No marginal erosions are identified.  No acute fracture or gross dislocation is seen. Minimal plantar calcaneal spur.  Remote well corticated avulsion fracture fragment of the lateral malleolus. The medial and middle cuneiforms demonstrate proper alignment with the base of the 1st and 2nd metatarsals respectively. No tibiotalar joint effusion is seen. Boehler's angle is maintained.     RIGHT ANKLE: 1. No acute fracture or dislocation. 2. Remote well corticated avulsion fracture fragment of the lateral malleolus. 3. Minimal plantar calcaneal spur. RIGHT FOOT: 1. No acute fracture or dislocation. 2. Minimal plantar calcaneal spur.       LABS:  Results for orders placed or performed during the hospital encounter of 06/09/25   Vascular duplex lower extremity venous right   Result Value Ref Range    Body Surface Area 1.75 m2       EMERGENCY DEPARTMENT COURSE     ED Course as of 06/10/25 1541   Mon Jun 09, 2025   1312 Vascular duplex lower extremity venous right  No DVT   [AJ]

## 2025-06-09 NOTE — DISCHARGE INSTRUCTIONS
Call today or tomorrow to follow up with your orthopedic surgeon today or tomorrow.  Schedule a follow-up appointment for soon as possible.    Use an ice pack or bag filled with ice and apply to the injured area 3 - 4 times a day for 15 - 20 minutes each time.    Use ibuprofen or Tylenol (unless prescribed medications that have Tylenol in it) for pain.  You can take over the counter Ibuprofen (advil) tablets (4 every 8 hours or 3 every 6 hours or 2 every 4 hours).    Tramadol for breakthrough pain only. WARNING:  May cause drowsiness.  May impair ability to operate vehicles or machinery.  Do not use in combination with alcohol.       Use your boot for the next several days until you are able to take 10 steps without pain.    Return to the Emergency Department for worsening of pain, increase swelling to the ankle, inability to move your toes, any other care or concern.

## 2025-06-10 LAB — ECHO BSA: 1.75 M2

## 2025-06-10 PROCEDURE — 93971 EXTREMITY STUDY: CPT | Performed by: STUDENT IN AN ORGANIZED HEALTH CARE EDUCATION/TRAINING PROGRAM

## 2025-07-25 ENCOUNTER — APPOINTMENT (OUTPATIENT)
Dept: GENERAL RADIOLOGY | Age: 50
End: 2025-07-25
Payer: COMMERCIAL

## 2025-07-25 ENCOUNTER — HOSPITAL ENCOUNTER (EMERGENCY)
Age: 50
Discharge: HOME | End: 2025-07-26
Attending: EMERGENCY MEDICINE
Payer: COMMERCIAL

## 2025-07-25 ENCOUNTER — APPOINTMENT (OUTPATIENT)
Dept: CT IMAGING | Age: 50
End: 2025-07-25
Payer: COMMERCIAL

## 2025-07-25 VITALS
HEIGHT: 64 IN | WEIGHT: 160.94 LBS | BODY MASS INDEX: 27.48 KG/M2 | DIASTOLIC BLOOD PRESSURE: 86 MMHG | OXYGEN SATURATION: 98 % | SYSTOLIC BLOOD PRESSURE: 124 MMHG | HEART RATE: 70 BPM | RESPIRATION RATE: 15 BRPM | TEMPERATURE: 99 F

## 2025-07-25 DIAGNOSIS — M54.2 CERVICAL PAIN: Primary | ICD-10-CM

## 2025-07-25 LAB
ALBUMIN SERPL-MCNC: 4.5 G/DL (ref 3.5–5.2)
ALBUMIN/GLOB SERPL: 1.7 {RATIO} (ref 1–2.5)
ALP SERPL-CCNC: 69 U/L (ref 35–104)
ALT SERPL-CCNC: 26 U/L (ref 10–35)
ANION GAP SERPL CALCULATED.3IONS-SCNC: 12 MMOL/L (ref 9–16)
AST SERPL-CCNC: 23 U/L (ref 10–35)
BASOPHILS # BLD: 0.1 K/UL (ref 0–0.2)
BASOPHILS NFR BLD: 2 % (ref 0–2)
BILIRUB SERPL-MCNC: 0.4 MG/DL (ref 0–1.2)
BUN SERPL-MCNC: 11 MG/DL (ref 6–20)
CALCIUM SERPL-MCNC: 9 MG/DL (ref 8.6–10.4)
CHLORIDE SERPL-SCNC: 110 MMOL/L (ref 98–107)
CO2 SERPL-SCNC: 18 MMOL/L (ref 20–31)
CREAT SERPL-MCNC: 0.7 MG/DL (ref 0.6–0.9)
EOSINOPHIL # BLD: 0.1 K/UL (ref 0–0.4)
EOSINOPHILS RELATIVE PERCENT: 2 % (ref 1–4)
ERYTHROCYTE [DISTWIDTH] IN BLOOD BY AUTOMATED COUNT: 13.5 % (ref 12.5–15.4)
GFR, ESTIMATED: >90 ML/MIN/1.73M2
GLUCOSE SERPL-MCNC: 91 MG/DL (ref 74–99)
HCT VFR BLD AUTO: 40.2 % (ref 36–46)
HGB BLD-MCNC: 13.2 G/DL (ref 12–16)
LYMPHOCYTES NFR BLD: 2.1 K/UL (ref 1–4.8)
LYMPHOCYTES RELATIVE PERCENT: 33 % (ref 24–44)
MCH RBC QN AUTO: 30.3 PG (ref 26–34)
MCHC RBC AUTO-ENTMCNC: 32.8 G/DL (ref 31–37)
MCV RBC AUTO: 92.3 FL (ref 80–100)
MONOCYTES NFR BLD: 0.4 K/UL (ref 0.1–1.2)
MONOCYTES NFR BLD: 7 % (ref 2–11)
NEUTROPHILS NFR BLD: 56 % (ref 36–66)
NEUTS SEG NFR BLD: 3.6 K/UL (ref 1.8–7.7)
PLATELET # BLD AUTO: 225 K/UL (ref 140–450)
PMV BLD AUTO: 7.7 FL (ref 6–12)
POTASSIUM SERPL-SCNC: 3.6 MMOL/L (ref 3.7–5.3)
PROT SERPL-MCNC: 7.2 G/DL (ref 6.6–8.7)
RBC # BLD AUTO: 4.36 M/UL (ref 4–5.2)
SODIUM SERPL-SCNC: 140 MMOL/L (ref 136–145)
TROPONIN I SERPL HS-MCNC: 10 NG/L (ref 0–14)
TROPONIN I SERPL HS-MCNC: 14 NG/L (ref 0–14)
WBC OTHER # BLD: 6.4 K/UL (ref 3.5–11)

## 2025-07-25 PROCEDURE — 99285 EMERGENCY DEPT VISIT HI MDM: CPT | Performed by: EMERGENCY MEDICINE

## 2025-07-25 PROCEDURE — 96374 THER/PROPH/DIAG INJ IV PUSH: CPT | Performed by: EMERGENCY MEDICINE

## 2025-07-25 PROCEDURE — 6360000004 HC RX CONTRAST MEDICATION

## 2025-07-25 PROCEDURE — 85025 COMPLETE CBC W/AUTO DIFF WBC: CPT

## 2025-07-25 PROCEDURE — 6370000000 HC RX 637 (ALT 250 FOR IP)

## 2025-07-25 PROCEDURE — 84484 ASSAY OF TROPONIN QUANT: CPT

## 2025-07-25 PROCEDURE — 72125 CT NECK SPINE W/O DYE: CPT

## 2025-07-25 PROCEDURE — 80053 COMPREHEN METABOLIC PANEL: CPT

## 2025-07-25 PROCEDURE — 2500000003 HC RX 250 WO HCPCS

## 2025-07-25 PROCEDURE — 96375 TX/PRO/DX INJ NEW DRUG ADDON: CPT | Performed by: EMERGENCY MEDICINE

## 2025-07-25 PROCEDURE — 6360000002 HC RX W HCPCS

## 2025-07-25 PROCEDURE — 71045 X-RAY EXAM CHEST 1 VIEW: CPT

## 2025-07-25 PROCEDURE — 71275 CT ANGIOGRAPHY CHEST: CPT

## 2025-07-25 PROCEDURE — 93005 ELECTROCARDIOGRAM TRACING: CPT | Performed by: EMERGENCY MEDICINE

## 2025-07-25 RX ORDER — KETOROLAC TROMETHAMINE 15 MG/ML
15 INJECTION, SOLUTION INTRAMUSCULAR; INTRAVENOUS ONCE
Status: COMPLETED | OUTPATIENT
Start: 2025-07-25 | End: 2025-07-25

## 2025-07-25 RX ORDER — IOPAMIDOL 755 MG/ML
100 INJECTION, SOLUTION INTRAVASCULAR
Status: COMPLETED | OUTPATIENT
Start: 2025-07-25 | End: 2025-07-25

## 2025-07-25 RX ORDER — SODIUM CHLORIDE 0.9 % (FLUSH) 0.9 %
10 SYRINGE (ML) INJECTION ONCE
Status: COMPLETED | OUTPATIENT
Start: 2025-07-25 | End: 2025-07-25

## 2025-07-25 RX ORDER — DEXAMETHASONE SODIUM PHOSPHATE 10 MG/ML
10 INJECTION, SOLUTION INTRAMUSCULAR; INTRAVENOUS ONCE
Status: COMPLETED | OUTPATIENT
Start: 2025-07-25 | End: 2025-07-25

## 2025-07-25 RX ORDER — 0.9 % SODIUM CHLORIDE 0.9 %
80 INTRAVENOUS SOLUTION INTRAVENOUS ONCE
Status: DISCONTINUED | OUTPATIENT
Start: 2025-07-25 | End: 2025-07-26 | Stop reason: HOSPADM

## 2025-07-25 RX ORDER — LIDOCAINE 4 G/G
1 PATCH TOPICAL ONCE
Status: DISCONTINUED | OUTPATIENT
Start: 2025-07-25 | End: 2025-07-26 | Stop reason: HOSPADM

## 2025-07-25 RX ORDER — DEXAMETHASONE SODIUM PHOSPHATE 10 MG/ML
10 INJECTION, SOLUTION INTRAMUSCULAR; INTRAVENOUS ONCE
Status: DISCONTINUED | OUTPATIENT
Start: 2025-07-25 | End: 2025-07-25

## 2025-07-25 RX ORDER — KETOROLAC TROMETHAMINE 30 MG/ML
30 INJECTION, SOLUTION INTRAMUSCULAR; INTRAVENOUS ONCE
Status: DISCONTINUED | OUTPATIENT
Start: 2025-07-25 | End: 2025-07-25

## 2025-07-25 RX ADMIN — IOPAMIDOL 100 ML: 755 INJECTION, SOLUTION INTRAVENOUS at 21:06

## 2025-07-25 RX ADMIN — KETOROLAC TROMETHAMINE 15 MG: 15 INJECTION, SOLUTION INTRAMUSCULAR; INTRAVENOUS at 20:35

## 2025-07-25 RX ADMIN — DEXAMETHASONE SODIUM PHOSPHATE 10 MG: 10 INJECTION, SOLUTION INTRAMUSCULAR; INTRAVENOUS at 20:35

## 2025-07-25 RX ADMIN — Medication 80 ML: at 21:06

## 2025-07-25 RX ADMIN — SODIUM CHLORIDE, PRESERVATIVE FREE 10 ML: 5 INJECTION INTRAVENOUS at 21:06

## 2025-07-25 ASSESSMENT — ENCOUNTER SYMPTOMS
CHEST TIGHTNESS: 0
COUGH: 0
NAUSEA: 0
DIARRHEA: 0
ABDOMINAL PAIN: 0
VOMITING: 0
BACK PAIN: 0
SORE THROAT: 0
SHORTNESS OF BREATH: 1

## 2025-07-25 ASSESSMENT — PAIN DESCRIPTION - PAIN TYPE: TYPE: ACUTE PAIN

## 2025-07-25 ASSESSMENT — PAIN DESCRIPTION - LOCATION: LOCATION: CHEST

## 2025-07-25 ASSESSMENT — PAIN SCALES - GENERAL: PAINLEVEL_OUTOF10: 7

## 2025-07-25 ASSESSMENT — PAIN - FUNCTIONAL ASSESSMENT: PAIN_FUNCTIONAL_ASSESSMENT: 0-10

## 2025-07-25 ASSESSMENT — PAIN DESCRIPTION - DESCRIPTORS: DESCRIPTORS: ACHING

## 2025-07-25 NOTE — ED PROVIDER NOTES
[facility] Emergency Department  [address]  [facility] [place] [address]  Phone: [phone]  Fax: [phone]        Pt Name: Shari Berumen  MRN: [id number]  Birthdate 1975  Date of evaluation: 7/25/25    CHIEFCOMPLAINT       Chief Complaint   Patient presents with    Chest Pain     Intermittent chest pains with tightness in arms and shortness of breath.  1st episode was 4 days ago.  Reocurring daily and again today.        HISTORY OF PRESENT ILLNESS (Location/Symptom, Timing/Onset, Context/Setting, Quality, Duration, Modifying Factors, Severity)      Shari Berumen is a 49 y.o. female with pertinent PMH of cervical spine injury and C4-C5 surgery who presents to the ED via private auto with complaint of neck pain about 4 days ago, states the pain is extending from the left side of her neck onto the left shoulder and left upper back, also extends to the left upper chest.  Patient states it makes it feel as if he cannot take a deep breath.  When she does take a deep breath this aggravates the pain in her neck.  No reinjury of the neck.  Surgery to cervical spine was in 2012 with Dr. Cox at [facility] who is now retired.  Patient states the episode started intermittent and infrequent lasting about 1 minute at the time.  Pain is now constant for the last day.  Has tried Tylenol over-the-counter without relief.  Denies headache, states she takes migraine prevention medication that is effective, denies any headache, blurred or double vision.  Has had numbness and tingling to the left arm all the way down to her fingertips.  States she does have sensation to her left pinky and thumb, numbness to the 2nd through 4th digit.  No fever or chills.  No cardiac history.  No history of blood clots.  No exogenous hormones.  No recent long distance travel or surgery.  No calf pain or tenderness.  Patient placed in c-collar upon my evaluation to stabilize C-spine pending investigation of her

## 2025-07-25 NOTE — ED PROVIDER NOTES
[facility] Emergency Department  [address]  [facility] [address]  Phone: [phone]  Fax: [phone]      Attending Physician Attestation    I performed a history and physical examination of the patient and discussed management with the mid level provider. I reviewed the mid level provider's note and agree with the documented findings and plan of care. Any areas of disagreement are noted on the chart. I was personally present for the key portions of any procedures. I have documented in the chart those procedures where I was not present during the key portions. I have reviewed the emergency nurses triage note. I agree with the chief complaint, past medical history, past surgical history, allergies, medications, social and family history as documented unless otherwise noted below. Documentation of the HPI, Physical Exam and Medical Decision Making performed by mid level providers is based on my personal performance of the HPI, PE and MDM. For Physician Assistant/ Nurse Practitioner cases/documentation I have personally evaluated this patient and have completed at least one if not all key elements of the E/M (history, physical exam, and MDM). Additional findings are as noted.      CHIEF COMPLAINT       Chief Complaint   Patient presents with    Chest Pain     Intermittent chest pains with tightness in arms and shortness of breath.  1st episode was 4 days ago.  Reocurring daily and again today.          HISTORY OF PRESENT ILLNESS    Shari Berumen is a 49 y.o. female who presents to the ER with chest pain, neck discomfort as well as left arm tingling.      PAST MEDICAL HISTORY    has a past medical history of Anxiety, Bladder disorder, CMT (Charcot-Melissa-Tooth disease), Depression, GERD (gastroesophageal reflux disease), Hypertension, Migraines, Neuropathy, Neuropathy involving both lower extremities, and Psychiatric problem.    SURGICAL HISTORY      has a past surgical history that

## 2025-07-26 LAB
EKG ATRIAL RATE: 67 BPM
EKG P AXIS: 58 DEGREES
EKG P-R INTERVAL: 166 MS
EKG Q-T INTERVAL: 372 MS
EKG QRS DURATION: 86 MS
EKG QTC CALCULATION (BAZETT): 393 MS
EKG R AXIS: 38 DEGREES
EKG T AXIS: 59 DEGREES
EKG VENTRICULAR RATE: 67 BPM

## 2025-07-26 PROCEDURE — 93010 ELECTROCARDIOGRAM REPORT: CPT | Performed by: INTERNAL MEDICINE

## 2025-07-26 NOTE — DISCHARGE INSTRUCTIONS
Return to the emergency department if symptoms worsen or persist.  Follow-up with your family doctor Monday for further evaluation and testing as an outpatient.  Remove the patch applied in the ED in 12 hours and reapply after another 12 hours with an over the counter lidoderm patch as needed.